# Patient Record
Sex: MALE | Race: WHITE | NOT HISPANIC OR LATINO | Employment: OTHER | URBAN - METROPOLITAN AREA
[De-identification: names, ages, dates, MRNs, and addresses within clinical notes are randomized per-mention and may not be internally consistent; named-entity substitution may affect disease eponyms.]

---

## 2024-09-09 ENCOUNTER — APPOINTMENT (OUTPATIENT)
Dept: CARDIOLOGY | Facility: HOSPITAL | Age: 69
End: 2024-09-09
Payer: MEDICARE

## 2024-09-09 ENCOUNTER — APPOINTMENT (OUTPATIENT)
Dept: RADIOLOGY | Facility: HOSPITAL | Age: 69
End: 2024-09-09
Payer: MEDICARE

## 2024-09-09 ENCOUNTER — HOSPITAL ENCOUNTER (INPATIENT)
Facility: HOSPITAL | Age: 69
LOS: 2 days | Discharge: NURSING, REHAB OR LONG-TERM CARE | End: 2024-09-11
Attending: EMERGENCY MEDICINE | Admitting: SURGERY
Payer: MEDICARE

## 2024-09-09 DIAGNOSIS — R41.0 DISORIENTATION: ICD-10-CM

## 2024-09-09 DIAGNOSIS — N17.9 ACUTE RENAL FAILURE, UNSPECIFIED ACUTE RENAL FAILURE TYPE (CMS-HCC): Primary | ICD-10-CM

## 2024-09-09 LAB
ALBUMIN SERPL BCP-MCNC: 3.9 G/DL (ref 3.4–5)
ALP SERPL-CCNC: 89 U/L (ref 33–136)
ALT SERPL W P-5'-P-CCNC: 24 U/L (ref 10–52)
ANION GAP SERPL CALC-SCNC: 22 MMOL/L (ref 10–20)
APPEARANCE UR: ABNORMAL
AST SERPL W P-5'-P-CCNC: 28 U/L (ref 9–39)
BACTERIA #/AREA URNS AUTO: ABNORMAL /HPF
BASOPHILS # BLD AUTO: 0.03 X10*3/UL (ref 0–0.1)
BASOPHILS NFR BLD AUTO: 0.2 %
BILIRUB DIRECT SERPL-MCNC: 0.1 MG/DL (ref 0–0.3)
BILIRUB SERPL-MCNC: 0.6 MG/DL (ref 0–1.2)
BILIRUB UR STRIP.AUTO-MCNC: NEGATIVE MG/DL
BUN SERPL-MCNC: 127 MG/DL (ref 6–23)
CALCIUM SERPL-MCNC: 9.5 MG/DL (ref 8.6–10.3)
CHLORIDE SERPL-SCNC: 95 MMOL/L (ref 98–107)
CO2 SERPL-SCNC: 20 MMOL/L (ref 21–32)
COLOR UR: ABNORMAL
CREAT SERPL-MCNC: 6.11 MG/DL (ref 0.5–1.3)
EGFRCR SERPLBLD CKD-EPI 2021: 9 ML/MIN/1.73M*2
EOSINOPHIL # BLD AUTO: 0.03 X10*3/UL (ref 0–0.7)
EOSINOPHIL NFR BLD AUTO: 0.2 %
ERYTHROCYTE [DISTWIDTH] IN BLOOD BY AUTOMATED COUNT: 13 % (ref 11.5–14.5)
ETHANOL SERPL-MCNC: <10 MG/DL
GLUCOSE SERPL-MCNC: 132 MG/DL (ref 74–99)
GLUCOSE UR STRIP.AUTO-MCNC: NORMAL MG/DL
HCT VFR BLD AUTO: 35.2 % (ref 41–52)
HGB BLD-MCNC: 11.6 G/DL (ref 13.5–17.5)
HYALINE CASTS #/AREA URNS AUTO: ABNORMAL /LPF
IMM GRANULOCYTES # BLD AUTO: 0.06 X10*3/UL (ref 0–0.7)
IMM GRANULOCYTES NFR BLD AUTO: 0.5 % (ref 0–0.9)
INR PPP: 1.3 (ref 0.9–1.1)
KETONES UR STRIP.AUTO-MCNC: NEGATIVE MG/DL
LACTATE SERPL-SCNC: 0.8 MMOL/L (ref 0.4–2)
LEUKOCYTE ESTERASE UR QL STRIP.AUTO: ABNORMAL
LYMPHOCYTES # BLD AUTO: 1.3 X10*3/UL (ref 1.2–4.8)
LYMPHOCYTES NFR BLD AUTO: 10.7 %
MCH RBC QN AUTO: 32.6 PG (ref 26–34)
MCHC RBC AUTO-ENTMCNC: 33 G/DL (ref 32–36)
MCV RBC AUTO: 99 FL (ref 80–100)
MONOCYTES # BLD AUTO: 0.9 X10*3/UL (ref 0.1–1)
MONOCYTES NFR BLD AUTO: 7.4 %
NEUTROPHILS # BLD AUTO: 9.78 X10*3/UL (ref 1.2–7.7)
NEUTROPHILS NFR BLD AUTO: 81 %
NITRITE UR QL STRIP.AUTO: NEGATIVE
NRBC BLD-RTO: 0 /100 WBCS (ref 0–0)
PH UR STRIP.AUTO: 5.5 [PH]
PLATELET # BLD AUTO: 360 X10*3/UL (ref 150–450)
POTASSIUM SERPL-SCNC: 4.9 MMOL/L (ref 3.5–5.3)
PROT SERPL-MCNC: 8.5 G/DL (ref 6.4–8.2)
PROT UR STRIP.AUTO-MCNC: ABNORMAL MG/DL
PROTHROMBIN TIME: 14.5 SECONDS (ref 9.8–12.8)
RBC # BLD AUTO: 3.56 X10*6/UL (ref 4.5–5.9)
RBC # UR STRIP.AUTO: ABNORMAL /UL
RBC #/AREA URNS AUTO: ABNORMAL /HPF
SODIUM SERPL-SCNC: 132 MMOL/L (ref 136–145)
SP GR UR STRIP.AUTO: 1.02
SQUAMOUS #/AREA URNS AUTO: ABNORMAL /HPF
UROBILINOGEN UR STRIP.AUTO-MCNC: NORMAL MG/DL
WBC # BLD AUTO: 12.1 X10*3/UL (ref 4.4–11.3)
WBC #/AREA URNS AUTO: >50 /HPF

## 2024-09-09 PROCEDURE — 71045 X-RAY EXAM CHEST 1 VIEW: CPT

## 2024-09-09 PROCEDURE — 71045 X-RAY EXAM CHEST 1 VIEW: CPT | Mod: FOREIGN READ | Performed by: RADIOLOGY

## 2024-09-09 PROCEDURE — 85025 COMPLETE CBC W/AUTO DIFF WBC: CPT | Performed by: EMERGENCY MEDICINE

## 2024-09-09 PROCEDURE — 87075 CULTR BACTERIA EXCEPT BLOOD: CPT | Mod: ELYLAB | Performed by: EMERGENCY MEDICINE

## 2024-09-09 PROCEDURE — 96361 HYDRATE IV INFUSION ADD-ON: CPT

## 2024-09-09 PROCEDURE — 2500000004 HC RX 250 GENERAL PHARMACY W/ HCPCS (ALT 636 FOR OP/ED): Performed by: NURSE PRACTITIONER

## 2024-09-09 PROCEDURE — 93005 ELECTROCARDIOGRAM TRACING: CPT

## 2024-09-09 PROCEDURE — 36415 COLL VENOUS BLD VENIPUNCTURE: CPT | Performed by: EMERGENCY MEDICINE

## 2024-09-09 PROCEDURE — 2020000001 HC ICU ROOM DAILY

## 2024-09-09 PROCEDURE — 76770 US EXAM ABDO BACK WALL COMP: CPT | Mod: FOREIGN READ | Performed by: RADIOLOGY

## 2024-09-09 PROCEDURE — 83605 ASSAY OF LACTIC ACID: CPT | Performed by: EMERGENCY MEDICINE

## 2024-09-09 PROCEDURE — 99291 CRITICAL CARE FIRST HOUR: CPT | Mod: 25 | Performed by: EMERGENCY MEDICINE

## 2024-09-09 PROCEDURE — 96374 THER/PROPH/DIAG INJ IV PUSH: CPT

## 2024-09-09 PROCEDURE — 82374 ASSAY BLOOD CARBON DIOXIDE: CPT | Performed by: EMERGENCY MEDICINE

## 2024-09-09 PROCEDURE — 85610 PROTHROMBIN TIME: CPT | Performed by: EMERGENCY MEDICINE

## 2024-09-09 PROCEDURE — 2500000004 HC RX 250 GENERAL PHARMACY W/ HCPCS (ALT 636 FOR OP/ED)

## 2024-09-09 PROCEDURE — 99291 CRITICAL CARE FIRST HOUR: CPT | Performed by: NURSE PRACTITIONER

## 2024-09-09 PROCEDURE — 76770 US EXAM ABDO BACK WALL COMP: CPT

## 2024-09-09 PROCEDURE — 82077 ASSAY SPEC XCP UR&BREATH IA: CPT | Performed by: EMERGENCY MEDICINE

## 2024-09-09 PROCEDURE — 82248 BILIRUBIN DIRECT: CPT | Performed by: EMERGENCY MEDICINE

## 2024-09-09 PROCEDURE — 2500000004 HC RX 250 GENERAL PHARMACY W/ HCPCS (ALT 636 FOR OP/ED): Performed by: EMERGENCY MEDICINE

## 2024-09-09 PROCEDURE — 3E033XZ INTRODUCTION OF VASOPRESSOR INTO PERIPHERAL VEIN, PERCUTANEOUS APPROACH: ICD-10-PCS | Performed by: SURGERY

## 2024-09-09 PROCEDURE — 87086 URINE CULTURE/COLONY COUNT: CPT | Mod: ELYLAB | Performed by: EMERGENCY MEDICINE

## 2024-09-09 PROCEDURE — 81001 URINALYSIS AUTO W/SCOPE: CPT | Performed by: EMERGENCY MEDICINE

## 2024-09-09 RX ORDER — SODIUM CHLORIDE, SODIUM LACTATE, POTASSIUM CHLORIDE, CALCIUM CHLORIDE 600; 310; 30; 20 MG/100ML; MG/100ML; MG/100ML; MG/100ML
75 INJECTION, SOLUTION INTRAVENOUS CONTINUOUS
Status: DISCONTINUED | OUTPATIENT
Start: 2024-09-09 | End: 2024-09-10

## 2024-09-09 RX ORDER — OLANZAPINE 10 MG/2ML
5 INJECTION, POWDER, FOR SOLUTION INTRAMUSCULAR ONCE AS NEEDED
Status: COMPLETED | OUTPATIENT
Start: 2024-09-09 | End: 2024-09-09

## 2024-09-09 RX ORDER — DEXMEDETOMIDINE HYDROCHLORIDE 4 UG/ML
INJECTION, SOLUTION INTRAVENOUS
Status: COMPLETED
Start: 2024-09-09 | End: 2024-09-09

## 2024-09-09 RX ORDER — SODIUM CHLORIDE 9 MG/ML
125 INJECTION, SOLUTION INTRAVENOUS CONTINUOUS
Status: DISCONTINUED | OUTPATIENT
Start: 2024-09-09 | End: 2024-09-09

## 2024-09-09 RX ORDER — NOREPINEPHRINE BITARTRATE/D5W 8 MG/250ML
.01-.2 PLASTIC BAG, INJECTION (ML) INTRAVENOUS CONTINUOUS
Status: DISCONTINUED | OUTPATIENT
Start: 2024-09-09 | End: 2024-09-10

## 2024-09-09 RX ORDER — CEFTRIAXONE 1 G/50ML
1 INJECTION, SOLUTION INTRAVENOUS EVERY 24 HOURS
Status: DISCONTINUED | OUTPATIENT
Start: 2024-09-09 | End: 2024-09-11 | Stop reason: HOSPADM

## 2024-09-09 RX ORDER — HEPARIN SODIUM 5000 [USP'U]/ML
5000 INJECTION, SOLUTION INTRAVENOUS; SUBCUTANEOUS EVERY 8 HOURS SCHEDULED
Status: DISCONTINUED | OUTPATIENT
Start: 2024-09-09 | End: 2024-09-11 | Stop reason: HOSPADM

## 2024-09-09 RX ORDER — DEXMEDETOMIDINE HYDROCHLORIDE 4 UG/ML
0-1.5 INJECTION, SOLUTION INTRAVENOUS CONTINUOUS
Status: DISCONTINUED | OUTPATIENT
Start: 2024-09-09 | End: 2024-09-10

## 2024-09-09 SDOH — SOCIAL STABILITY: SOCIAL INSECURITY: HAS ANYONE EVER THREATENED TO HURT YOUR FAMILY OR YOUR PETS?: NO

## 2024-09-09 SDOH — SOCIAL STABILITY: SOCIAL INSECURITY: HAVE YOU HAD ANY THOUGHTS OF HARMING ANYONE ELSE?: NO

## 2024-09-09 SDOH — SOCIAL STABILITY: SOCIAL INSECURITY: HAVE YOU HAD THOUGHTS OF HARMING ANYONE ELSE?: NO

## 2024-09-09 SDOH — SOCIAL STABILITY: SOCIAL INSECURITY: DO YOU FEEL UNSAFE GOING BACK TO THE PLACE WHERE YOU ARE LIVING?: NO

## 2024-09-09 SDOH — SOCIAL STABILITY: SOCIAL INSECURITY: DO YOU FEEL ANYONE HAS EXPLOITED OR TAKEN ADVANTAGE OF YOU FINANCIALLY OR OF YOUR PERSONAL PROPERTY?: NO

## 2024-09-09 SDOH — SOCIAL STABILITY: SOCIAL INSECURITY: ARE THERE ANY APPARENT SIGNS OF INJURIES/BEHAVIORS THAT COULD BE RELATED TO ABUSE/NEGLECT?: NO

## 2024-09-09 SDOH — SOCIAL STABILITY: SOCIAL INSECURITY: ARE YOU OR HAVE YOU BEEN THREATENED OR ABUSED PHYSICALLY, EMOTIONALLY, OR SEXUALLY BY ANYONE?: NO

## 2024-09-09 SDOH — SOCIAL STABILITY: SOCIAL INSECURITY: ABUSE: ADULT

## 2024-09-09 SDOH — SOCIAL STABILITY: SOCIAL INSECURITY: DOES ANYONE TRY TO KEEP YOU FROM HAVING/CONTACTING OTHER FRIENDS OR DOING THINGS OUTSIDE YOUR HOME?: NO

## 2024-09-09 SDOH — SOCIAL STABILITY: SOCIAL INSECURITY: WERE YOU ABLE TO COMPLETE ALL THE BEHAVIORAL HEALTH SCREENINGS?: YES

## 2024-09-09 ASSESSMENT — COGNITIVE AND FUNCTIONAL STATUS - GENERAL
PERSONAL GROOMING: A LITTLE
DRESSING REGULAR UPPER BODY CLOTHING: A LITTLE
STANDING UP FROM CHAIR USING ARMS: A LITTLE
CLIMB 3 TO 5 STEPS WITH RAILING: A LOT
DAILY ACTIVITIY SCORE: 18
HELP NEEDED FOR BATHING: A LOT
WALKING IN HOSPITAL ROOM: A LOT
TOILETING: A LITTLE
PATIENT BASELINE BEDBOUND: NO
MOBILITY SCORE: 19
DRESSING REGULAR LOWER BODY CLOTHING: A LITTLE

## 2024-09-09 ASSESSMENT — PAIN SCALES - GENERAL
PAINLEVEL_OUTOF10: 0 - NO PAIN
PAINLEVEL_OUTOF10: 0 - NO PAIN

## 2024-09-09 ASSESSMENT — ACTIVITIES OF DAILY LIVING (ADL)
PATIENT'S MEMORY ADEQUATE TO SAFELY COMPLETE DAILY ACTIVITIES?: NO
HEARING - RIGHT EAR: FUNCTIONAL
GROOMING: NEEDS ASSISTANCE
DRESSING YOURSELF: NEEDS ASSISTANCE
BATHING: INDEPENDENT
JUDGMENT_ADEQUATE_SAFELY_COMPLETE_DAILY_ACTIVITIES: NO
ADEQUATE_TO_COMPLETE_ADL: YES
WALKS IN HOME: NEEDS ASSISTANCE
LACK_OF_TRANSPORTATION: PATIENT UNABLE TO ANSWER
HEARING - LEFT EAR: FUNCTIONAL
TOILETING: NEEDS ASSISTANCE
FEEDING YOURSELF: INDEPENDENT

## 2024-09-09 ASSESSMENT — LIFESTYLE VARIABLES
PAROXYSMAL SWEATS: NO SWEAT VISIBLE
VISUAL DISTURBANCES: NOT PRESENT
BLOOD PRESSURE: 97/56
AUDIT-C TOTAL SCORE: -1
AGITATION: NORMAL ACTIVITY
HOW OFTEN DO YOU HAVE A DRINK CONTAINING ALCOHOL: NEVER
AUDIT-C TOTAL SCORE: -1
NAUSEA AND VOMITING: NO NAUSEA AND NO VOMITING
AUDITORY DISTURBANCES: NOT PRESENT
TREMOR: 5
TOTAL SCORE: 8
SKIP TO QUESTIONS 9-10: 0
HOW OFTEN DO YOU HAVE 6 OR MORE DRINKS ON ONE OCCASION: NEVER
ORIENTATION AND CLOUDING OF SENSORIUM: DISORIENTED FOR DATE BY MORE THAN 2 CALENDAR DAYS
HOW MANY STANDARD DRINKS CONTAINING ALCOHOL DO YOU HAVE ON A TYPICAL DAY: PATIENT DECLINED
HEADACHE, FULLNESS IN HEAD: NOT PRESENT
ANXIETY: NO ANXIETY, AT EASE
PULSE: 92

## 2024-09-09 ASSESSMENT — COLUMBIA-SUICIDE SEVERITY RATING SCALE - C-SSRS
1. IN THE PAST MONTH, HAVE YOU WISHED YOU WERE DEAD OR WISHED YOU COULD GO TO SLEEP AND NOT WAKE UP?: NO
6. HAVE YOU EVER DONE ANYTHING, STARTED TO DO ANYTHING, OR PREPARED TO DO ANYTHING TO END YOUR LIFE?: NO
2. HAVE YOU ACTUALLY HAD ANY THOUGHTS OF KILLING YOURSELF?: NO

## 2024-09-09 ASSESSMENT — PATIENT HEALTH QUESTIONNAIRE - PHQ9
2. FEELING DOWN, DEPRESSED OR HOPELESS: NOT AT ALL
1. LITTLE INTEREST OR PLEASURE IN DOING THINGS: NOT AT ALL
SUM OF ALL RESPONSES TO PHQ9 QUESTIONS 1 & 2: 0

## 2024-09-09 ASSESSMENT — PAIN - FUNCTIONAL ASSESSMENT
PAIN_FUNCTIONAL_ASSESSMENT: 0-10
PAIN_FUNCTIONAL_ASSESSMENT: 0-10

## 2024-09-09 NOTE — ED PROVIDER NOTES
HPI   Chief Complaint   Patient presents with    Altered Mental Status     Normally A&Ox3. Currently A&Ox2. Oriented to person and situation.    Abnormal Labs     , creat 6.43, K 5.7. Sent for concern of kidney failure.       Patient is a 69-year-old male with history of alcohol abuse, hypertension hyperlipidemia comes from the nursing home because abnormal labs as per EMS were the historian patient is always confused but as per EMS they told him that he is more confused than usual.  Patient denies any pain awake              Patient History   No past medical history on file.  No past surgical history on file.  No family history on file.  Social History     Tobacco Use    Smoking status: Not on file    Smokeless tobacco: Not on file   Substance Use Topics    Alcohol use: Not on file    Drug use: Not on file       Physical Exam   ED Triage Vitals   Temperature Heart Rate Respirations BP   09/09/24 1613 09/09/24 1613 09/09/24 1613 09/09/24 1613   36.4 °C (97.5 °F) 100 18 (!) 70/48      Pulse Ox Temp src Heart Rate Source Patient Position   09/09/24 1618 -- -- --   95 %         BP Location FiO2 (%)     -- --             Physical Exam  Vitals and nursing note reviewed.   Cardiovascular:      Rate and Rhythm: Normal rate and regular rhythm.   Pulmonary:      Effort: Pulmonary effort is normal.      Breath sounds: Normal breath sounds.   Abdominal:      Palpations: Abdomen is soft.   Skin:     General: Skin is warm.   Neurological:      General: No focal deficit present.      Mental Status: He is alert. Mental status is at baseline.           ED Course & MDM   Diagnoses as of 09/09/24 1904   Acute renal failure, unspecified acute renal failure type (CMS-HCC)   Disorientation                 No data recorded     [place] Coma Scale Score: 14 (09/09/24 1803 : Montse [place], [place])                           Medical Decision Making  EKG interpreted by me shows normal sinus rhythm rate of 91 normal QRS and normal ST  segment  My differential diagnosis  Acute electrolyte imbalance acute UTI acute dehydration acute sepsis  I reviewed the labs that were sent to the patient from the nursing home and also records from the [facility].  I ordered a CBC chemistry lactate urinalysis and EKG.  I also ordered IV fluids for the patient and the patient blood pressures were on the lower side.  Patient's elevated white blood cell count but serum lactate was normal after getting 3 L of normal saline patient's blood pressure responded as high as 97 systolic and then soon after went down to 81.  At this time decision was made to start the patient on continuous IV fluids and pressors discussed with the ICU team and at this time patient admitted to the ICU for further monitoring.  I also reached out to them as there was a sepsis screening and they recommended no antibiotics at this time and they would follow-up.  Labs Reviewed  CBC WITH AUTO DIFFERENTIAL - Abnormal     WBC                           12.1 (*)               nRBC                          0.0                    RBC                           3.56 (*)               Hemoglobin                    11.6 (*)               Hematocrit                    35.2 (*)               MCV                           99                     MCH                           32.6                   MCHC                          33.0                   RDW                           13.0                   Platelets                     360                    Neutrophils %                 81.0                   Immature Granulocytes %, Automated   0.5                    Lymphocytes %                 10.7                   Monocytes %                   7.4                    Eosinophils %                 0.2                    Basophils %                   0.2                    Neutrophils Absolute          9.78 (*)               Immature Granulocytes Absolute, Au*   0.06                   Lymphocytes Absolute           1.30                   Monocytes Absolute            0.90                   Eosinophils Absolute          0.03                   Basophils Absolute            0.03                BASIC METABOLIC PANEL - Abnormal     Glucose                       132 (*)                Sodium                        132 (*)                Potassium                     4.9                    Chloride                      95 (*)                 Bicarbonate                   20 (*)                 Anion Gap                     22 (*)                 Urea Nitrogen                 127 (*)                Creatinine                    6.11 (*)               eGFR                          9 (*)                  Calcium                       9.5                 HEPATIC FUNCTION PANEL - Abnormal     Albumin                       3.9                    Bilirubin, Total              0.6                    Bilirubin, Direct             0.1                    Alkaline Phosphatase          89                     ALT                           24                     AST                           28                     Total Protein                 8.5 (*)             PROTIME-INR - Abnormal     Protime                       14.5 (*)               INR                           1.3 (*)             LACTATE - Normal     Lactate                       0.8                      Narrative: Venipuncture immediately after or during the administration of Metamizole may lead to falsely low results. Testing should be performed immediately                prior to Metamizole dosing.  ALCOHOL - Normal     Alcohol                       <10                 BLOOD CULTURE  BLOOD CULTURE  URINALYSIS WITH REFLEX CULTURE AND MICROSCOPIC       Narrative: The following orders were created for panel order Urinalysis with Reflex Culture and Microscopic.                Procedure                               Abnormality         Status                                    ---------                               -----------         ------                                   Urinalysis with Reflex C...[[id number]]                                                               Extra Urine Gray Tube[[id number]]                                                                                     Please view results for these tests on the individual orders.  URINALYSIS WITH REFLEX CULTURE AND MICROSCOPIC  EXTRA URINE GRAY TUBE     XR chest 1 view   Final Result    No acute process.    Signed by Neftaly Lewis MD              Procedure  Critical Care    Performed by: Gwyn Macias MD  Authorized by: Gwyn Macias MD    Critical care provider statement:     Critical care time (minutes):  35    Critical care time was exclusive of:  Separately billable procedures and treating other patients    Critical care was necessary to treat or prevent imminent or life-threatening deterioration of the following conditions:  Circulatory failure    Critical care was time spent personally by me on the following activities:  Development of treatment plan with patient or surrogate, discussions with consultants, evaluation of patient's response to treatment, examination of patient, obtaining history from patient or surrogate, ordering and performing treatments and interventions, ordering and review of laboratory studies, ordering and review of radiographic studies, re-evaluation of patient's condition and review of old charts    Care discussed with: admitting provider         Gwyn Macias MD  09/09/24 8677

## 2024-09-10 LAB
ANION GAP SERPL CALC-SCNC: 13 MMOL/L (ref 10–20)
ANION GAP SERPL CALC-SCNC: 14 MMOL/L (ref 10–20)
ANION GAP SERPL CALC-SCNC: 17 MMOL/L (ref 10–20)
BUN SERPL-MCNC: 105 MG/DL (ref 6–23)
BUN SERPL-MCNC: 80 MG/DL (ref 6–23)
BUN SERPL-MCNC: 93 MG/DL (ref 6–23)
CALCIUM SERPL-MCNC: 8.4 MG/DL (ref 8.6–10.3)
CALCIUM SERPL-MCNC: 8.6 MG/DL (ref 8.6–10.3)
CALCIUM SERPL-MCNC: 8.8 MG/DL (ref 8.6–10.3)
CHLORIDE SERPL-SCNC: 108 MMOL/L (ref 98–107)
CHLORIDE SERPL-SCNC: 108 MMOL/L (ref 98–107)
CHLORIDE SERPL-SCNC: 112 MMOL/L (ref 98–107)
CO2 SERPL-SCNC: 15 MMOL/L (ref 21–32)
CO2 SERPL-SCNC: 21 MMOL/L (ref 21–32)
CO2 SERPL-SCNC: 22 MMOL/L (ref 21–32)
CREAT SERPL-MCNC: 1.64 MG/DL (ref 0.5–1.3)
CREAT SERPL-MCNC: 2.73 MG/DL (ref 0.5–1.3)
CREAT SERPL-MCNC: 3.38 MG/DL (ref 0.5–1.3)
EGFRCR SERPLBLD CKD-EPI 2021: 19 ML/MIN/1.73M*2
EGFRCR SERPLBLD CKD-EPI 2021: 24 ML/MIN/1.73M*2
EGFRCR SERPLBLD CKD-EPI 2021: 45 ML/MIN/1.73M*2
ERYTHROCYTE [DISTWIDTH] IN BLOOD BY AUTOMATED COUNT: 13.1 % (ref 11.5–14.5)
FERRITIN SERPL-MCNC: 872 NG/ML (ref 20–300)
GLUCOSE BLD MANUAL STRIP-MCNC: 107 MG/DL (ref 74–99)
GLUCOSE SERPL-MCNC: 105 MG/DL (ref 74–99)
GLUCOSE SERPL-MCNC: 112 MG/DL (ref 74–99)
GLUCOSE SERPL-MCNC: 112 MG/DL (ref 74–99)
HCT VFR BLD AUTO: 29.5 % (ref 41–52)
HGB BLD-MCNC: 9.4 G/DL (ref 13.5–17.5)
HOLD SPECIMEN: NORMAL
IRON SATN MFR SERPL: 49 % (ref 25–45)
IRON SERPL-MCNC: 84 UG/DL (ref 35–150)
MAGNESIUM SERPL-MCNC: 1.84 MG/DL (ref 1.6–2.4)
MCH RBC QN AUTO: 31.9 PG (ref 26–34)
MCHC RBC AUTO-ENTMCNC: 31.9 G/DL (ref 32–36)
MCV RBC AUTO: 100 FL (ref 80–100)
NRBC BLD-RTO: 0 /100 WBCS (ref 0–0)
PHOSPHATE SERPL-MCNC: 4.3 MG/DL (ref 2.5–4.9)
PLATELET # BLD AUTO: 244 X10*3/UL (ref 150–450)
POTASSIUM SERPL-SCNC: 4.9 MMOL/L (ref 3.5–5.3)
POTASSIUM SERPL-SCNC: 5 MMOL/L (ref 3.5–5.3)
POTASSIUM SERPL-SCNC: 5.2 MMOL/L (ref 3.5–5.3)
RBC # BLD AUTO: 2.95 X10*6/UL (ref 4.5–5.9)
SODIUM SERPL-SCNC: 135 MMOL/L (ref 136–145)
SODIUM SERPL-SCNC: 139 MMOL/L (ref 136–145)
SODIUM SERPL-SCNC: 141 MMOL/L (ref 136–145)
TIBC SERPL-MCNC: 172 UG/DL (ref 240–445)
TSH SERPL-ACNC: 0.93 MIU/L (ref 0.44–3.98)
UIBC SERPL-MCNC: 88 UG/DL (ref 110–370)
WBC # BLD AUTO: 5.4 X10*3/UL (ref 4.4–11.3)

## 2024-09-10 PROCEDURE — 82374 ASSAY BLOOD CARBON DIOXIDE: CPT | Performed by: NURSE PRACTITIONER

## 2024-09-10 PROCEDURE — 82947 ASSAY GLUCOSE BLOOD QUANT: CPT

## 2024-09-10 PROCEDURE — 85027 COMPLETE CBC AUTOMATED: CPT | Performed by: NURSE PRACTITIONER

## 2024-09-10 PROCEDURE — 83540 ASSAY OF IRON: CPT | Performed by: INTERNAL MEDICINE

## 2024-09-10 PROCEDURE — 2500000004 HC RX 250 GENERAL PHARMACY W/ HCPCS (ALT 636 FOR OP/ED): Performed by: NURSE PRACTITIONER

## 2024-09-10 PROCEDURE — 2500000004 HC RX 250 GENERAL PHARMACY W/ HCPCS (ALT 636 FOR OP/ED): Performed by: SURGERY

## 2024-09-10 PROCEDURE — 82728 ASSAY OF FERRITIN: CPT | Performed by: INTERNAL MEDICINE

## 2024-09-10 PROCEDURE — 84443 ASSAY THYROID STIM HORMONE: CPT | Performed by: INTERNAL MEDICINE

## 2024-09-10 PROCEDURE — 84100 ASSAY OF PHOSPHORUS: CPT | Performed by: NURSE PRACTITIONER

## 2024-09-10 PROCEDURE — 99291 CRITICAL CARE FIRST HOUR: CPT | Performed by: SURGERY

## 2024-09-10 PROCEDURE — 80048 BASIC METABOLIC PNL TOTAL CA: CPT

## 2024-09-10 PROCEDURE — 80048 BASIC METABOLIC PNL TOTAL CA: CPT | Performed by: NURSE PRACTITIONER

## 2024-09-10 PROCEDURE — 36415 COLL VENOUS BLD VENIPUNCTURE: CPT

## 2024-09-10 PROCEDURE — 36415 COLL VENOUS BLD VENIPUNCTURE: CPT | Performed by: NURSE PRACTITIONER

## 2024-09-10 PROCEDURE — 83735 ASSAY OF MAGNESIUM: CPT | Performed by: NURSE PRACTITIONER

## 2024-09-10 PROCEDURE — 2020000001 HC ICU ROOM DAILY

## 2024-09-10 RX ORDER — FOLIC ACID 1 MG/1
1 TABLET ORAL DAILY
Status: DISCONTINUED | OUTPATIENT
Start: 2024-09-10 | End: 2024-09-11 | Stop reason: HOSPADM

## 2024-09-10 RX ORDER — PHENOBARBITAL 32.4 MG/1
32.4 TABLET ORAL 3 TIMES DAILY
Status: DISCONTINUED | OUTPATIENT
Start: 2024-09-12 | End: 2024-09-10

## 2024-09-10 RX ORDER — PHENOBARBITAL SODIUM 65 MG/ML
32.5 INJECTION, SOLUTION INTRAMUSCULAR; INTRAVENOUS EVERY 8 HOURS
Status: DISCONTINUED | OUTPATIENT
Start: 2024-09-12 | End: 2024-09-11 | Stop reason: HOSPADM

## 2024-09-10 RX ORDER — MULTIVIT-MIN/IRON FUM/FOLIC AC 7.5 MG-4
1 TABLET ORAL DAILY
Status: DISCONTINUED | OUTPATIENT
Start: 2024-09-10 | End: 2024-09-11 | Stop reason: HOSPADM

## 2024-09-10 RX ORDER — PHENOBARBITAL 32.4 MG/1
64.8 TABLET ORAL 3 TIMES DAILY
Status: DISCONTINUED | OUTPATIENT
Start: 2024-09-10 | End: 2024-09-10

## 2024-09-10 RX ORDER — PHENOBARBITAL SODIUM 65 MG/ML
65 INJECTION, SOLUTION INTRAMUSCULAR; INTRAVENOUS EVERY 8 HOURS
Status: DISCONTINUED | OUTPATIENT
Start: 2024-09-10 | End: 2024-09-11 | Stop reason: HOSPADM

## 2024-09-10 RX ORDER — LANOLIN ALCOHOL/MO/W.PET/CERES
100 CREAM (GRAM) TOPICAL DAILY
Status: DISCONTINUED | OUTPATIENT
Start: 2024-09-11 | End: 2024-09-11 | Stop reason: HOSPADM

## 2024-09-10 RX ORDER — LANOLIN ALCOHOL/MO/W.PET/CERES
100 CREAM (GRAM) TOPICAL DAILY
Status: DISCONTINUED | OUTPATIENT
Start: 2024-09-10 | End: 2024-09-10

## 2024-09-10 RX ORDER — SODIUM CHLORIDE, SODIUM LACTATE, POTASSIUM CHLORIDE, CALCIUM CHLORIDE 600; 310; 30; 20 MG/100ML; MG/100ML; MG/100ML; MG/100ML
75 INJECTION, SOLUTION INTRAVENOUS CONTINUOUS
Status: DISCONTINUED | OUTPATIENT
Start: 2024-09-10 | End: 2024-09-11 | Stop reason: HOSPADM

## 2024-09-10 RX ORDER — MAGNESIUM SULFATE HEPTAHYDRATE 40 MG/ML
4 INJECTION, SOLUTION INTRAVENOUS ONCE
Status: COMPLETED | OUTPATIENT
Start: 2024-09-10 | End: 2024-09-10

## 2024-09-10 ASSESSMENT — LIFESTYLE VARIABLES
TOTAL SCORE: 6
HEADACHE, FULLNESS IN HEAD: NOT PRESENT
ORIENTATION AND CLOUDING OF SENSORIUM: CANNOT DO SERIAL ADDITIONS OR IS UNCERTAIN ABOUT DATE
VISUAL DISTURBANCES: NOT PRESENT
TOTAL SCORE: 5
HEADACHE, FULLNESS IN HEAD: NOT PRESENT
TREMOR: NO TREMOR
VISUAL DISTURBANCES: NOT PRESENT
ORIENTATION AND CLOUDING OF SENSORIUM: DISORIENTED FOR DATE BY MORE THAN 2 CALENDAR DAYS
ANXIETY: MILDLY ANXIOUS
PAROXYSMAL SWEATS: NO SWEAT VISIBLE
AUDITORY DISTURBANCES: NOT PRESENT
TOTAL SCORE: 6
NAUSEA AND VOMITING: NO NAUSEA AND NO VOMITING
ANXIETY: MILDLY ANXIOUS
ORIENTATION AND CLOUDING OF SENSORIUM: DISORIENTED FOR DATE BY MORE THAN 2 CALENDAR DAYS
TREMOR: NO TREMOR
ANXIETY: MILDLY ANXIOUS
TOTAL SCORE: 5
TREMOR: 3
AUDITORY DISTURBANCES: NOT PRESENT
TREMOR: NOT VISIBLE, BUT CAN BE FELT FINGERTIP TO FINGERTIP
PAROXYSMAL SWEATS: NO SWEAT VISIBLE
VISUAL DISTURBANCES: NOT PRESENT
ORIENTATION AND CLOUDING OF SENSORIUM: DISORIENTED FOR DATE BY MORE THAN 2 CALENDAR DAYS
ANXIETY: MILDLY ANXIOUS
AGITATION: NORMAL ACTIVITY
AUDITORY DISTURBANCES: NOT PRESENT
PAROXYSMAL SWEATS: NO SWEAT VISIBLE
TREMOR: NOT VISIBLE, BUT CAN BE FELT FINGERTIP TO FINGERTIP
NAUSEA AND VOMITING: NO NAUSEA AND NO VOMITING
AGITATION: NORMAL ACTIVITY
PAROXYSMAL SWEATS: NO SWEAT VISIBLE
HEADACHE, FULLNESS IN HEAD: NOT PRESENT
VISUAL DISTURBANCES: NOT PRESENT
HEADACHE, FULLNESS IN HEAD: NOT PRESENT
AGITATION: 2
AUDITORY DISTURBANCES: NOT PRESENT
TOTAL SCORE: 4
PAROXYSMAL SWEATS: NO SWEAT VISIBLE
ORIENTATION AND CLOUDING OF SENSORIUM: DISORIENTED FOR PLACE OR PERSON
NAUSEA AND VOMITING: NO NAUSEA AND NO VOMITING
VISUAL DISTURBANCES: NOT PRESENT
AUDITORY DISTURBANCES: NOT PRESENT
AGITATION: NORMAL ACTIVITY
ANXIETY: MILDLY ANXIOUS
NAUSEA AND VOMITING: NO NAUSEA AND NO VOMITING
NAUSEA AND VOMITING: NO NAUSEA AND NO VOMITING
HEADACHE, FULLNESS IN HEAD: NOT PRESENT
AGITATION: NORMAL ACTIVITY

## 2024-09-10 ASSESSMENT — COGNITIVE AND FUNCTIONAL STATUS - GENERAL
DRESSING REGULAR UPPER BODY CLOTHING: A LITTLE
EATING MEALS: A LITTLE
HELP NEEDED FOR BATHING: A LOT
MOBILITY SCORE: 17
DAILY ACTIVITIY SCORE: 17
TOILETING: A LITTLE
PERSONAL GROOMING: A LITTLE
MOVING TO AND FROM BED TO CHAIR: A LITTLE
DRESSING REGULAR LOWER BODY CLOTHING: A LITTLE
STANDING UP FROM CHAIR USING ARMS: A LITTLE
WALKING IN HOSPITAL ROOM: A LOT
CLIMB 3 TO 5 STEPS WITH RAILING: TOTAL

## 2024-09-10 ASSESSMENT — PAIN - FUNCTIONAL ASSESSMENT
PAIN_FUNCTIONAL_ASSESSMENT: 0-10
PAIN_FUNCTIONAL_ASSESSMENT: 0-10
PAIN_FUNCTIONAL_ASSESSMENT: CPOT (CRITICAL CARE PAIN OBSERVATION TOOL)
PAIN_FUNCTIONAL_ASSESSMENT: 0-10
PAIN_FUNCTIONAL_ASSESSMENT: CPOT (CRITICAL CARE PAIN OBSERVATION TOOL)
PAIN_FUNCTIONAL_ASSESSMENT: 0-10

## 2024-09-10 ASSESSMENT — PAIN SCALES - GENERAL
PAINLEVEL_OUTOF10: 0 - NO PAIN

## 2024-09-10 NOTE — PROGRESS NOTES
[facility] Critical Care Medicine       Date:  9/10/2024  Patient:  Kyle Argueta  YOB: 1955  MRN:  [id number]   Admit Date:  9/9/2024  ========================================================================================================    Chief Complaint   Patient presents with    Altered Mental Status     Normally A&Ox3. Currently A&Ox2. Oriented to person and situation.    Abnormal Labs     , creat 6.43, K 5.7. Sent for concern of kidney failure.         History of Present Illness:  Kyle Argueta is a 69 y.o. year old male patient with Past Medical History of avascular necrosis of bilateral hips and is wheelchair bound, EtOH abuse and hypertension presents to [facility] emergency department chief complaint of altered mental status.  According to ER documentation patient is normally alert and oriented x 3.  Currently on admission, patient is awake and alert to self only.  Overall he is a poor historian but denies any pain, dysuria, hematuria or suprapubic discomfort.  Patient denies having any fevers or chills.  Patient resides in a nursing facility due to his ambulatory dysfunction.  He was sent in for evaluation for his change in mental status.     On arrival to the emergency department afebrile 36.4,  sinus tach, RR 18, BP 70/48, 95% on 2 L nasal cannula.  Twelve-lead EKG shows normal sinus rhythm with ventricular 91.  No acute ischemic changes.  Normal QTc.  Sodium 132 and potassium 4.9, chloride 95, bicarb 20, anion gap 22, , creatinine 6.11 with a GFR 9.  LFTs within normal limits.  Lactate 0.8.  INR 1.3.  WBC 12.4, neutrophils 9.78, hemoglobin 9.6, hematocrit 35.2, platelet count 360.  Urinalysis shows moderate leukocyte esterase with 2+ bacteria.  No nitrates.  Single view of the chest no acute cardiopulmonary process.  Due to patient's hypotension he was given 2 L of crystalloids with improvement in his blood pressure but did require low-dose norepinephrine and  "will be admitted to the ICU for further care.     Interval ICU Events:  9/9: Admitted to ICU for acute renal failure secondary to dehydration/UTI on norepinephrine.    9/10: significantly improved Creatinine overnight 6->3->2, good  ml, still confused    Medical History:  History reviewed. No pertinent past medical history.  History reviewed. No pertinent surgical history.  No medications prior to admission.     Patient has no known allergies.  Social History     Tobacco Use    Smoking status: Unknown     Passive exposure: Past     No family history on file.    Review of Systems:  14 point review of systems was completed and negative except for those specially mention in my HPI    Physical Exam:    Heart Rate:  []   Temp:  [36.4 °C (97.5 °F)-37 °C (98.6 °F)]   Resp:  [13-34]   BP: ()/(40-75)   Height:  [167.6 cm (5' 6\")]   Weight:  [74.8 kg (165 lb)-78.1 kg (172 lb 2.9 oz)]   SpO2:  [90 %-100 %]     Physical Exam  GeN; Confused and agitated  Heart: RRR  Lungs; CTAB  Abd; Soft, ND, NTTP  Objective:    I have reviewed all medications, laboratory results, and imaging pertinent for today's encounter    Component      Latest Ref Rng 9/9/2024 9/10/2024   WBC      4.4 - 11.3 x10*3/uL 12.1 (H)  5.4    nRBC      0.0 - 0.0 /100 WBCs 0.0  0.0    RBC      4.50 - 5.90 x10*6/uL 3.56 (L)  2.95 (L)    HEMOGLOBIN      13.5 - 17.5 g/dL 11.6 (L)  9.4 (L)    HEMATOCRIT      41.0 - 52.0 % 35.2 (L)  29.5 (L)    MCV      80 - 100 fL 99  100    MCH      26.0 - 34.0 pg 32.6  31.9    MCHC      32.0 - 36.0 g/dL 33.0  31.9 (L)    RED CELL DISTRIBUTION WIDTH      11.5 - 14.5 % 13.0  13.1    Platelets      150 - 450 x10*3/uL 360  244    Neutrophils %      40.0 - 80.0 % 81.0     Immature Granulocytes %, Automated      0.0 - 0.9 % 0.5     Lymphocytes %      13.0 - 44.0 % 10.7     Monocytes %      2.0 - 10.0 % 7.4     Eosinophils %      0.0 - 6.0 % 0.2     Basophils %      0.0 - 2.0 % 0.2     Neutrophils Absolute      1.20 - 7.70 " x10*3/uL 9.78 (H)     Immature Granulocytes Absolute, Automated      0.00 - 0.70 x10*3/uL 0.06     Lymphocytes Absolute      1.20 - 4.80 x10*3/uL 1.30     Monocytes Absolute      0.10 - 1.00 x10*3/uL 0.90     Eosinophils Absolute      0.00 - 0.70 x10*3/uL 0.03     Basophils Absolute      0.00 - 0.10 x10*3/uL 0.03     Color, Urine      Light-Yellow, Yellow, Dark-Yellow  Light-Yellow     Appearance, Urine      Clear  Turbid ! (N)     Specific Gravity, Urine      1.005 - 1.035  1.017     pH, Urine      5.0, 5.5, 6.0, 6.5, 7.0, 7.5, 8.0  5.5     Protein, Urine      NEGATIVE, 10 (TRACE), 20 (TRACE) mg/dL 30 (1+) !     Glucose, Urine      Normal mg/dL Normal     Blood, Urine      NEGATIVE  0.2 (2+) !     Ketones, Urine      NEGATIVE mg/dL NEGATIVE     Bilirubin, Urine      NEGATIVE  NEGATIVE     Urobilinogen, Urine      Normal mg/dL Normal     Nitrite, Urine      NEGATIVE  NEGATIVE     Leukocyte Esterase, Urine      NEGATIVE  500 Jose Enrique/µL !     GLUCOSE      74 - 99 mg/dL 132 (H)  105 (H)    GLUCOSE        112 (H)    SODIUM      136 - 145 mmol/L 132 (L)  139    SODIUM        135 (L)    POTASSIUM      3.5 - 5.3 mmol/L 4.9  5.0    POTASSIUM        5.2    CHLORIDE      98 - 107 mmol/L 95 (L)  108 (H)    CHLORIDE        108 (H)    Bicarbonate      21 - 32 mmol/L 20 (L)  22    Bicarbonate        15 (L)    Anion Gap      10 - 20 mmol/L 22 (H)  14    Anion Gap        17    Blood Urea Nitrogen      6 - 23 mg/dL 127 (HH)  93 (HH)    Blood Urea Nitrogen        105 (HH)    Creatinine      0.50 - 1.30 mg/dL 6.11 (H)  2.73 (H)    Creatinine        3.38 (H)    EGFR      >60 mL/min/1.73m*2 9 (L)  24 (L)    EGFR        19 (L)    Calcium      8.6 - 10.3 mg/dL 9.5  8.6    Calcium        8.4 (L)    Albumin      3.4 - 5.0 g/dL 3.9     Bilirubin Total      0.0 - 1.2 mg/dL 0.6     Bilirubin, Direct      0.0 - 0.3 mg/dL 0.1     Alkaline Phosphatase      33 - 136 U/L 89     ALT      10 - 52 U/L 24     AST      9 - 39 U/L 28     Total Protein       6.4 - 8.2 g/dL 8.5 (H)     WBC, Urine      1-5, NONE /HPF >50 !     RBC, Urine      NONE, 1-2, 3-5 /HPF 3-5     Squamous Epithelial Cells, Urine      Reference range not established. /HPF 1-9 (SPARSE)     Bacteria, Urine      NONE SEEN /HPF 2+ !     Hyaline Casts, Urine      NONE /LPF 1+ !     Protime      9.8 - 12.8 seconds 14.5 (H)     INR      0.9 - 1.1  1.3 (H)     Lactate      0.4 - 2.0 mmol/L 0.8     Alcohol      <=10 mg/dL <10     MAGNESIUM      1.60 - 2.40 mg/dL  1.84    PHOSPHORUS      2.5 - 4.9 mg/dL  4.3       Legend:  (H) High  (L) Low  ! (N) Normal  ! Abnormal  (HH) High Panic    Assessment/Plan:    I am currently managing this critically ill patient for the following problems:    Neuro/Psych/Pain Ctrl/Sedation:  Metabolic Encephalopathy  Hx ETOH abuse, possibly in EtOH withdrawal  -Will place on phenobarb taper  -Needing precedex for sedation     Respiratory/ENT:  -On 2 Liters Nasal Cannula   -Wean O2 as tolerated      Cardiovascular:  Hypotension - resolved  -Off levo  -Will give IVF bolus as needed for hypotension, likely due to hypovolemia/septic shock    GI:  -No acute issues  -Diet as tolerated, but too confused right now to get diet     Renal/Volume Status (Intra & Extravascular):  Acute Renal Failure - likely 2/2 sepsis/hypovolemia - 6->3->2  -Baseline creatinine normal  -IVF maintenance/Bun & Cr monitor  -Avoid nephrotoxic agents  -PVR q4 hours to rule out urinary retention   -Hold lisinopril/hydrochlorothiazide  -Check renal ultrasound  -Consult nephrology  -Will use IVF Bolus for hypotension    Endocrine  No issues to date     Infectious Disease:  UTI  -Follow urine/blood cultures  -1 g Ceftriaxone daily for 7 days, follow up culture results     Heme/Onc:  DVT prophylaxis  Heparin subcutaneous      OBGYN/MSK:  AVN bilateral hips   PT/OT when appropriate   Wheelchair-bound at baseline     Ethics/Code Status:  Full Code      :  DVT Prophylaxis: subcutaneous heparin   GI  Prophylaxis: NA  Bowel Regimen: NA   Diet: Regular Diet if tolerating  [facility]: NA  [place]: TAIWO  [facility]: NA   Restraints: Yes  Dispo: ICU    Critical Care Time:  46 minutes    Carlos Gomez MD  09/10/24  1:03 PM

## 2024-09-10 NOTE — CONSULTS
[facility] Nephrology  Consult Note           Reason for Consult: Acute kidney injury  Requesting Physician:  Dr. Carlos Gomez MD      Chief Complaint: Altered mental status  History Obtained From:  patient, electronic medical record    History of Present Ilness:    69 y.o. year old male who presented to the emergency department for further evaluation and management of change in mental status at the emergency department the patient was awake and alert to self only did not provide any history he was found by the nursing home staff where he resides that he has difficulty in ambulation and his mental sharpness and capacity they sent to the emergency department for evaluation where he was found to have a blood pressure of 70/48 with metabolic acidosis bicarb of 20 and a gap of 22 with BUN and creatinine 127 and 6.1 no known chronic kidney disease hemoglobin is 9.6 and adequate platelet at 360 patient did receive fluids and admitted to critical care bed this clinical presentation did take place in a patient with avascular necrosis of bilateral hips EtOH abuse and essential hypertension  Past Medical History:    History reviewed. No pertinent past medical history.     Past Surgical History:    History reviewed. No pertinent surgical history.     Home Medications:    No current facility-administered medications on file prior to encounter.     No current outpatient medications on file prior to encounter.       Allergies:  Patient has no known allergies.    Social History:    Social History     Socioeconomic History    Marital status: Single     Spouse name: Not on file    Number of children: Not on file    Years of education: Not on file    Highest education level: Not on file   Occupational History    Not on file   Tobacco Use    Smoking status: Unknown     Passive exposure: Past    Smokeless tobacco: Not on file   Vaping Use    Vaping status: Not on file   Substance and Sexual Activity    Alcohol use: Not on file     "Drug use: Not on file    Sexual activity: Not on file   Other Topics Concern    Not on file   Social History Narrative    Not on file     Social Determinants of Health     Financial Resource Strain: Patient Unable To Answer (9/9/2024)    Overall Financial Resource Strain (CARDIA)     Difficulty of Paying Living Expenses: Patient unable to answer   Food Insecurity: No Food Insecurity (8/9/2024)    Received from [facility]    Hunger Vital Sign     Worried About Running Out of Food in the Last Year: Never true     Ran Out of Food in the Last Year: Never true   Transportation Needs: Patient Unable To Answer (9/9/2024)    PRAPARE - Transportation     Lack of Transportation (Medical): Patient unable to answer     Lack of Transportation (Non-Medical): Patient unable to answer   Physical Activity: Not on file   Stress: Not on file   Social Connections: Not on file   Intimate Partner Violence: Not on file   Housing Stability: Patient Unable To Answer (9/9/2024)    Housing Stability Vital Sign     Unable to Pay for Housing in the Last Year: Patient unable to answer     Number of Times Moved in the Last Year: 1     Homeless in the Last Year: Patient unable to answer       Family History:   No family history on file.    Review of Systems:   Patient is alert follow commands sluggishly does not provide any system review      Physical exam:   Constitutional:  VITALS:  /53   Pulse 96   Temp 37 °C (98.6 °F)   Resp 26   Ht 1.676 m (5' 6\")   Wt 78 kg (171 lb 15.3 oz)   SpO2 99%   BMI 27.75 kg/m²      Wt Readings from Last 3 Encounters:   09/10/24 78 kg (171 lb 15.3 oz)      Gen: alert, awake, nad  Head: atraumatic, normocephalic  Skin: no rash, turgor wnl  Heent:  eomi, mmm  Neck: no bruits or jvd noted, thyroid normal  Lungs:  clear to auscultation  Heart:  regular rate and rhythm, no murmurs  Abdomen:  +bs, soft, nt, nd, no hepatosplenomegaly suprapubic dullness  Extremities: no edema  Psychiatric: mood and affect " appropriate; judgement and insight intact  Musculoskeletal:  Rom, muscular strength intact; digits, nails normal    Data/  CBC:   Lab Results   Component Value Date    WBC 5.4 09/10/2024    RBC 2.95 (L) 09/10/2024    HGB 9.4 (L) 09/10/2024    HCT 29.5 (L) 09/10/2024     09/10/2024    MCH 31.9 09/10/2024    MCHC 31.9 (L) 09/10/2024    RDW 13.1 09/10/2024     09/10/2024     BMP:    Lab Results   Component Value Date     09/10/2024    K 5.0 09/10/2024     (H) 09/10/2024    CO2 22 09/10/2024    BUN 93 (HH) 09/10/2024    CREATININE 2.73 (H) 09/10/2024    CALCIUM 8.6 09/10/2024    GLUCOSE 105 (H) 09/10/2024     US renal complete  Narrative: STUDY:  Renal Ultrasound; 9/9/2024 10:22 PM  INDICATION:    Acute renal failure.    COMPARISON:    None available.  ACCESSION NUMBER(S):  [id number]  ORDERING CLINICIAN:  JO GROVES  TECHNIQUE:  Ultrasound of the Kidneys.    FINDINGS:  RIGHT KIDNEY:  The right kidney measures 12.2 cm in length.  Renal cortical  echotexture is increased.  There is no hydronephrosis.  There are no  stones.  There are no cysts.  LEFT KIDNEY:  The left kidney measures 11.8 cm in length.  Renal cortical  echotexture is increased.  There is no hydronephrosis.  There are no  stones.  There are no cysts.     BLADDER:   The urinary bladder is partially distended.  Prostate volume is 10 ml.  Impression: Increased echogenicity of the renal cortices consistent with intrinsic  renal disease.  No hydronephrosis bilaterally.   Signed by Mike Hernandez MD  XR chest 1 view  Narrative: STUDY:  Chest Radiograph;  9/9/2024 5:35PM  INDICATION:  Patient reports weakness.  COMPARISON:  None available.  ACCESSION NUMBER(S):  [id number]  ORDERING CLINICIAN:  MIKA CALDERON  TECHNIQUE:  Frontal chest was obtained at 17:34 hours.  FINDINGS:  CARDIOMEDIASTINAL SILHOUETTE:  Cardiomediastinal silhouette is normal in size and configuration.     LUNGS:  Lungs are clear.     ABDOMEN:  No remarkable  upper abdominal findings.     BONES:  No acute osseous changes.  Impression: No acute process.  Signed by Neftaly Lewis MD  ECG 12 lead  Normal sinus rhythm  Left axis deviation  Abnormal ECG  No previous ECGs available    LFT:   Lab Results   Component Value Date    AST 28 09/09/2024    ALT 24 09/09/2024    ALKPHOS 89 09/09/2024    BILITOT 0.6 09/09/2024      Urinalysis:   Lab Results   Component Value Date    TEAGAN 5.5 09/09/2024    PROTUR 30 (1+) (A) 09/09/2024    GLUCOSEU Normal 09/09/2024    BLOODU 0.2 (2+) (A) 09/09/2024    KETONESU NEGATIVE 09/09/2024    BILIRUBINU NEGATIVE 09/09/2024    NITRITEU NEGATIVE 09/09/2024    LEUKOCYTESU 500 Jose Enrique/µL (A) 09/09/2024      Imaging: US renal complete  Narrative: STUDY:  Renal Ultrasound; 9/9/2024 10:22 PM  INDICATION:    Acute renal failure.    COMPARISON:    None available.  ACCESSION NUMBER(S):  [id number]  ORDERING CLINICIAN:  JO GROVES  TECHNIQUE:  Ultrasound of the Kidneys.    FINDINGS:  RIGHT KIDNEY:  The right kidney measures 12.2 cm in length.  Renal cortical  echotexture is increased.  There is no hydronephrosis.  There are no  stones.  There are no cysts.  LEFT KIDNEY:  The left kidney measures 11.8 cm in length.  Renal cortical  echotexture is increased.  There is no hydronephrosis.  There are no  stones.  There are no cysts.     BLADDER:   The urinary bladder is partially distended.  Prostate volume is 10 ml.  Impression: Increased echogenicity of the renal cortices consistent with intrinsic  renal disease.  No hydronephrosis bilaterally.   Signed by Mike Hernandez MD  XR chest 1 view  Narrative: STUDY:  Chest Radiograph;  9/9/2024 5:35PM  INDICATION:  Patient reports weakness.  COMPARISON:  None available.  ACCESSION NUMBER(S):  [id number]  ORDERING CLINICIAN:  MIKA CALDERON  TECHNIQUE:  Frontal chest was obtained at 17:34 hours.  FINDINGS:  CARDIOMEDIASTINAL SILHOUETTE:  Cardiomediastinal silhouette is normal in size and configuration.      LUNGS:  Lungs are clear.     ABDOMEN:  No remarkable upper abdominal findings.     BONES:  No acute osseous changes.  Impression: No acute process.  Signed by Neftaly Lewis MD  ECG 12 lead  Normal sinus rhythm  Left axis deviation  Abnormal ECG  No previous ECGs available           Assessment/    69 y.o. year old male who presented to the emergency department for further evaluation and management of change in mental status at the emergency department the patient was awake and alert to self only did not provide any history he was found by the nursing home staff where he resides that he has difficulty in ambulation and his mental sharpness and capacity they sent to the emergency department for evaluation where he was found to have a blood pressure of 70/48 with metabolic acidosis bicarb of 20 and a gap of 22 with BUN and creatinine 127 and 6.1 no known chronic kidney disease hemoglobin is 9.6 and adequate platelet at 360 patient did receive fluids and admitted to critical care bed this clinical presentation did take place in a patient with avascular necrosis of bilateral hips EtOH abuse and essential hypertension    Acute kidney injury with associated metabolic acidosis secondary to prerenal azotemia and or obstructive uropathy no NSAID use no ACE or ARB no recent contrast exposure    Plan/  Continue IV fluid  [facility] catheter  Daily clinical and laboratory assessment      Thank you for the consultation.  Please do not hesitate to call with questions.    Deandre Estevez MD

## 2024-09-10 NOTE — NURSING NOTE
Pt bladder scanner shows   45ml at 2130   301ml at 0130  378ml 0230  0500    Patient voided 950ml via external catheter through out my shift.

## 2024-09-10 NOTE — PROGRESS NOTES
09/10/24 1612   Discharge Planning   Home or Post Acute Services Post acute facilities (Rehab/SNF/etc)   Type of Post Acute Facility Services Skilled nursing;Rehab   Expected Discharge Disposition SNF   Does the patient need discharge transport arranged? Yes   RoundTrip coordination needed? Yes   Has discharge transport been arranged? No     Per Interdisciplinary rounding report with team, pt was sent from [facility] due to increased confusion, pt admitted with DX acute renal failure- new onset and UTI. Pt sleeping when tried to meet with him for assessment. [facility] states pt is from SNF unit,  and willing to accept patient back, but If not returning by 9/11 at 11:59pm he will need a new auth for SNF return. CT team will notify attending of this information, and continue to monitor case progression for DC planning.

## 2024-09-10 NOTE — H&P
[facility] Critical Care Medicine       Date:  9/9/2024  Patient:  Kyle Argueta  YOB: 1955  MRN:  [id number]   Admit Date:  9/9/2024  ========================================================================================================    Chief Complaint   Patient presents with    Altered Mental Status     Normally A&Ox3. Currently A&Ox2. Oriented to person and situation.    Abnormal Labs     , creat 6.43, K 5.7. Sent for concern of kidney failure.         History of Present Illness:  Kyle Argueta is a 69 y.o. year old male patient with Past Medical History of avascular necrosis of bilateral hips and is wheelchair bound, EtOH abuse and hypertension presents to [facility] emergency department chief complaint of altered mental status.  According to ER documentation patient is normally alert and oriented x 3.  Currently on admission, patient is awake and alert to self only.  Overall he is a poor historian but denies any pain, dysuria, hematuria or suprapubic discomfort.  Patient denies having any fevers or chills.  Patient resides in a nursing facility due to his ambulatory dysfunction.  He was sent in for evaluation for his change in mental status.    On arrival to the emergency department afebrile 36.4,  sinus tach, RR 18, BP 70/48, 95% on 2 L nasal cannula.  Twelve-lead EKG shows normal sinus rhythm with ventricular 91.  No acute ischemic changes.  Normal QTc.  Sodium 132 and potassium 4.9, chloride 95, bicarb 20, anion gap 22, , creatinine 6.11 with a GFR 9.  LFTs within normal limits.  Lactate 0.8.  INR 1.3.  WBC 12.4, neutrophils 9.78, hemoglobin 9.6, hematocrit 35.2, platelet count 360.  Urinalysis shows moderate leukocyte esterase with 2+ bacteria.  No nitrates.  Single view of the chest no acute cardiopulmonary process.  Due to patient's hypotension he was given 2 L of crystalloids with improvement in his blood pressure but did require low-dose norepinephrine and will  "be admitted to the ICU for further care.    Interval ICU Events:  Admitted to ICU for acute renal failure secondary to dehydration/UTI on norepinephrine.    Medical History:  As noted above    No past surgical history on file.  (Not in a hospital admission)    Patient has no known allergies.     No family history on file.    Review of Systems:  14 point review of systems was completed and negative except for those specially mention in my HPI    Physical Exam:    Heart Rate:  []   Temperature:  [36.4 °C (97.5 °F)-36.7 °C (98.1 °F)]   Respirations:  [18-20]   BP: ()/(48-59)   Height:  [167.6 cm (5' 6\")]   Weight:  [74.8 kg (165 lb)]   Pulse Ox:  [94 %-100 %]     Physical Exam  Constitutional:       General: He is not in acute distress.     Appearance: He is obese. He is ill-appearing.   HENT:      Head: Normocephalic and atraumatic.      Nose: Nose normal.      Mouth/Throat:      Mouth: Mucous membranes are dry.      Pharynx: Oropharynx is clear.   Eyes:      Extraocular Movements: Extraocular movements intact.      Pupils: Pupils are equal, round, and reactive to light.   Cardiovascular:      Rate and Rhythm: Normal rate and regular rhythm.      Pulses: Normal pulses.      Heart sounds: Normal heart sounds.   Pulmonary:      Effort: Pulmonary effort is normal.      Breath sounds: Normal breath sounds.   Abdominal:      General: Bowel sounds are normal.      Palpations: Abdomen is soft.   Musculoskeletal:         General: Normal range of motion.   Skin:     General: Skin is warm.      Capillary Refill: Capillary refill takes less than 2 seconds.   Neurological:      General: No focal deficit present.      Mental Status: He is alert. He is disoriented.   Psychiatric:         Mood and Affect: Mood normal.         Behavior: Behavior normal.     Objective:    Results for orders placed or performed during the hospital encounter of 09/09/24 (from the past 24 hour(s))   CBC and Auto Differential   Result Value " Ref Range    WBC 12.1 (H) 4.4 - 11.3 x10*3/uL    nRBC 0.0 0.0 - 0.0 /100 WBCs    RBC 3.56 (L) 4.50 - 5.90 x10*6/uL    Hemoglobin 11.6 (L) 13.5 - 17.5 g/dL    Hematocrit 35.2 (L) 41.0 - 52.0 %    MCV 99 80 - 100 fL    MCH 32.6 26.0 - 34.0 pg    MCHC 33.0 32.0 - 36.0 g/dL    RDW 13.0 11.5 - 14.5 %    Platelets 360 150 - 450 x10*3/uL    Neutrophils % 81.0 40.0 - 80.0 %    Immature Granulocytes %, Automated 0.5 0.0 - 0.9 %    Lymphocytes % 10.7 13.0 - 44.0 %    Monocytes % 7.4 2.0 - 10.0 %    Eosinophils % 0.2 0.0 - 6.0 %    Basophils % 0.2 0.0 - 2.0 %    Neutrophils Absolute 9.78 (H) 1.20 - 7.70 x10*3/uL    Immature Granulocytes Absolute, Automated 0.06 0.00 - 0.70 x10*3/uL    Lymphocytes Absolute 1.30 1.20 - 4.80 x10*3/uL    Monocytes Absolute 0.90 0.10 - 1.00 x10*3/uL    Eosinophils Absolute 0.03 0.00 - 0.70 x10*3/uL    Basophils Absolute 0.03 0.00 - 0.10 x10*3/uL   Basic metabolic panel   Result Value Ref Range    Glucose 132 (H) 74 - 99 mg/dL    Sodium 132 (L) 136 - 145 mmol/L    Potassium 4.9 3.5 - 5.3 mmol/L    Chloride 95 (L) 98 - 107 mmol/L    Bicarbonate 20 (L) 21 - 32 mmol/L    Anion Gap 22 (H) 10 - 20 mmol/L    Urea Nitrogen 127 (HH) 6 - 23 mg/dL    Creatinine 6.11 (H) 0.50 - 1.30 mg/dL    eGFR 9 (L) >60 mL/min/1.73m*2    Calcium 9.5 8.6 - 10.3 mg/dL   Hepatic function panel   Result Value Ref Range    Albumin 3.9 3.4 - 5.0 g/dL    Bilirubin, Total 0.6 0.0 - 1.2 mg/dL    Bilirubin, Direct 0.1 0.0 - 0.3 mg/dL    Alkaline Phosphatase 89 33 - 136 U/L    ALT 24 10 - 52 U/L    AST 28 9 - 39 U/L    Total Protein 8.5 (H) 6.4 - 8.2 g/dL   Lactate   Result Value Ref Range    Lactate 0.8 0.4 - 2.0 mmol/L   Protime-INR   Result Value Ref Range    Protime 14.5 (H) 9.8 - 12.8 seconds    INR 1.3 (H) 0.9 - 1.1   Ethanol   Result Value Ref Range    Alcohol <10 <=10 mg/dL   ECG 12 lead   Result Value Ref Range    Ventricular Rate 91 BPM    Atrial Rate 91 BPM    ME Interval 140 ms    QRS Duration 90 ms    QT Interval 348 ms     QTC Calculation(Bazett) 428 ms    P Axis 63 degrees    R Axis -40 degrees    T Axis 37 degrees    QRS Count 15 beats    Q Onset 214 ms    P Onset 144 ms    P Offset 190 ms    T Offset 388 ms    QTC Fredericia 400 ms   Urinalysis with Reflex Culture and Microscopic   Result Value Ref Range    Color, Urine Light-Yellow Light-Yellow, Yellow, Dark-Yellow    Appearance, Urine Turbid (N) Clear    Specific Gravity, Urine 1.017 1.005 - 1.035    pH, Urine 5.5 5.0, 5.5, 6.0, 6.5, 7.0, 7.5, 8.0    Protein, Urine 30 (1+) (A) NEGATIVE, 10 (TRACE), 20 (TRACE) mg/dL    Glucose, Urine Normal Normal mg/dL    Blood, Urine 0.2 (2+) (A) NEGATIVE    Ketones, Urine NEGATIVE NEGATIVE mg/dL    Bilirubin, Urine NEGATIVE NEGATIVE    Urobilinogen, Urine Normal Normal mg/dL    Nitrite, Urine NEGATIVE NEGATIVE    Leukocyte Esterase, Urine 500 Jose Enrique/µL (A) NEGATIVE   Microscopic Only, Urine   Result Value Ref Range    WBC, Urine >50 (A) 1-5, NONE /HPF    RBC, Urine 3-5 NONE, 1-2, 3-5 /HPF    Squamous Epithelial Cells, Urine 1-9 (SPARSE) Reference range not established. /HPF    Bacteria, Urine 2+ (A) NONE SEEN /HPF    Hyaline Casts, Urine 1+ (A) NONE /LPF      XR chest 1 view    Result Date: 9/9/2024  STUDY: Chest Radiograph;  9/9/2024 5:35PM INDICATION: Patient reports weakness. COMPARISON: None available. ACCESSION NUMBER(S): [id number] ORDERING CLINICIAN: MIKA CALDERON TECHNIQUE:  Frontal chest was obtained at 17:34 hours. FINDINGS: CARDIOMEDIASTINAL SILHOUETTE: Cardiomediastinal silhouette is normal in size and configuration.  LUNGS: Lungs are clear.  ABDOMEN: No remarkable upper abdominal findings.  BONES: No acute osseous changes.    No acute process. Signed by Neftaly Lewis MD    ECG 12 lead    Result Date: 9/9/2024  Normal sinus rhythm Left axis deviation Abnormal ECG No previous ECGs available    Assessment/Plan:    I am currently managing this critically ill patient for the following problems:    Neuro/Psych/Pain  Ctrl/Sedation:  Metabolic Encephalopathy  Hx ETOH abuse   -Secondary to UTI     Respiratory/ENT:  -On 2 Liters Nasal Cannula   -Wean O2 as tolerated     Cardiovascular:  Hypotension without shock  -Norepinephrine- titrate for Maps >60  -2 Liters of Crystalloids in ED  -Maintenance Fluids overnight     GI:  -No acute issues  -Diet as tolerated    Renal/Volume Status (Intra & Extravascular):  Acute Renal Failure  -Baseline creatinine normal  -Creatinine admission 6.11 GFR 9  -Pre-renal secondary to hypotension and dehydration  -IVF maintenance/Bun & Cr monitor  -Avoid nephrotoxic agents  -PVR q4 hours to rule out urinary retention   -Hold lisinopril/hydrochlorothiazide  -Check renal ultrasound  -Consult nephrology    Endocrine  No issues to date    Infectious Disease:  UTI  -Follow urine/blood cultures  -1 g Ceftriaxone daily     Heme/Onc:  DVT prophylaxis  Heparin subcutaneous     OBGYN/MSK:  AVN bilateral hips   PT/OT when appropriate   Wheelchair-bound at baseline    Ethics/Code Status:  Full Code     :  DVT Prophylaxis: subcutaneous heparin   GI Prophylaxis: NA  Bowel Regimen: NA   Diet: Regular Diet   CVC: NA  [place]: NA  [facility]: NA   Restraints: NA  Dispo: ICU    35 minutes spent in preparing to see patient (I.e. review of medical records), evaluation of diagnostics (I.e. labs, imaging, etc.), documentation, discussing plan of care with patient/ family/ caregiver, and/ or coordination of care with multidisciplinary team. Time does not include completion of procedure time.     Isha Bazan, CNP-APRN  Pulmonary and Critical Care Medicine   [phone]

## 2024-09-11 VITALS
TEMPERATURE: 98.1 F | BODY MASS INDEX: 27.67 KG/M2 | OXYGEN SATURATION: 97 % | HEART RATE: 102 BPM | WEIGHT: 172.18 LBS | DIASTOLIC BLOOD PRESSURE: 55 MMHG | SYSTOLIC BLOOD PRESSURE: 113 MMHG | RESPIRATION RATE: 22 BRPM | HEIGHT: 66 IN

## 2024-09-11 LAB
ALBUMIN SERPL BCP-MCNC: 3 G/DL (ref 3.4–5)
ALP SERPL-CCNC: 70 U/L (ref 33–136)
ALT SERPL W P-5'-P-CCNC: 18 U/L (ref 10–52)
ANION GAP SERPL CALC-SCNC: 16 MMOL/L (ref 10–20)
AST SERPL W P-5'-P-CCNC: 26 U/L (ref 9–39)
BILIRUB SERPL-MCNC: 0.5 MG/DL (ref 0–1.2)
BUN SERPL-MCNC: 47 MG/DL (ref 6–23)
CALCIUM SERPL-MCNC: 8.6 MG/DL (ref 8.6–10.3)
CHLORIDE SERPL-SCNC: 107 MMOL/L (ref 98–107)
CO2 SERPL-SCNC: 20 MMOL/L (ref 21–32)
CREAT SERPL-MCNC: 0.93 MG/DL (ref 0.5–1.3)
EGFRCR SERPLBLD CKD-EPI 2021: 89 ML/MIN/1.73M*2
ERYTHROCYTE [DISTWIDTH] IN BLOOD BY AUTOMATED COUNT: 13.1 % (ref 11.5–14.5)
GLUCOSE SERPL-MCNC: 92 MG/DL (ref 74–99)
HCT VFR BLD AUTO: 26.8 % (ref 41–52)
HGB BLD-MCNC: 8.8 G/DL (ref 13.5–17.5)
HGB RETIC QN: 34 PG (ref 28–38)
IMMATURE RETIC FRACTION: 11.3 %
MAGNESIUM SERPL-MCNC: 2.01 MG/DL (ref 1.6–2.4)
MCH RBC QN AUTO: 32.4 PG (ref 26–34)
MCHC RBC AUTO-ENTMCNC: 32.8 G/DL (ref 32–36)
MCV RBC AUTO: 99 FL (ref 80–100)
NRBC BLD-RTO: 0 /100 WBCS (ref 0–0)
PLATELET # BLD AUTO: 214 X10*3/UL (ref 150–450)
POTASSIUM SERPL-SCNC: 4.5 MMOL/L (ref 3.5–5.3)
PROT SERPL-MCNC: 6.4 G/DL (ref 6.4–8.2)
RBC # BLD AUTO: 2.72 X10*6/UL (ref 4.5–5.9)
RETICS #: 0.03 X10*6/UL (ref 0.02–0.11)
RETICS/RBC NFR AUTO: 1.2 % (ref 0.5–2)
SODIUM SERPL-SCNC: 138 MMOL/L (ref 136–145)
WBC # BLD AUTO: 6.1 X10*3/UL (ref 4.4–11.3)

## 2024-09-11 PROCEDURE — 85045 AUTOMATED RETICULOCYTE COUNT: CPT | Performed by: INTERNAL MEDICINE

## 2024-09-11 PROCEDURE — 36415 COLL VENOUS BLD VENIPUNCTURE: CPT | Performed by: INTERNAL MEDICINE

## 2024-09-11 PROCEDURE — 2500000004 HC RX 250 GENERAL PHARMACY W/ HCPCS (ALT 636 FOR OP/ED): Performed by: SURGERY

## 2024-09-11 PROCEDURE — 80053 COMPREHEN METABOLIC PANEL: CPT | Performed by: NURSE PRACTITIONER

## 2024-09-11 PROCEDURE — 85027 COMPLETE CBC AUTOMATED: CPT | Performed by: INTERNAL MEDICINE

## 2024-09-11 PROCEDURE — 2500000001 HC RX 250 WO HCPCS SELF ADMINISTERED DRUGS (ALT 637 FOR MEDICARE OP): Performed by: SURGERY

## 2024-09-11 PROCEDURE — 83735 ASSAY OF MAGNESIUM: CPT | Performed by: NURSE PRACTITIONER

## 2024-09-11 PROCEDURE — 99238 HOSP IP/OBS DSCHRG MGMT 30/<: CPT | Performed by: SURGERY

## 2024-09-11 RX ORDER — AMOXICILLIN AND CLAVULANATE POTASSIUM 875; 125 MG/1; MG/1
1 TABLET, FILM COATED ORAL 2 TIMES DAILY
Qty: 10 TABLET | Refills: 0 | Status: SHIPPED | OUTPATIENT
Start: 2024-09-11 | End: 2024-09-16

## 2024-09-11 ASSESSMENT — LIFESTYLE VARIABLES
ANXIETY: MILDLY ANXIOUS
AUDITORY DISTURBANCES: NOT PRESENT
ANXIETY: MILDLY ANXIOUS
AGITATION: NORMAL ACTIVITY
AGITATION: NORMAL ACTIVITY
VISUAL DISTURBANCES: NOT PRESENT
TOTAL SCORE: 5
PAROXYSMAL SWEATS: NO SWEAT VISIBLE
PAROXYSMAL SWEATS: NO SWEAT VISIBLE
ORIENTATION AND CLOUDING OF SENSORIUM: DISORIENTED FOR DATE BY NO MORE THAN 2 CALENDAR DAYS
TREMOR: NOT VISIBLE, BUT CAN BE FELT FINGERTIP TO FINGERTIP
TREMOR: NO TREMOR
NAUSEA AND VOMITING: NO NAUSEA AND NO VOMITING
ANXIETY: MILDLY ANXIOUS
VISUAL DISTURBANCES: NOT PRESENT
HEADACHE, FULLNESS IN HEAD: NOT PRESENT
AUDITORY DISTURBANCES: NOT PRESENT
VISUAL DISTURBANCES: NOT PRESENT
TREMOR: NO TREMOR
TOTAL SCORE: 5
NAUSEA AND VOMITING: NO NAUSEA AND NO VOMITING
PAROXYSMAL SWEATS: NO SWEAT VISIBLE
ORIENTATION AND CLOUDING OF SENSORIUM: DISORIENTED FOR DATE BY MORE THAN 2 CALENDAR DAYS
ORIENTATION AND CLOUDING OF SENSORIUM: DISORIENTED FOR DATE BY MORE THAN 2 CALENDAR DAYS
HEADACHE, FULLNESS IN HEAD: NOT PRESENT
TOTAL SCORE: 4
NAUSEA AND VOMITING: NO NAUSEA AND NO VOMITING
AGITATION: NORMAL ACTIVITY
NAUSEA AND VOMITING: NO NAUSEA AND NO VOMITING
AUDITORY DISTURBANCES: NOT PRESENT
AUDITORY DISTURBANCES: NOT PRESENT
AGITATION: NORMAL ACTIVITY
VISUAL DISTURBANCES: NOT PRESENT
HEADACHE, FULLNESS IN HEAD: NOT PRESENT
HEADACHE, FULLNESS IN HEAD: NOT PRESENT
TREMOR: NOT VISIBLE, BUT CAN BE FELT FINGERTIP TO FINGERTIP
TOTAL SCORE: 4
PAROXYSMAL SWEATS: NO SWEAT VISIBLE
ORIENTATION AND CLOUDING OF SENSORIUM: DISORIENTED FOR DATE BY MORE THAN 2 CALENDAR DAYS
ANXIETY: 2

## 2024-09-11 ASSESSMENT — COGNITIVE AND FUNCTIONAL STATUS - GENERAL
STANDING UP FROM CHAIR USING ARMS: A LITTLE
WALKING IN HOSPITAL ROOM: A LOT
MOBILITY SCORE: 17
PERSONAL GROOMING: A LITTLE
MOVING TO AND FROM BED TO CHAIR: A LITTLE
CLIMB 3 TO 5 STEPS WITH RAILING: TOTAL
TOILETING: A LITTLE
DAILY ACTIVITIY SCORE: 17
DRESSING REGULAR UPPER BODY CLOTHING: A LITTLE
HELP NEEDED FOR BATHING: A LOT
EATING MEALS: A LITTLE
DRESSING REGULAR LOWER BODY CLOTHING: A LITTLE

## 2024-09-11 ASSESSMENT — PAIN - FUNCTIONAL ASSESSMENT
PAIN_FUNCTIONAL_ASSESSMENT: 0-10

## 2024-09-11 ASSESSMENT — PAIN SCALES - GENERAL
PAINLEVEL_OUTOF10: 0 - NO PAIN

## 2024-09-11 NOTE — CARE PLAN
The patient's goals for the shift include      The clinical goals for the shift include Patient will remain free of injury throughout shift    Patient meeting goals plans to dc back to [facility]

## 2024-09-11 NOTE — PROGRESS NOTES
09/11/24 0950   Discharge Planning   Home or Post Acute Services Post acute facilities (Rehab/SNF/etc)   Type of Post Acute Facility Services Rehab;Skilled nursing   Expected Discharge Disposition SNF  ([facility])   Does the patient need discharge transport arranged? Yes   RoundTrip coordination needed? Yes   Has discharge transport been arranged? Yes   What day is the transport expected? 09/11/24   What time is the transport expected? 1230     Per Interdisciplinary rounding report with team, pt who was sent from [facility] due to increased confusion, pt admitted with DX acute renal failure- new onset and UTI, is now off Precedex x 24 hours, has new [facility] placed, and will DC with [facility] and PO antibiotics, ready to return to SNF today. Met with pt, who is now awake and more alert and oriented, pt confirms was at [facility] prior to admission and is agreeable to DC return to [facility] today who confirms can accept pt today, no need for new auth if returns today. DSC notified to set up transport at 12:30 PM to return to [facility]. Pts RN given report number [phone]. Called and left message notifying pt emergency contact of pt return to SNF today.    Update: pts [facility] discontinued prior to DC, GF was updated for SNF, and to straight cath in 8 hours if pt unable to void. AVS also sent to SNF.

## 2024-09-11 NOTE — NURSING NOTE
Called report to [phone] to nurse. They are unaware of which room he is going into. Will return with bere.

## 2024-09-11 NOTE — CARE PLAN
The patient's goals for the shift include      The clinical goals for the shift include patient will remain free of injury throughout shift      Pt is progressing

## 2024-09-11 NOTE — PROGRESS NOTES
Renal Progress Note    Assessment/     69 y.o. year old male who presented to the emergency department for further evaluation and management of change in mental status at the emergency department the patient was awake and alert to self only did not provide any history he was found by the nursing home staff where he resides that he has difficulty in ambulation and his mental sharpness and capacity they sent to the emergency department for evaluation where he was found to have a blood pressure of 70/48 with metabolic acidosis bicarb of 20 and a gap of 22 with BUN and creatinine 127 and 6.1 no known chronic kidney disease hemoglobin is 9.6 and adequate platelet at 360 patient did receive fluids and admitted to critical care bed this clinical presentation did take place in a patient with avascular necrosis of bilateral hips EtOH abuse and essential hypertension     Acute kidney injury with associated metabolic acidosis secondary to prerenal azotemia and or obstructive uropathy no NSAID use no ACE or ARB no recent contrast exposure     Plan/  Based on clinical and laboratory assessment patient can be discharged from the kidney standpoint discussed with Dr. Gomez we will sign off thank you so much for referral please do not hesitate to call as needed     Admit Date: 9/9/2024    Interval History: Patient seen and examined uneventful night no uremic related or fluid volume overload related symptoms      Medications:   Scheduled Meds:cefTRIAXone, 1 g, intravenous, q24h  folic acid, 1 mg, oral, Daily  heparin (porcine), 5,000 Units, subcutaneous, q8h ISAIAH  multivitamin with minerals, 1 tablet, oral, Daily  PHENobarbital, 65 mg, intravenous, q8h   Followed by  [START ON 9/12/2024] PHENobarbital, 32.5 mg, intravenous, q8h  thiamine, 100 mg, oral, Daily      Continuous Infusions:lactated Ringer's, 75 mL/hr, Last Rate: Stopped (09/11/24 1200)        CBC:   Lab Results   Component Value Date    HGB 8.8 (L) 09/11/2024    HGB 9.4  "(L) 09/10/2024    WBC 6.1 09/11/2024    WBC 5.4 09/10/2024     09/11/2024     09/10/2024      Anemia:    Lab Results   Component Value Date    FERRITIN 872 (H) 09/10/2024    IRON 84 09/10/2024    TIBC 172 (L) 09/10/2024      BMP:    Lab Results   Component Value Date     09/11/2024     09/10/2024    K 4.5 09/11/2024    K 4.9 09/10/2024     09/11/2024     (H) 09/10/2024    CO2 20 (L) 09/11/2024    CO2 21 09/10/2024    BUN 47 (H) 09/11/2024    BUN 80 (H) 09/10/2024    CREATININE 0.93 09/11/2024    CREATININE 1.64 (H) 09/10/2024      Bone disease:   Lab Results   Component Value Date    PHOS 4.3 09/10/2024      Urinalysis:    Lab Results   Component Value Date    TEAGAN 5.5 09/09/2024    PROTUR 30 (1+) (A) 09/09/2024    GLUCOSEU Normal 09/09/2024    BLOODU 0.2 (2+) (A) 09/09/2024    KETONESU NEGATIVE 09/09/2024    BILIRUBINU NEGATIVE 09/09/2024    NITRITEU NEGATIVE 09/09/2024    LEUKOCYTESU 500 Jose Enrique/µL (A) 09/09/2024        Objective:   Vitals: /55 (BP Location: Right arm, Patient Position: Lying)   Pulse 102   Temp 36.7 °C (98.1 °F) (Temporal)   Resp 22   Ht 1.676 m (5' 6\")   Wt 78.1 kg (172 lb 2.9 oz)   SpO2 97%   BMI 27.79 kg/m²    Wt Readings from Last 3 Encounters:   09/11/24 78.1 kg (172 lb 2.9 oz)      24HR INTAKE/OUTPUT:    Intake/Output Summary (Last 24 hours) at 9/11/2024 1236  Last data filed at 9/11/2024 1223  Gross per 24 hour   Intake 4834.18 ml   Output 3310 ml   Net 1524.18 ml     Admission weight:  Weight: 74.8 kg (165 lb)      Constitutional:  Alert, awake, no apparent distress   Skin:normal, no rash  HEENT:sclera anicteric.  Head atraumatic normocephalic  Neck:supple with no thyromegally  Cardiovascular:  S1, S2 without m/r/g   Respiratory:  CTA B without w/r/r   Abdomen: +bs, soft, nt  Ext: no LE edema  Musculoskeletal:Intact  Neuro:Alert and oriented with no deficit      Electronically signed by Deandre Estevez MD on 9/11/2024 at 12:36 PM            "

## 2024-09-11 NOTE — DISCHARGE SUMMARY
Discharge Diagnosis  Acute renal failure, unspecified acute renal failure type (CMS-HCC)    Issues Requiring Follow-Up  None    Test Results Pending At Discharge  Pending Labs       Order Current Status    Extra Urine Gray Tube Collected (09/09/24 1911)    Urinalysis with Reflex Culture and Microscopic In process    Blood Culture Preliminary result    Blood Culture Preliminary result    Urine Culture Preliminary result            Hospital Course   Patient presented to their medical hospital on September 9 with acute encephalopathy, acute renal failure, and urosepsis.  He was admitted to the ICU for close monitoring of his urine output and encephalopathy.  He slowly improved with hydration, IV antibiotics.  There is also concern for alcohol withdrawal started on a phenobarb taper.  He significant improved with recovery of his renal function on September 11.  He was discharged back to his facility with completion course of his antibiotics and no need for any further follow-up.    Pertinent Physical Exam At Time of Discharge  Physical Exam  GeN: NAD, Aax3  Heart: RRR  Lungs: CTAB  Abd; Soft, ND, NTTP  Home Medications     Medication List      START taking these medications     amoxicillin-pot clavulanate 875-125 mg tablet; Commonly known as:   Augmentin; Take 1 tablet by mouth 2 times a day for 5 days.       Outpatient Follow-Up  No future appointments.    Carlos Gomez MD

## 2024-09-12 LAB — BACTERIA UR CULT: ABNORMAL

## 2024-09-14 LAB
BACTERIA BLD CULT: NORMAL
BACTERIA BLD CULT: NORMAL

## 2024-09-15 LAB
ATRIAL RATE: 91 BPM
P AXIS: 63 DEGREES
P OFFSET: 190 MS
P ONSET: 144 MS
PR INTERVAL: 140 MS
Q ONSET: 214 MS
QRS COUNT: 15 BEATS
QRS DURATION: 90 MS
QT INTERVAL: 348 MS
QTC CALCULATION(BAZETT): 428 MS
QTC FREDERICIA: 400 MS
R AXIS: -40 DEGREES
T AXIS: 37 DEGREES
T OFFSET: 388 MS
VENTRICULAR RATE: 91 BPM

## 2024-09-18 NOTE — DOCUMENTATION CLARIFICATION NOTE
"    PATIENT:               JUAN FINK  ACCT #:                  [id number]  MRN:                       [id number]  :                       1955  ADMIT DATE:       2024 4:05 PM  DISCH DATE:        2024 12:56 PM  RESPONDING PROVIDER #:        [id number]          PROVIDER RESPONSE TEXT:    Sepsis with cardiac organ dysfunction of septic shock    CDI QUERY TEXT:    Clarification    Instruction:    Based on your assessment of the patient and the clinical information, please provide the requested documentation by clicking on the appropriate radio button and enter any additional information if prompted.    Question: Is there a diagnosis indicative of a patient meeting SIRS criteria and with organ dysfunction in the setting of UTI infection    When answering this query, please exercise your independent professional judgment. The fact that a question is being asked, does not imply that any particular answer is desired or expected.    The patient's clinical indicators include:  Clinical Information: Pt presents w/ ams and found to have uti, swati and metabolic encephalopathy.    Clinical Indicators:    Per the H/P dated 24 - \" Due to patient's hypotension he was given 2 L of crystalloids with improvement in his blood pressure but did require low-dose norepinephrine and will be admitted to the ICU for further care.\"    Per the cc dpn dated 9/10/24 - \"Cardiovascular: Off levo -Will give IVF bolus as needed for hypotension, likely due to hypovolemia/septic shock. Acute Renal Failure - likely 2/2 sepsis/hypovolemia\"    Per the d/c summary on 24 - \"Patient presented to their medical hospital on  with acute encephalopathy, acute renal failure, and urosepsis.\"    Treatment: icu monitoring, levo gtt, trend labs    Risk Factors: UTI  Options provided:  -- Sepsis with cardiac organ dysfunction of septic shock  -- Other - I will add my own diagnosis  -- Refer to Clinical Documentation Reviewer    Query " created by: Brannon Quick on 9/18/2024 12:33 PM      Electronically signed by:  RAJAN FITZPATRICK MD 9/18/2024 1:37 PM

## 2024-10-09 ENCOUNTER — PRE-ADMISSION TESTING (OUTPATIENT)
Dept: PREADMISSION TESTING | Facility: HOSPITAL | Age: 69
End: 2024-10-09
Payer: MEDICARE

## 2024-10-09 ENCOUNTER — LAB (OUTPATIENT)
Dept: LAB | Facility: LAB | Age: 69
End: 2024-10-09
Payer: MEDICARE

## 2024-10-09 VITALS
DIASTOLIC BLOOD PRESSURE: 62 MMHG | HEIGHT: 66 IN | WEIGHT: 170.64 LBS | TEMPERATURE: 98.4 F | RESPIRATION RATE: 15 BRPM | HEART RATE: 104 BPM | SYSTOLIC BLOOD PRESSURE: 131 MMHG | BODY MASS INDEX: 27.42 KG/M2 | OXYGEN SATURATION: 95 %

## 2024-10-09 DIAGNOSIS — M16.12 OSTEOARTHRITIS OF LEFT HIP, UNSPECIFIED OSTEOARTHRITIS TYPE: ICD-10-CM

## 2024-10-09 DIAGNOSIS — Z01.818 PREOP EXAMINATION: Primary | ICD-10-CM

## 2024-10-09 DIAGNOSIS — Z01.818 PRE-OP TESTING: ICD-10-CM

## 2024-10-09 DIAGNOSIS — Z01.818 PREOP EXAMINATION: ICD-10-CM

## 2024-10-09 LAB
ABO GROUP (TYPE) IN BLOOD: NORMAL
ANION GAP SERPL CALC-SCNC: 14 MMOL/L (ref 10–20)
ANTIBODY SCREEN: NORMAL
BUN SERPL-MCNC: 19 MG/DL (ref 6–23)
CALCIUM SERPL-MCNC: 9.3 MG/DL (ref 8.6–10.3)
CHLORIDE SERPL-SCNC: 102 MMOL/L (ref 98–107)
CO2 SERPL-SCNC: 27 MMOL/L (ref 21–32)
CREAT SERPL-MCNC: 0.73 MG/DL (ref 0.5–1.3)
EGFRCR SERPLBLD CKD-EPI 2021: >90 ML/MIN/1.73M*2
ERYTHROCYTE [DISTWIDTH] IN BLOOD BY AUTOMATED COUNT: 14.5 % (ref 11.5–14.5)
GLUCOSE SERPL-MCNC: 90 MG/DL (ref 74–99)
HCT VFR BLD AUTO: 30.9 % (ref 41–52)
HGB BLD-MCNC: 9.7 G/DL (ref 13.5–17.5)
MCH RBC QN AUTO: 32.2 PG (ref 26–34)
MCHC RBC AUTO-ENTMCNC: 31.4 G/DL (ref 32–36)
MCV RBC AUTO: 103 FL (ref 80–100)
NRBC BLD-RTO: 0 /100 WBCS (ref 0–0)
PLATELET # BLD AUTO: 263 X10*3/UL (ref 150–450)
POTASSIUM SERPL-SCNC: 4.5 MMOL/L (ref 3.5–5.3)
RBC # BLD AUTO: 3.01 X10*6/UL (ref 4.5–5.9)
RH FACTOR (ANTIGEN D): NORMAL
SODIUM SERPL-SCNC: 138 MMOL/L (ref 136–145)
WBC # BLD AUTO: 6.6 X10*3/UL (ref 4.4–11.3)

## 2024-10-09 PROCEDURE — 86850 RBC ANTIBODY SCREEN: CPT

## 2024-10-09 PROCEDURE — 86900 BLOOD TYPING SEROLOGIC ABO: CPT

## 2024-10-09 PROCEDURE — 99202 OFFICE O/P NEW SF 15 MIN: CPT | Performed by: NURSE PRACTITIONER

## 2024-10-09 PROCEDURE — 36415 COLL VENOUS BLD VENIPUNCTURE: CPT

## 2024-10-09 PROCEDURE — 80048 BASIC METABOLIC PNL TOTAL CA: CPT

## 2024-10-09 PROCEDURE — 85027 COMPLETE CBC AUTOMATED: CPT

## 2024-10-09 PROCEDURE — 87081 CULTURE SCREEN ONLY: CPT | Mod: STJLAB

## 2024-10-09 PROCEDURE — 86901 BLOOD TYPING SEROLOGIC RH(D): CPT

## 2024-10-09 RX ORDER — CHLORHEXIDINE GLUCONATE ORAL RINSE 1.2 MG/ML
SOLUTION DENTAL
Qty: 473 ML | Refills: 0 | Status: SHIPPED | OUTPATIENT
Start: 2024-10-09

## 2024-10-09 RX ORDER — LISINOPRIL 20 MG/1
20 TABLET ORAL DAILY
COMMUNITY

## 2024-10-09 RX ORDER — CHLORHEXIDINE GLUCONATE ORAL RINSE 1.2 MG/ML
SOLUTION DENTAL
Qty: 473 ML | Refills: 0 | Status: SHIPPED | OUTPATIENT
Start: 2024-10-09 | End: 2024-10-09 | Stop reason: ENTERED-IN-ERROR

## 2024-10-09 ASSESSMENT — DUKE ACTIVITY SCORE INDEX (DASI)
DASI METS SCORE: 3.3
TOTAL_SCORE: 4.5
CAN YOU DO YARD WORK LIKE RAKING LEAVES, WEEDING OR PUSHING A MOWER: NO
CAN YOU HAVE SEXUAL RELATIONS: NO
CAN YOU PARTICIPATE IN STRENOUS SPORTS LIKE SWIMMING, SINGLES TENNIS, FOOTBALL, BASKETBALL, OR SKIING: NO
CAN YOU TAKE CARE OF YOURSELF (EAT, DRESS, BATHE, OR USE TOILET): YES
CAN YOU WALK A BLOCK OR TWO ON LEVEL GROUND: NO
CAN YOU PARTICIPATE IN MODERATE RECREATIONAL ACTIVITIES LIKE GOLF, BOWLING, DANCING, DOUBLES TENNIS OR THROWING A BASEBALL OR FOOTBALL: NO
CAN YOU RUN A SHORT DISTANCE: NO
CAN YOU DO LIGHT WORK AROUND THE HOUSE LIKE DUSTING OR WASHING DISHES: NO
CAN YOU CLIMB A FLIGHT OF STAIRS OR WALK UP A HILL: NO
CAN YOU WALK INDOORS, SUCH AS AROUND YOUR HOUSE: YES
CAN YOU DO HEAVY WORK AROUND THE HOUSE LIKE SCRUBBING FLOORS OR LIFTING AND MOVING HEAVY FURNITURE: NO
CAN YOU DO MODERATE WORK AROUND THE HOUSE LIKE VACUUMING, SWEEPING FLOORS OR CARRYING GROCERIES: NO

## 2024-10-09 ASSESSMENT — LIFESTYLE VARIABLES: SMOKING_STATUS: NONSMOKER

## 2024-10-09 ASSESSMENT — ACTIVITIES OF DAILY LIVING (ADL): ADL_SCORE: 1

## 2024-10-09 NOTE — CPM/PAT H&P
"CPM/PAT Evaluation       Name: Kyle Argueta (Kyle Argueta)  /Age: 1955/69 y.o.     In-Person       Chief Complaint: Preop appointment scheduled left total hip replacement    HPI    JIMMY is a 68 yo male who has longstanding bilateral hip pains with left being worse than right.  Imaging has shown avascular necrosis of both hips.  Treatment options discussed with orthopedic surgeon and it has been decided to pursue a left total hip replacement, with right total hip replacement soon to follow. Presents to [facility] today for perioperative risk stratification and optimization. He endorses having left hip \"excruciating\" pains with limited ROM, using wheelchair mostly being unable to bear much weight on this hip-he is considered nonfunctioning.  Recent bilateral steroid injections both hips were provided in the last month, with no relief. Skin is intact to surgical limb per patient.     Of note, he has been recently hospitalized from  through 2024 from nursing home with acute encephalopathy, acute renal failure and urosepsis stabilized and discharged with oral antibiotics.  Follow-up appointment with primary care was completed and documented.     Past Medical History:   Diagnosis Date    Alcohol abuse     Anemia     Arthritis     Avascular necrosis of bones of both hips (Multi)     Elevated liver enzymes     Hyperlipidemia     Hypertension      PCP: Dr. Morales    Past Surgical History:   Procedure Laterality Date    OTHER SURGICAL HISTORY      ORIF left ankle fracture; repair of tibio-fibular syndesmotic       Patient  reports that he is not currently sexually active.    Family History   Problem Relation Name Age of Onset    Cancer Mother         No Known Allergies    Prior to Admission medications    Medication Sig Start Date End Date Taking? Authorizing Provider   chlorhexidine (Peridex) 0.12 % solution Swish and spit 15 ml for 2 doses, 15mL the night before surgery and 15 ml morning of surgery - " swish for 30 seconds -DO NOT SWALLOW, SPIT OUT 10/9/24   Trenterivictor manuel Salgado APRN-CNP   lisinopril 20 mg tablet Take 1 tablet (20 mg) by mouth once daily.    Historical Provider, MD MARIYA ROSE   Constitutional: Negative for fever, chills, or sweats   ENMT: Negative for nasal discharge, congestion, ear pain, mouth pain, throat pain   Respiratory: Negative for cough, wheezing, shortness of breath   Cardiac: Negative for chest pain, dyspnea on exertion, palpitations   Gastrointestinal: Negative for nausea, vomiting, diarrhea, constipation, abdominal pain  Genitourinary: Negative for dysuria, flank pain, frequency, hematuria     Musculoskeletal: Positive for decreased ROM, pain, swelling, weakness in bilateral hips, left being worse than right-using wheelchair    Neurological: Negative for dizziness, confusion, headache  Psychiatric: Negative for mood changes   Skin: Negative for itching, rash, ulcer    Hematologic/Lymph: Negative for bruising, easy bleeding  Allergic/Immunologic: Negative itching, sneezing, swelling      Physical Exam  Constitutional:       Appearance: Normal appearance.   HENT:      Head: Normocephalic.      Mouth/Throat:      Mouth: Mucous membranes are moist.   Eyes:      Extraocular Movements: Extraocular movements intact.   Cardiovascular:      Rate and Rhythm: Normal rate and regular rhythm.   Pulmonary:      Effort: Pulmonary effort is normal.      Breath sounds: Normal breath sounds.   Abdominal:      General: Abdomen is flat.      Palpations: Abdomen is soft.   Musculoskeletal:      Cervical back: Normal range of motion.      Right hip: Tenderness present. Decreased range of motion. Decreased strength.      Left hip: Tenderness present. Decreased range of motion. Decreased strength.   Skin:     General: Skin is warm and dry.      Coloration: Skin is ashen.   Neurological:      General: No focal deficit present.      Mental Status: He is alert.   Psychiatric:         Mood and Affect: Mood  normal.          PAT AIRWAY:   Airway:     Neck ROM::  Full  normal        Visit Vitals  /62   Pulse 104   Temp 36.9 °C (98.4 °F) (Temporal)   Resp 15       DASI Risk Score      Flowsheet Row Pre-Admission Testing from 10/9/2024 in [facility]   Can you take care of yourself (eat, dress, bathe, or use toilet)?  2.75 filed at 10/09/2024 1113   Can you walk indoors, such as around your house? 1.75 filed at 10/09/2024 1113   Can you walk a block or two on level ground?  0 filed at 10/09/2024 1113   Can you climb a flight of stairs or walk up a hill? 0 filed at 10/09/2024 1113   Can you run a short distance? 0 filed at 10/09/2024 1113   Can you do light work around the house like dusting or washing dishes? 0 filed at 10/09/2024 1113   Can you do moderate work around the house like vacuuming, sweeping floors or carrying groceries? 0 filed at 10/09/2024 1113   Can you do heavy work around the house like scrubbing floors or lifting and moving heavy furniture?  0 filed at 10/09/2024 1113   Can you do yard work like raking leaves, weeding or pushing a mower? 0 filed at 10/09/2024 1113   Can you have sexual relations? 0 filed at 10/09/2024 1113   Can you participate in moderate recreational activities like golf, bowling, dancing, doubles tennis or throwing a baseball or football? 0 filed at 10/09/2024 1113   Can you participate in strenous sports like swimming, singles tennis, football, basketball, or skiing? 0 filed at 10/09/2024 1113   DASI SCORE 4.5 filed at 10/09/2024 1113   METS Score (Will be calculated only when all the questions are answered) 3.3 filed at 10/09/2024 1113          Caprini DVT Assessment      Flowsheet Row Pre-Admission Testing from 10/9/2024 in [facility] ED to Hosp-Admission (Discharged) from 9/9/2024 in [facility] Surgical Intensive Care with Carlos Gomez MD and Gwyn Macias MD   DVT Score 9 filed at 10/09/2024 1105 4 filed at 09/09/2024 1936    Medical Factors Sepsis <1 month filed at 10/09/2024 1105 --   Surgical Factors Elective major lower extremity arthroplasty filed at 10/09/2024 1105 --   BMI 30 or less filed at 10/09/2024 1105 30 or less filed at 09/09/2024 1936   RETIRED: Current Status -- Medical patient at bedrest filed at 09/09/2024 1936   RETIRED: Age -- 60-75 years filed at 09/09/2024 1936          Modified Frailty Index      Flowsheet Row Pre-Admission Testing from 10/9/2024 in [facility]   Non-independent functional status (problems with dressing, bathing, personal grooming, or cooking) 0 filed at 10/09/2024 1113   History of diabetes mellitus  0 filed at 10/09/2024 1113   History of COPD 0 filed at 10/09/2024 1113   History of CHF No filed at 10/09/2024 1113   History of MI 0 filed at 10/09/2024 1113   History of Percutaneous Coronary Intervention, Cardiac Surgery, or Angina No filed at 10/09/2024 1113   Hypertension requiring the use of medication  0.0909 filed at 10/09/2024 1113   Peripheral vascular disease 0 filed at 10/09/2024 1113   Impaired sensorium (cognitive impairement or loss, clouding, or delirium) 0 filed at 10/09/2024 1113   TIA or CVA withouy residual deficit 0 filed at 10/09/2024 1113   Cerebrovascular accident with deficit 0 filed at 10/09/2024 1113   Modified Frailty Index Calculator .0909 filed at 10/09/2024 1113          CHADS2 Stroke Risk  Current as of 43 minutes ago        N/A 3 to 100%: High Risk   2 to < 3%: Medium Risk   0 to < 2%: Low Risk     Last Change: N/A          This score determines the patient's risk of having a stroke if the patient has atrial fibrillation.        This score is not applicable to this patient. Components are not calculated.          Revised Cardiac Risk Index      Flowsheet Row Pre-Admission Testing from 10/9/2024 in [facility]   High-Risk Surgery (Intraperitoneal, Intrathoracic,Suprainguinal vascular) 0 filed at 10/09/2024 1113   History of ischemic  heart disease (History of MI, History of positive exercuse test, Current chest paint considered due to myocardial ischemia, Use of nitrate therapy, ECG with pathological Q Waves) 0 filed at 10/09/2024 1113   History of congestive heart failure (pulmonary edemia, bilateral rales or S3 gallop, Paroxysmal nocturnal dyspnea, CXR showing pulmonary vascular redistribution) 0 filed at 10/09/2024 1113   History of cerebrovascular disease (Prior TIA or stroke) 0 filed at 10/09/2024 1113   Pre-operative insulin treatment 0 filed at 10/09/2024 1113   Pre-operative creatinine>2 mg/dl 0 filed at 10/09/2024 1113   Revised Cardiac Risk Calculator 0 filed at 10/09/2024 1113          Apfel Simplified Score      [place]sheet Row [place] [facility]   Smoking status 1 filed at 10/09/2024 1113   History of motion sickness or PONV  0 filed at 10/09/2024 1113   Use of postoperative opioids 1 filed at 10/09/2024 1113   Gender - Female 0=No filed at 10/09/2024 1113   Apfel Simplified Score Calculator 2 filed at 10/09/2024 1113          Risk Analysis Index Results This Encounter         10/9/2024  1114             Do you live in a place other than your own home?: 0    When did you begin living in the place you are currently residing?: Greater than one year ago    Any kidney failure, kidney not working well, or seeing a kidney doctor (nephrologist)? If yes, was this for kidney stones or another problem?: 0 No    Any history of chronic (long-term) congestive heart failure (CHF)?: 0 No    Any shortness of breath when resting?: 0 No    In the past five years, have you been diagnosed with or treated for cancer?: No    During the last 3 months has it become difficult for you to remember things or organize your thoughts?: 0 No    Have you lost weight of 10 pounds or more in the past 3 months without trying?: 0 No    Do you have any loss of appetitie?: 0 No    Getting Around (Mobility): 1 Needs help from  cane, walker, or scooter    Eatin Can plan and prepare own meals    Toiletin Can use toilet without any help    Personal Hygiene (Bathing, Hand Washing, Changing Clothes): 0 Can shower or bathe without any help    PAREKH Cancer History: Patient does not indicate history of cancer    Total Risk Analysis Index Score Without Cancer: 24    Total Risk Analysis Index Score: 24          Stop Bang Score      Flowsheet Row Pre-Admission Testing from 10/9/2024 in [facility]   Do you snore loudly? 0 filed at 10/09/2024 1111   Do you often feel tired or fatigued after your sleep? 0 filed at 10/09/2024 1111   Has anyone ever observed you stop breathing in your sleep? 0 filed at 10/09/2024 1111   Do you have or are you being treated for high blood pressure? 1 filed at 10/09/2024 1111   Recent BMI (Calculated) 27.6 filed at 10/09/2024 1111   Is BMI greater than 35 kg/m2? 0=No filed at 10/09/2024 1111   Age older than 50 years old? 1=Yes filed at 10/09/2024 1111   Is your neck circumference greater than 17 inches (Male) or 16 inches (Female)? 0 filed at 10/09/2024 1111   Gender - Male 1=Yes filed at 10/09/2024 1111   STOP-BANG Total Score 3 filed at 10/09/2024 1111            Assessment and Plan:     Pre-Op  69-year-old male scheduled for left total hip replacement-posterior approach on 10/22/2024 Dr. Garcia. Blood work and MRSA ordered- oral chlorahexidine prescribed.  Previous EKG on file from 2024 showing normal sinus rhythm with left axis deviation.  He has been instructed to notify surgeon if any new skin changes occur to surgical limb prior to surgery.  Otherwise no further orders indicated.     Currently at [facility]     Cardiac  Hypertension, compliant with taking lisinopril  Hyperlipidemia  DASI Score: 4.5 4 function related to bilateral avascular necrosis of both hips, using wheelchair  MET Score: 3.3  RCRI 0, 3.9% risk for postoperative MACE    Pulmonary  -Former  smoker  -Preoperative deep breathing educational handout provided to patient.  STOP BANG: 3   points which is a intermediate risk for moderate to severe RICKI    Neuro  -Encephalopathy, recent episode that led to hospitalization last month related to urosepsis  - increased risk for post operative delirium secondary to age >/= 65, decreased functinoal status, ETOH abuse, and type and duration of surgery.  Preoperative brain exercise educational handout provided to patient.  -The patient is at an increased risk for perioperative stroke secondary to increased age, HTN, HLD, and general anesthesia.    Endocrine  -Prediabetes, current treatment-monitored by PCP  Hemoglobin A1C   Date Value Ref Range Status   09/19/2024 6.7 (H) 4.3 - 5.6 % Final     Comment:     American Diabetes Association guidelines indicate that patients with HgbA1c in the range 5.7-6.4% are at increased risk for development of diabetes, and intervention by lifestyle modification may be beneficial. HgbA1c greater or equal to 6.5% is considered diagnostic of diabetes.      GI  Elevated liver enzymes  ETOH abuse, sober for 3 months  Apfel: 2 points 21% risk for post operative N/V    /Renal  Elevated PSA, following urology  Counseled on avoiding NSAIDs, adequate hydration    Musculoskeletal   -Osteoarthritis, bilateral hips  -Avascular necrosis of bones of both hips, reason for surgery    Hematologic  -Anemia, unspecified  - due to high Caprini score of 9 patient is at HIGH risk for perioperative DVT, informative education handout provided    Skin check: Patient was instructed to make surgeon aware of any skin changes/concerns prior to surgery.     Anesthesia:  Patient denies any anesthesia complications.   -he has been recently hospitalized from 9/9 through 9/11/2024 from nursing home with acute encephalopathy, acute renal failure and urosepsis stabilized and discharged with oral antibiotics.  Follow-up appointment with primary care was completed and  documented.     ASA 3: A to review    See risk scores as previously documented.

## 2024-10-09 NOTE — H&P (VIEW-ONLY)
"CPM/PAT Evaluation       Name: Kyle Argueta (Kyle Argueta)  /Age: 1955/69 y.o.     In-Person       Chief Complaint: Preop appointment scheduled left total hip replacement    HPI    JIMMY is a 70 yo male who has longstanding bilateral hip pains with left being worse than right.  Imaging has shown avascular necrosis of both hips.  Treatment options discussed with orthopedic surgeon and it has been decided to pursue a left total hip replacement, with right total hip replacement soon to follow. Presents to [facility] today for perioperative risk stratification and optimization. He endorses having left hip \"excruciating\" pains with limited ROM, using wheelchair mostly being unable to bear much weight on this hip-he is considered nonfunctioning.  Recent bilateral steroid injections both hips were provided in the last month, with no relief. Skin is intact to surgical limb per patient.     Of note, he has been recently hospitalized from  through 2024 from nursing home with acute encephalopathy, acute renal failure and urosepsis stabilized and discharged with oral antibiotics.  Follow-up appointment with primary care was completed and documented.     Past Medical History:   Diagnosis Date    Alcohol abuse     Anemia     Arthritis     Avascular necrosis of bones of both hips (Multi)     Elevated liver enzymes     Hyperlipidemia     Hypertension      PCP: Dr. Morales    Past Surgical History:   Procedure Laterality Date    OTHER SURGICAL HISTORY      ORIF left ankle fracture; repair of tibio-fibular syndesmotic       Patient  reports that he is not currently sexually active.    Family History   Problem Relation Name Age of Onset    Cancer Mother         No Known Allergies    Prior to Admission medications    Medication Sig Start Date End Date Taking? Authorizing Provider   chlorhexidine (Peridex) 0.12 % solution Swish and spit 15 ml for 2 doses, 15mL the night before surgery and 15 ml morning of surgery - " swish for 30 seconds -DO NOT SWALLOW, SPIT OUT 10/9/24   Trenterivictor manuel Salgado APRN-CNP   lisinopril 20 mg tablet Take 1 tablet (20 mg) by mouth once daily.    Historical Provider, MD MARIYA ROSE   Constitutional: Negative for fever, chills, or sweats   ENMT: Negative for nasal discharge, congestion, ear pain, mouth pain, throat pain   Respiratory: Negative for cough, wheezing, shortness of breath   Cardiac: Negative for chest pain, dyspnea on exertion, palpitations   Gastrointestinal: Negative for nausea, vomiting, diarrhea, constipation, abdominal pain  Genitourinary: Negative for dysuria, flank pain, frequency, hematuria     Musculoskeletal: Positive for decreased ROM, pain, swelling, weakness in bilateral hips, left being worse than right-using wheelchair    Neurological: Negative for dizziness, confusion, headache  Psychiatric: Negative for mood changes   Skin: Negative for itching, rash, ulcer    Hematologic/Lymph: Negative for bruising, easy bleeding  Allergic/Immunologic: Negative itching, sneezing, swelling      Physical Exam  Constitutional:       Appearance: Normal appearance.   HENT:      Head: Normocephalic.      Mouth/Throat:      Mouth: Mucous membranes are moist.   Eyes:      Extraocular Movements: Extraocular movements intact.   Cardiovascular:      Rate and Rhythm: Normal rate and regular rhythm.   Pulmonary:      Effort: Pulmonary effort is normal.      Breath sounds: Normal breath sounds.   Abdominal:      General: Abdomen is flat.      Palpations: Abdomen is soft.   Musculoskeletal:      Cervical back: Normal range of motion.      Right hip: Tenderness present. Decreased range of motion. Decreased strength.      Left hip: Tenderness present. Decreased range of motion. Decreased strength.   Skin:     General: Skin is warm and dry.      Coloration: Skin is ashen.   Neurological:      General: No focal deficit present.      Mental Status: He is alert.   Psychiatric:         Mood and Affect: Mood  normal.          PAT AIRWAY:   Airway:     Neck ROM::  Full  normal        Visit Vitals  /62   Pulse 104   Temp 36.9 °C (98.4 °F) (Temporal)   Resp 15       DASI Risk Score      Flowsheet Row Pre-[place] [facility] [place]   Can you take care of yourself (eat, dress, bathe, or use toilet)?  2.75 filed at 10/09/2024 1113   Can you walk indoors, such as around your house? 1.75 filed at 10/09/2024 1113   Can you walk a block or two on level ground?  0 filed at 10/09/2024 1113   Can you climb a flight of stairs or walk up a hill? 0 filed at 10/09/2024 1113   Can you run a short distance? 0 filed at 10/09/2024 1113   Can you do light work around the house like dusting or washing dishes? 0 filed at 10/09/2024 1113   Can you do moderate work around the house like vacuuming, sweeping floors or carrying groceries? 0 filed at 10/09/2024 1113   Can you do heavy work around the house like scrubbing floors or lifting and moving heavy furniture?  0 filed at 10/09/2024 1113   Can you do yard work like raking leaves, weeding or pushing a mower? 0 filed at 10/09/2024 1113   Can you have sexual relations? 0 filed at 10/09/2024 1113   Can you participate in moderate recreational activities like golf, bowling, dancing, doubles tennis or throwing a baseball or football? 0 filed at 10/09/2024 1113   Can you participate in strenous sports like swimming, singles tennis, football, basketball, or skiing? 0 filed at 10/09/2024 1113   DASI SCORE 4.5 filed at 10/09/2024 1113   METS Score (Will be calculated only when all the questions are answered) 3.3 filed at 10/09/2024 1113          Caprini DVT Assessment      Flowsheet Row Pre-Admission Testing from [place]9/[place] [facility] [place] to Hosp-Admission (Discharged) from 9/9/2024 in [facility] Surgical [facility] with Carlos Gomez MD and Gwyn Macias MD   DVT Score 9 filed at 10/09/2024 1105 4 filed at 09/09/2024 1936    Medical Factors Sepsis <1 month filed at 10/09/2024 1105 --   Surgical Factors Elective major lower extremity arthroplasty filed at 10/09/2024 1105 --   BMI 30 or less filed at 10/09/2024 1105 30 or less filed at 09/09/2024 1936   RETIRED: Current Status -- Medical patient at bedrest filed at 09/09/2024 1936   RETIRED: Age -- 60-75 years filed at 09/09/2024 1936          Modified Frailty Index      [place] [facility] [place]   Non-independent functional status (problems with dressing, bathing, personal grooming, or cooking) 0 filed at 10/09/2024 1113   History of diabetes mellitus  0 filed at 10/09/2024 1113   History of COPD 0 filed at 10/09/2024 1113   History of CHF No filed at 10/09/2024 1113   History of MI 0 filed at 10/09/2024 1113   History of Percutaneous Coronary Intervention, Cardiac Surgery, or Angina No filed at 10/09/2024 1113   Hypertension requiring the use of medication  0.0909 filed at 10/09/2024 1113   Peripheral vascular disease 0 filed at 10/09/2024 1113   Impaired sensorium (cognitive impairement or loss, clouding, or delirium) 0 filed at 10/09/2024 1113   TIA or CVA withouy residual deficit 0 filed at 10/09/2024 1113   Cerebrovascular accident with deficit 0 filed at 10/09/2024 1113   Modified Frailty Index Calculator .0909 filed at 10/09/2024 1113          CHADS2 Stroke Risk  Current as of 43 minutes ago        N/A 3 to 100%: High Risk   2 to < 3%: Medium Risk   0 to < 2%: Low Risk     Last Change: N/A          This score determines the patient's risk of having a stroke if the patient has atrial fibrillation.        This score is not applicable to this patient. Components are not calculated.          Revised Cardiac Risk Index      Flowsheet Row [place] [facility] [place]   High-Risk Surgery (Intraperitoneal, Intrathoracic,Suprainguinal vascular) 0 filed at 10/09/2024 1113   History of ischemic  heart disease (History of MI, History of positive exercuse test, Current chest paint considered due to myocardial ischemia, Use of nitrate therapy, ECG with pathological Q Waves) 0 filed at 10/09/2024 1113   History of congestive heart failure (pulmonary edemia, bilateral rales or S3 gallop, Paroxysmal nocturnal dyspnea, CXR showing pulmonary vascular redistribution) 0 filed at 10/09/2024 1113   History of cerebrovascular disease (Prior TIA or stroke) 0 filed at 10/09/2024 1113   Pre-operative insulin treatment 0 filed at 10/09/2024 1113   Pre-operative creatinine>2 mg/dl 0 filed at 10/09/2024 1113   Revised Cardiac Risk Calculator 0 filed at 10/09/2024 1113          Apfel Simplified Score      Flowsheet Row Pre-Admission Testing from 10/9/2024 in [facility] [place]   Smoking status 1 filed at 10/09/2024 1113   History of motion sickness or PONV  0 filed at 10/09/2024 1113   Use of postoperative opioids 1 filed at 10/09/2024 1113   Gender - Female 0=No filed at 10/09/2024 1113   Apfel Simplified Score Calculator 2 filed at 10/09/2024 1113          Risk Analysis Index Results This Encounter         10/9/2024  1114             Do you live in a place other than your own home?: 0    When did you begin living in the place you are currently residing?: Greater than one year ago    Any kidney failure, kidney not working well, or seeing a kidney doctor (nephrologist)? If yes, was this for kidney stones or another problem?: 0 No    Any history of chronic (long-term) congestive heart failure (CHF)?: 0 No    Any shortness of breath when resting?: 0 No    In the past five years, have you been diagnosed with or treated for cancer?: No    During the last 3 months has it become difficult for you to remember things or organize your thoughts?: 0 No    Have you lost weight of 10 pounds or more in the past 3 months without trying?: 0 No    Do you have any loss of appetitie?: 0 No    Getting Around (Mobility): 1 Needs help from  cane, walker, or scooter    Eatin Can plan and prepare own meals    Toiletin Can use toilet without any help    Personal Hygiene (Bathing, Hand Washing, Changing Clothes): 0 Can shower or bathe without any help    PAREKH Cancer History: Patient does not indicate history of cancer    Total Risk Analysis Index Score Without Cancer: 24    Total Risk Analysis Index Score: 24          Stop Bang Score      Flowsheet Row Pre-Admission Testing from 10/9/2024 in [facility] [place]   Do you snore loudly? 0 filed at 10/09/2024 1111   Do you often feel tired or fatigued after your sleep? 0 filed at 10/09/2024 1111   Has anyone ever observed you stop breathing in your sleep? 0 filed at 10/09/2024 1111   Do you have or are you being treated for high blood pressure? 1 filed at 10/09/2024 1111   Recent BMI (Calculated) 27.6 filed at 10/09/2024 1111   Is BMI greater than 35 kg/m2? 0=No filed at 10/09/2024 1111   Age older than 50 years old? 1=Yes filed at 10/09/2024 1111   Is your neck circumference greater than 17 inches (Male) or 16 inches (Female)? 0 filed at 10/09/2024 1111   Gender - Male 1=Yes filed at 10/09/2024 1111   STOP-BANG Total Score 3 filed at 10/09/2024 1111            Assessment and Plan:     Pre-Op  69-year-old male scheduled for left total hip replacement-posterior approach on 10/22/2024 Dr. Garcia. Blood work and MRSA ordered- oral chlorahexidine prescribed.  Previous EKG on file from 2024 showing normal sinus rhythm with left axis deviation.  He has been instructed to notify surgeon if any new skin changes occur to surgical limb prior to surgery.  Otherwise no further orders indicated.     Currently at [facility]     Cardiac  Hypertension, compliant with taking lisinopril  Hyperlipidemia  DASI Score: 4.5 4 function related to bilateral avascular necrosis of both hips, using wheelchair  MET Score: 3.3  RCRI 0, 3.9% risk for postoperative MACE    Pulmonary  -Former  smoker  -Preoperative deep breathing educational handout provided to patient.  STOP BANG: 3   points which is a intermediate risk for moderate to severe RICKI    Neuro  -Encephalopathy, recent episode that led to hospitalization last month related to urosepsis  - increased risk for post operative delirium secondary to age >/= 65, decreased functinoal status, ETOH abuse, and type and duration of surgery.  Preoperative brain exercise educational handout provided to patient.  -The patient is at an increased risk for perioperative stroke secondary to increased age, HTN, HLD, and general anesthesia.    Endocrine  -Prediabetes, current treatment-monitored by PCP  Hemoglobin A1C   Date Value Ref Range Status   09/19/2024 6.7 (H) 4.3 - 5.6 % Final     Comment:     American Diabetes Association guidelines indicate that patients with HgbA1c in the range 5.7-6.4% are at increased risk for development of diabetes, and intervention by lifestyle modification may be beneficial. HgbA1c greater or equal to 6.5% is considered diagnostic of diabetes.      GI  Elevated liver enzymes  ETOH abuse, sober for 3 months  Apfel: 2 points 21% risk for post operative N/V    /Renal  Elevated PSA, following urology  Counseled on avoiding NSAIDs, adequate hydration    Musculoskeletal   -Osteoarthritis, bilateral hips  -Avascular necrosis of bones of both hips, reason for surgery    Hematologic  -Anemia, unspecified  - due to high Caprini score of 9 patient is at HIGH risk for perioperative DVT, informative education handout provided    Skin check: Patient was instructed to make surgeon aware of any skin changes/concerns prior to surgery.     Anesthesia:  Patient denies any anesthesia complications.   -he has been recently hospitalized from 9/9 through 9/11/2024 from nursing home with acute encephalopathy, acute renal failure and urosepsis stabilized and discharged with oral antibiotics.  Follow-up appointment with primary care was completed and  documented.     ASA 3: A to review    See risk scores as previously documented.

## 2024-10-09 NOTE — PREPROCEDURE INSTRUCTIONS
Thank you for visiting Preadmission Testing at [facility]. If you have any changes to your health condition, please call the SURGEON's office to alert them and give them details of your symptoms.        Preoperative Brain Exercises    What are brain exercises?  A brain exercise is any activity that engages your thinking (cognitive) skills.    What types of activities are considered brain exercises?  Jigsaw puzzles, crossword puzzles, word jumble, memory games, word search, and many more.  Many can be found free online or on your phone via a mobile abimbola.    Why should I do brain exercises before my surgery?  More recent research has shown brain exercise before surgery can lower the risk of postoperative delirium (confusion) which can be especially important for older adults.  Patients who did brain exercises for 5 to 10 hours the days before surgery, cut their risk of postoperative delirium in half up to 1 week after surgery.      Preoperative Deep Breathing Exercises    Why it is important to do deep breathing exercises before my surgery?  Deep breathing exercises strengthen your breathing muscles.  This helps you to recover after your surgery and decreases the chance of breathing complications.    How are the deep breathing exercises done?  Sit straight with your back supported.  Breathe in deeply and slowly through your nose. Your lower rib cage should expand and your abdomen may move forward.  Hold that breath for 3 to 5 seconds.  Breathe out through pursed lips, slowly and completely.  Rest and repeat 10 times every hour while awake.  Rest longer if you become dizzy or lightheaded.      Patient and Family Education   Ways You Can Help Prevent Blood Clots     This handout explains some simple things you can do to help prevent blood clots.      Blood clots are blockages that can form in the body's veins. When a blood clot forms in your deep veins, it may be called a deep vein thrombosis, or DVT for short. Blood clots can  happen in any part of the body where blood flows, but they are most common in the arms and legs. If a piece of a blood clot breaks free and travels to the lungs, it is called a pulmonary embolus (PE). A PE can be a very serious problem.      Being in the hospital or having surgery can raise your chances of getting a blood clot because you may not be well enough to move around as much as you normally do.      Ways you can help prevent blood clots in the hospital         Wearing SCDs. SCDs stands for Sequential Compression Devices.   SCDs are special sleeves that wrap around your legs  They attach to a pump that fills them with air to gently squeeze your legs every few minutes.   This helps return the blood in your legs to your heart.   SCDs should only be taken off when walking or bathing.   SCDs may not be comfortable, but they can help save your life.               Wearing compression stockings - if your doctor orders them. These special snug fitting stockings gently squeeze your legs to help blood flow.       Walking. Walking helps move the blood in your legs.   If your doctor says it is ok, try walking the halls at least   5 times a day. Ask us to help you get up, so you don't fall.      Taking any blood thinning medicines your doctor orders.          ©[facility]; 3/23        Ways you can help prevent blood clots at home       Wearing compression stockings - if your doctor orders them. ? Walking - to help move the blood in your legs.       Taking any blood thinning medicines your doctor orders.      Signs of a blood clot or PE      Tell your doctor or nurse know right away if you have of the problems listed below.    If you are at home, seek medical care right away. Call 911 for chest pain or problems breathing.          Signs of a blood clot (DVT) - such as pain,  swelling, redness or warmth in your arm or leg      Signs of a pulmonary embolism (PE) - such as chest     pain or feeling short of breath

## 2024-10-09 NOTE — PREPROCEDURE INSTRUCTIONS
Medication List            Accurate as of October 9, 2024 11:29 AM. Always use your most recent med list.                chlorhexidine 0.12 % solution  Commonly known as: Peridex  Swish and spit 15 ml for 2 doses, 15mL the night before surgery and 15 ml morning of surgery - swish for 30 seconds -DO NOT SWALLOW, SPIT OUT  Medication Adjustments for Surgery: Take/Use as prescribed  Notes to patient: STOP all NSAIDS, over the counter medications, herbals and supplements for 7 days prior to surgery    Tylenol is okay up until morning of surgery     lisinopril 20 mg tablet  Additional Medication Adjustments for Surgery: Other (Comment)  Notes to patient: HOLD any evening dose the night before the day of surgery  HOLD the day of surgery    Last dose will be morning of 10/21                                 PRE-OPERATIVE INSTRUCTIONS    You will receive notification one business day prior to your procedure to confirm your arrival time. It is important that you answer your phone and/or check your messages during this time. If you do not hear from the surgery center by 5 pm. the day before your procedure, please call [phone].     Please enter the building through the Outpatient entrance and take the elevator off the lobby to the 2nd floor then check in at the Outpatient Surgery desk on the 2nd floor.    INSTRUCTIONS:  Talk to your surgeon for instructions if you should stop your aspirin, blood thinner, or diabetes medicines.  DO NOT take any multivitamins or over the counter supplements for 7-10 days before surgery.  If not being admitted, you must have an adult immediately available to drive you home after surgery. We also highly recommend you have someone stay with you for the entire day and night of your surgery.  For children having surgery, a parent or legal guardian must accompany them to the surgery center. If this is not possible, please call [phone] to make additional arrangements.  For adults who are  unable to consent or make medical decisions for themselves, a legal guardian or Power of  must accompany them to the surgery center. If this is not possible, please call [phone] to make additional arrangements.  Wear comfortable, loose fitting clothing.  All jewelry and piercings must be removed. If you are unable to remove an item or have a dermal piercing, please be sure to tell the nurse when you arrive for surgery.  Nail polish and make-up must be removed.  Avoid smoking or consuming alcohol for 24 hours before surgery.  To help prevent infection, please take a shower/bath and wash your hair the night before and/or morning of surgery (or follow other specific bathing instructions provided).    Preoperative Fasting Guidelines    Why must I stop eating and drinking near surgery time?  With sedation, food or liquid in your stomach can enter your lungs causing serious complications  Increases nausea and vomiting    When do I need to stop eating and drinking before my surgery?  Do not eat any solid food after midnight the night before your surgery/procedure unless otherwise instructed by your surgeon.   You may have up to 13.5 ounces of clear liquid until TWO hours before your instructed arrival time to the hospital.  This includes water, black tea/coffee, (no milk or cream) apple juice, and electrolyte drinks (Gatorade).   You may chew gum until TWO hours before your surgery/procedure      If applicable, notify your surgeons office immediately of any new skin changes that occur to the surgical limb.      If you have any questions or concerns, please call Pre-Admission Testing at [phone].

## 2024-10-11 LAB — STAPHYLOCOCCUS SPEC CULT: NORMAL

## 2024-10-22 ENCOUNTER — HOSPITAL ENCOUNTER (OUTPATIENT)
Facility: HOSPITAL | Age: 69
Discharge: HOME HEALTH | End: 2024-10-24
Attending: ORTHOPAEDIC SURGERY | Admitting: ORTHOPAEDIC SURGERY
Payer: MEDICARE

## 2024-10-22 ENCOUNTER — ANESTHESIA EVENT (OUTPATIENT)
Dept: OPERATING ROOM | Facility: HOSPITAL | Age: 69
End: 2024-10-22
Payer: MEDICARE

## 2024-10-22 ENCOUNTER — APPOINTMENT (OUTPATIENT)
Dept: RADIOLOGY | Facility: HOSPITAL | Age: 69
End: 2024-10-22
Payer: MEDICARE

## 2024-10-22 ENCOUNTER — ANESTHESIA (OUTPATIENT)
Dept: OPERATING ROOM | Facility: HOSPITAL | Age: 69
End: 2024-10-22
Payer: MEDICARE

## 2024-10-22 DIAGNOSIS — Z96.642 STATUS POST LEFT HIP REPLACEMENT: Primary | ICD-10-CM

## 2024-10-22 DIAGNOSIS — G89.18 ACUTE POSTOPERATIVE PAIN: ICD-10-CM

## 2024-10-22 PROBLEM — M87.852 OTHER OSTEONECROSIS, LEFT FEMUR (MULTI): Chronic | Status: ACTIVE | Noted: 2024-10-22

## 2024-10-22 PROBLEM — I10 HYPERTENSION: Chronic | Status: ACTIVE | Noted: 2024-10-22

## 2024-10-22 LAB
ABO GROUP (TYPE) IN BLOOD: NORMAL
ANTIBODY SCREEN: NORMAL
RH FACTOR (ANTIGEN D): NORMAL

## 2024-10-22 PROCEDURE — 97161 PT EVAL LOW COMPLEX 20 MIN: CPT | Mod: GP | Performed by: PHYSICAL THERAPIST

## 2024-10-22 PROCEDURE — 7100000001 HC RECOVERY ROOM TIME - INITIAL BASE CHARGE: Performed by: ORTHOPAEDIC SURGERY

## 2024-10-22 PROCEDURE — 2500000004 HC RX 250 GENERAL PHARMACY W/ HCPCS (ALT 636 FOR OP/ED): Mod: JZ | Performed by: ORTHOPAEDIC SURGERY

## 2024-10-22 PROCEDURE — 7100000002 HC RECOVERY ROOM TIME - EACH INCREMENTAL 1 MINUTE: Performed by: ORTHOPAEDIC SURGERY

## 2024-10-22 PROCEDURE — 72170 X-RAY EXAM OF PELVIS: CPT | Performed by: RADIOLOGY

## 2024-10-22 PROCEDURE — 36415 COLL VENOUS BLD VENIPUNCTURE: CPT | Performed by: ORTHOPAEDIC SURGERY

## 2024-10-22 PROCEDURE — 86920 COMPATIBILITY TEST SPIN: CPT

## 2024-10-22 PROCEDURE — 97116 GAIT TRAINING THERAPY: CPT | Mod: GP | Performed by: PHYSICAL THERAPIST

## 2024-10-22 PROCEDURE — 3600000017 HC OR TIME - EACH INCREMENTAL 1 MINUTE - PROCEDURE LEVEL SIX: Performed by: ORTHOPAEDIC SURGERY

## 2024-10-22 PROCEDURE — 2500000004 HC RX 250 GENERAL PHARMACY W/ HCPCS (ALT 636 FOR OP/ED): Performed by: ANESTHESIOLOGY

## 2024-10-22 PROCEDURE — A27130 PR TOTAL HIP ARTHROPLASTY: Performed by: ANESTHESIOLOGY

## 2024-10-22 PROCEDURE — A27130 PR TOTAL HIP ARTHROPLASTY: Performed by: ANESTHESIOLOGIST ASSISTANT

## 2024-10-22 PROCEDURE — 3700000001 HC GENERAL ANESTHESIA TIME - INITIAL BASE CHARGE: Performed by: ORTHOPAEDIC SURGERY

## 2024-10-22 PROCEDURE — 2500000004 HC RX 250 GENERAL PHARMACY W/ HCPCS (ALT 636 FOR OP/ED): Performed by: ANESTHESIOLOGIST ASSISTANT

## 2024-10-22 PROCEDURE — 2780000003 HC OR 278 NO HCPCS: Performed by: ORTHOPAEDIC SURGERY

## 2024-10-22 PROCEDURE — 72170 X-RAY EXAM OF PELVIS: CPT

## 2024-10-22 PROCEDURE — 2720000007 HC OR 272 NO HCPCS: Performed by: ORTHOPAEDIC SURGERY

## 2024-10-22 PROCEDURE — 2500000001 HC RX 250 WO HCPCS SELF ADMINISTERED DRUGS (ALT 637 FOR MEDICARE OP): Performed by: ORTHOPAEDIC SURGERY

## 2024-10-22 PROCEDURE — 3700000002 HC GENERAL ANESTHESIA TIME - EACH INCREMENTAL 1 MINUTE: Performed by: ORTHOPAEDIC SURGERY

## 2024-10-22 PROCEDURE — A9999 DME SUPPLY OR ACCESSORY, NOS: HCPCS | Mod: MUE | Performed by: ORTHOPAEDIC SURGERY

## 2024-10-22 PROCEDURE — 2500000005 HC RX 250 GENERAL PHARMACY W/O HCPCS: Performed by: ANESTHESIOLOGY

## 2024-10-22 PROCEDURE — A6213 FOAM DRG >16<=48 SQ IN W/BDR: HCPCS | Performed by: ORTHOPAEDIC SURGERY

## 2024-10-22 PROCEDURE — 2500000005 HC RX 250 GENERAL PHARMACY W/O HCPCS: Performed by: ORTHOPAEDIC SURGERY

## 2024-10-22 PROCEDURE — 2500000005 HC RX 250 GENERAL PHARMACY W/O HCPCS: Performed by: ANESTHESIOLOGIST ASSISTANT

## 2024-10-22 PROCEDURE — 7100000011 HC EXTENDED STAY RECOVERY HOURLY - NURSING UNIT

## 2024-10-22 PROCEDURE — 2500000002 HC RX 250 W HCPCS SELF ADMINISTERED DRUGS (ALT 637 FOR MEDICARE OP, ALT 636 FOR OP/ED): Performed by: ORTHOPAEDIC SURGERY

## 2024-10-22 PROCEDURE — 3600000018 HC OR TIME - INITIAL BASE CHARGE - PROCEDURE LEVEL SIX: Performed by: ORTHOPAEDIC SURGERY

## 2024-10-22 PROCEDURE — C1713 ANCHOR/SCREW BN/BN,TIS/BN: HCPCS | Performed by: ORTHOPAEDIC SURGERY

## 2024-10-22 PROCEDURE — 86900 BLOOD TYPING SEROLOGIC ABO: CPT | Performed by: ORTHOPAEDIC SURGERY

## 2024-10-22 PROCEDURE — C1776 JOINT DEVICE (IMPLANTABLE): HCPCS | Performed by: ORTHOPAEDIC SURGERY

## 2024-10-22 DEVICE — IMPLANTABLE DEVICE: Type: IMPLANTABLE DEVICE | Site: HIP | Status: FUNCTIONAL

## 2024-10-22 RX ORDER — ALBUTEROL SULFATE 0.83 MG/ML
2.5 SOLUTION RESPIRATORY (INHALATION)
Status: DISCONTINUED | OUTPATIENT
Start: 2024-10-22 | End: 2024-10-22 | Stop reason: HOSPADM

## 2024-10-22 RX ORDER — PANTOPRAZOLE SODIUM 20 MG/1
20 TABLET, DELAYED RELEASE ORAL
Status: DISCONTINUED | OUTPATIENT
Start: 2024-10-23 | End: 2024-10-24 | Stop reason: HOSPADM

## 2024-10-22 RX ORDER — PHENYLEPHRINE HCL IN 0.9% NACL 1 MG/10 ML
SYRINGE (ML) INTRAVENOUS AS NEEDED
Status: DISCONTINUED | OUTPATIENT
Start: 2024-10-22 | End: 2024-10-22

## 2024-10-22 RX ORDER — HYDROMORPHONE HYDROCHLORIDE 1 MG/ML
INJECTION, SOLUTION INTRAMUSCULAR; INTRAVENOUS; SUBCUTANEOUS AS NEEDED
Status: DISCONTINUED | OUTPATIENT
Start: 2024-10-22 | End: 2024-10-22

## 2024-10-22 RX ORDER — ROCURONIUM BROMIDE 50 MG/5 ML
SYRINGE (ML) INTRAVENOUS AS NEEDED
Status: DISCONTINUED | OUTPATIENT
Start: 2024-10-22 | End: 2024-10-22

## 2024-10-22 RX ORDER — OXYCODONE HYDROCHLORIDE 10 MG/1
10 TABLET ORAL EVERY 4 HOURS PRN
Status: DISCONTINUED | OUTPATIENT
Start: 2024-10-22 | End: 2024-10-24 | Stop reason: HOSPADM

## 2024-10-22 RX ORDER — DIPHENHYDRAMINE HYDROCHLORIDE 50 MG/ML
12.5 INJECTION INTRAMUSCULAR; INTRAVENOUS EVERY 6 HOURS PRN
Status: DISCONTINUED | OUTPATIENT
Start: 2024-10-22 | End: 2024-10-24 | Stop reason: HOSPADM

## 2024-10-22 RX ORDER — GLYCOPYRROLATE 0.2 MG/ML
INJECTION INTRAMUSCULAR; INTRAVENOUS AS NEEDED
Status: DISCONTINUED | OUTPATIENT
Start: 2024-10-22 | End: 2024-10-22

## 2024-10-22 RX ORDER — NALOXONE HYDROCHLORIDE 0.4 MG/ML
0.2 INJECTION, SOLUTION INTRAMUSCULAR; INTRAVENOUS; SUBCUTANEOUS EVERY 5 MIN PRN
Status: DISCONTINUED | OUTPATIENT
Start: 2024-10-22 | End: 2024-10-24 | Stop reason: HOSPADM

## 2024-10-22 RX ORDER — CEFAZOLIN SODIUM 2 G/100ML
2 INJECTION, SOLUTION INTRAVENOUS ONCE
Status: COMPLETED | OUTPATIENT
Start: 2024-10-22 | End: 2024-10-22

## 2024-10-22 RX ORDER — HYDROMORPHONE HYDROCHLORIDE 1 MG/ML
1 INJECTION, SOLUTION INTRAMUSCULAR; INTRAVENOUS; SUBCUTANEOUS EVERY 5 MIN PRN
Status: DISCONTINUED | OUTPATIENT
Start: 2024-10-22 | End: 2024-10-22 | Stop reason: HOSPADM

## 2024-10-22 RX ORDER — AMOXICILLIN 250 MG
1 CAPSULE ORAL DAILY
Status: DISCONTINUED | OUTPATIENT
Start: 2024-10-23 | End: 2024-10-24 | Stop reason: HOSPADM

## 2024-10-22 RX ORDER — LABETALOL HYDROCHLORIDE 5 MG/ML
5 INJECTION, SOLUTION INTRAVENOUS
Status: DISCONTINUED | OUTPATIENT
Start: 2024-10-22 | End: 2024-10-22 | Stop reason: HOSPADM

## 2024-10-22 RX ORDER — METOCLOPRAMIDE HYDROCHLORIDE 5 MG/ML
10 INJECTION INTRAMUSCULAR; INTRAVENOUS ONCE AS NEEDED
Status: DISCONTINUED | OUTPATIENT
Start: 2024-10-22 | End: 2024-10-22 | Stop reason: HOSPADM

## 2024-10-22 RX ORDER — FENTANYL CITRATE 50 UG/ML
INJECTION, SOLUTION INTRAMUSCULAR; INTRAVENOUS AS NEEDED
Status: DISCONTINUED | OUTPATIENT
Start: 2024-10-22 | End: 2024-10-22

## 2024-10-22 RX ORDER — ONDANSETRON HYDROCHLORIDE 2 MG/ML
INJECTION, SOLUTION INTRAVENOUS AS NEEDED
Status: DISCONTINUED | OUTPATIENT
Start: 2024-10-22 | End: 2024-10-22

## 2024-10-22 RX ORDER — HYDROCODONE BITARTRATE AND ACETAMINOPHEN 5; 325 MG/1; MG/1
1 TABLET ORAL EVERY 4 HOURS PRN
Status: DISCONTINUED | OUTPATIENT
Start: 2024-10-22 | End: 2024-10-22 | Stop reason: HOSPADM

## 2024-10-22 RX ORDER — SODIUM CHLORIDE, SODIUM LACTATE, POTASSIUM CHLORIDE, CALCIUM CHLORIDE 600; 310; 30; 20 MG/100ML; MG/100ML; MG/100ML; MG/100ML
100 INJECTION, SOLUTION INTRAVENOUS CONTINUOUS
Status: DISCONTINUED | OUTPATIENT
Start: 2024-10-22 | End: 2024-10-23

## 2024-10-22 RX ORDER — HYDRALAZINE HYDROCHLORIDE 20 MG/ML
5 INJECTION INTRAMUSCULAR; INTRAVENOUS EVERY 30 MIN PRN
Status: DISCONTINUED | OUTPATIENT
Start: 2024-10-22 | End: 2024-10-22 | Stop reason: HOSPADM

## 2024-10-22 RX ORDER — ACETAMINOPHEN 325 MG/1
975 TABLET ORAL ONCE
Status: COMPLETED | OUTPATIENT
Start: 2024-10-22 | End: 2024-10-22

## 2024-10-22 RX ORDER — LIDOCAINE HYDROCHLORIDE 20 MG/ML
INJECTION, SOLUTION EPIDURAL; INFILTRATION; INTRACAUDAL; PERINEURAL AS NEEDED
Status: DISCONTINUED | OUTPATIENT
Start: 2024-10-22 | End: 2024-10-22

## 2024-10-22 RX ORDER — MIDAZOLAM HYDROCHLORIDE 1 MG/ML
1 INJECTION, SOLUTION INTRAMUSCULAR; INTRAVENOUS ONCE AS NEEDED
Status: DISCONTINUED | OUTPATIENT
Start: 2024-10-22 | End: 2024-10-22 | Stop reason: HOSPADM

## 2024-10-22 RX ORDER — CELECOXIB 200 MG/1
200 CAPSULE ORAL DAILY
Status: DISCONTINUED | OUTPATIENT
Start: 2024-10-23 | End: 2024-10-23

## 2024-10-22 RX ORDER — PROPOFOL 10 MG/ML
INJECTION, EMULSION INTRAVENOUS AS NEEDED
Status: DISCONTINUED | OUTPATIENT
Start: 2024-10-22 | End: 2024-10-22

## 2024-10-22 RX ORDER — CEFAZOLIN SODIUM 2 G/100ML
2 INJECTION, SOLUTION INTRAVENOUS EVERY 8 HOURS
Status: COMPLETED | OUTPATIENT
Start: 2024-10-22 | End: 2024-10-23

## 2024-10-22 RX ORDER — MIDAZOLAM HYDROCHLORIDE 1 MG/ML
INJECTION, SOLUTION INTRAMUSCULAR; INTRAVENOUS AS NEEDED
Status: DISCONTINUED | OUTPATIENT
Start: 2024-10-22 | End: 2024-10-22

## 2024-10-22 RX ORDER — OXYCODONE HYDROCHLORIDE 5 MG/1
5 TABLET ORAL EVERY 4 HOURS PRN
Status: DISCONTINUED | OUTPATIENT
Start: 2024-10-22 | End: 2024-10-24 | Stop reason: HOSPADM

## 2024-10-22 RX ORDER — DIPHENHYDRAMINE HYDROCHLORIDE 50 MG/ML
12.5 INJECTION INTRAMUSCULAR; INTRAVENOUS ONCE AS NEEDED
Status: DISCONTINUED | OUTPATIENT
Start: 2024-10-22 | End: 2024-10-22 | Stop reason: HOSPADM

## 2024-10-22 RX ORDER — ASPIRIN 81 MG/1
81 TABLET ORAL 2 TIMES DAILY
Status: DISCONTINUED | OUTPATIENT
Start: 2024-10-22 | End: 2024-10-24 | Stop reason: HOSPADM

## 2024-10-22 RX ORDER — NEOSTIGMINE METHYLSULFATE 1 MG/ML
INJECTION INTRAVENOUS AS NEEDED
Status: DISCONTINUED | OUTPATIENT
Start: 2024-10-22 | End: 2024-10-22

## 2024-10-22 RX ORDER — ADHESIVE BANDAGE
30 BANDAGE TOPICAL DAILY PRN
Status: DISCONTINUED | OUTPATIENT
Start: 2024-10-22 | End: 2024-10-24 | Stop reason: HOSPADM

## 2024-10-22 RX ORDER — LISINOPRIL 20 MG/1
20 TABLET ORAL DAILY
Status: DISCONTINUED | OUTPATIENT
Start: 2024-10-23 | End: 2024-10-24 | Stop reason: HOSPADM

## 2024-10-22 RX ORDER — MORPHINE SULFATE 2 MG/ML
1 INJECTION, SOLUTION INTRAMUSCULAR; INTRAVENOUS EVERY 2 HOUR PRN
Status: DISCONTINUED | OUTPATIENT
Start: 2024-10-22 | End: 2024-10-24 | Stop reason: HOSPADM

## 2024-10-22 RX ORDER — ACETAMINOPHEN 325 MG/1
975 TABLET ORAL EVERY 8 HOURS SCHEDULED
Status: DISCONTINUED | OUTPATIENT
Start: 2024-10-22 | End: 2024-10-24 | Stop reason: HOSPADM

## 2024-10-22 RX ORDER — TRANEXAMIC ACID 650 MG/1
1300 TABLET ORAL ONCE
Status: COMPLETED | OUTPATIENT
Start: 2024-10-22 | End: 2024-10-22

## 2024-10-22 RX ORDER — DEXAMETHASONE SODIUM PHOSPHATE 10 MG/ML
INJECTION INTRAMUSCULAR; INTRAVENOUS AS NEEDED
Status: DISCONTINUED | OUTPATIENT
Start: 2024-10-22 | End: 2024-10-22

## 2024-10-22 RX ORDER — OXYCODONE HYDROCHLORIDE 5 MG/1
2.5 TABLET ORAL EVERY 4 HOURS PRN
Status: DISCONTINUED | OUTPATIENT
Start: 2024-10-22 | End: 2024-10-24 | Stop reason: HOSPADM

## 2024-10-22 RX ORDER — SODIUM CHLORIDE, SODIUM LACTATE, POTASSIUM CHLORIDE, CALCIUM CHLORIDE 600; 310; 30; 20 MG/100ML; MG/100ML; MG/100ML; MG/100ML
50 INJECTION, SOLUTION INTRAVENOUS CONTINUOUS
Status: DISCONTINUED | OUTPATIENT
Start: 2024-10-22 | End: 2024-10-23

## 2024-10-22 SDOH — SOCIAL STABILITY: SOCIAL INSECURITY: HAS ANYONE EVER THREATENED TO HURT YOUR FAMILY OR YOUR PETS?: NO

## 2024-10-22 SDOH — ECONOMIC STABILITY: HOUSING INSECURITY: IN THE PAST 12 MONTHS, HOW MANY TIMES HAVE YOU MOVED WHERE YOU WERE LIVING?: 0

## 2024-10-22 SDOH — ECONOMIC STABILITY: FOOD INSECURITY: WITHIN THE PAST 12 MONTHS, THE FOOD YOU BOUGHT JUST DIDN'T LAST AND YOU DIDN'T HAVE MONEY TO GET MORE.: PATIENT DECLINED

## 2024-10-22 SDOH — SOCIAL STABILITY: SOCIAL INSECURITY: ARE THERE ANY APPARENT SIGNS OF INJURIES/BEHAVIORS THAT COULD BE RELATED TO ABUSE/NEGLECT?: NO

## 2024-10-22 SDOH — SOCIAL STABILITY: SOCIAL INSECURITY: DO YOU FEEL ANYONE HAS EXPLOITED OR TAKEN ADVANTAGE OF YOU FINANCIALLY OR OF YOUR PERSONAL PROPERTY?: NO

## 2024-10-22 SDOH — SOCIAL STABILITY: SOCIAL INSECURITY: ABUSE: ADULT

## 2024-10-22 SDOH — ECONOMIC STABILITY: INCOME INSECURITY
IN THE PAST 12 MONTHS HAS THE ELECTRIC, GAS, OIL, OR WATER COMPANY THREATENED TO SHUT OFF SERVICES IN YOUR HOME?: PATIENT DECLINED

## 2024-10-22 SDOH — ECONOMIC STABILITY: TRANSPORTATION INSECURITY
IN THE PAST 12 MONTHS, HAS LACK OF TRANSPORTATION KEPT YOU FROM MEDICAL APPOINTMENTS OR FROM GETTING MEDICATIONS?: PATIENT DECLINED

## 2024-10-22 SDOH — SOCIAL STABILITY: SOCIAL INSECURITY: HAVE YOU HAD ANY THOUGHTS OF HARMING ANYONE ELSE?: NO

## 2024-10-22 SDOH — SOCIAL STABILITY: SOCIAL INSECURITY: DO YOU FEEL UNSAFE GOING BACK TO THE PLACE WHERE YOU ARE LIVING?: NO

## 2024-10-22 SDOH — SOCIAL STABILITY: SOCIAL INSECURITY: WITHIN THE LAST YEAR, HAVE YOU BEEN AFRAID OF YOUR PARTNER OR EX-PARTNER?: PATIENT DECLINED

## 2024-10-22 SDOH — SOCIAL STABILITY: SOCIAL INSECURITY: ARE YOU OR HAVE YOU BEEN THREATENED OR ABUSED PHYSICALLY, EMOTIONALLY, OR SEXUALLY BY ANYONE?: NO

## 2024-10-22 SDOH — SOCIAL STABILITY: SOCIAL INSECURITY
WITHIN THE LAST YEAR, HAVE YOU BEEN KICKED, HIT, SLAPPED, OR OTHERWISE PHYSICALLY HURT BY YOUR PARTNER OR EX-PARTNER?: PATIENT DECLINED

## 2024-10-22 SDOH — SOCIAL STABILITY: SOCIAL INSECURITY: HAVE YOU HAD THOUGHTS OF HARMING ANYONE ELSE?: NO

## 2024-10-22 SDOH — ECONOMIC STABILITY: HOUSING INSECURITY: AT ANY TIME IN THE PAST 12 MONTHS, WERE YOU HOMELESS OR LIVING IN A SHELTER (INCLUDING NOW)?: PATIENT DECLINED

## 2024-10-22 SDOH — SOCIAL STABILITY: SOCIAL INSECURITY
WITHIN THE LAST YEAR, HAVE YOU BEEN HUMILIATED OR EMOTIONALLY ABUSED IN OTHER WAYS BY YOUR PARTNER OR EX-PARTNER?: PATIENT DECLINED

## 2024-10-22 SDOH — ECONOMIC STABILITY: FOOD INSECURITY: HOW HARD IS IT FOR YOU TO PAY FOR THE VERY BASICS LIKE FOOD, HOUSING, MEDICAL CARE, AND HEATING?: PATIENT DECLINED

## 2024-10-22 SDOH — HEALTH STABILITY: MENTAL HEALTH: CURRENT SMOKER: 0

## 2024-10-22 SDOH — ECONOMIC STABILITY: HOUSING INSECURITY: IN THE LAST 12 MONTHS, WAS THERE A TIME WHEN YOU WERE NOT ABLE TO PAY THE MORTGAGE OR RENT ON TIME?: PATIENT DECLINED

## 2024-10-22 SDOH — SOCIAL STABILITY: SOCIAL INSECURITY: DOES ANYONE TRY TO KEEP YOU FROM HAVING/CONTACTING OTHER FRIENDS OR DOING THINGS OUTSIDE YOUR HOME?: NO

## 2024-10-22 SDOH — ECONOMIC STABILITY: FOOD INSECURITY
WITHIN THE PAST 12 MONTHS, YOU WORRIED THAT YOUR FOOD WOULD RUN OUT BEFORE YOU GOT THE MONEY TO BUY MORE.: PATIENT DECLINED

## 2024-10-22 SDOH — SOCIAL STABILITY: SOCIAL INSECURITY: WERE YOU ABLE TO COMPLETE ALL THE BEHAVIORAL HEALTH SCREENINGS?: YES

## 2024-10-22 SDOH — SOCIAL STABILITY: SOCIAL INSECURITY
WITHIN THE LAST YEAR, HAVE YOU BEEN RAPED OR FORCED TO HAVE ANY KIND OF SEXUAL ACTIVITY BY YOUR PARTNER OR EX-PARTNER?: PATIENT DECLINED

## 2024-10-22 ASSESSMENT — LIFESTYLE VARIABLES
AUDIT-C TOTAL SCORE: 0
HOW MANY STANDARD DRINKS CONTAINING ALCOHOL DO YOU HAVE ON A TYPICAL DAY: PATIENT DOES NOT DRINK
AUDIT-C TOTAL SCORE: 0
HOW OFTEN DO YOU HAVE 6 OR MORE DRINKS ON ONE OCCASION: NEVER
HOW OFTEN DO YOU HAVE A DRINK CONTAINING ALCOHOL: NEVER
SKIP TO QUESTIONS 9-10: 1

## 2024-10-22 ASSESSMENT — COGNITIVE AND FUNCTIONAL STATUS - GENERAL
PATIENT BASELINE BEDBOUND: NO
CLIMB 3 TO 5 STEPS WITH RAILING: A LOT
MOVING TO AND FROM BED TO CHAIR: A LITTLE
MOBILITY SCORE: 15
PERSONAL GROOMING: A LITTLE
DRESSING REGULAR UPPER BODY CLOTHING: A LITTLE
MOVING FROM LYING ON BACK TO SITTING ON SIDE OF FLAT BED WITH BEDRAILS: A LITTLE
CLIMB 3 TO 5 STEPS WITH RAILING: A LOT
TURNING FROM BACK TO SIDE WHILE IN FLAT BAD: A LITTLE
MOVING TO AND FROM BED TO CHAIR: A LITTLE
HELP NEEDED FOR BATHING: A LITTLE
TURNING FROM BACK TO SIDE WHILE IN FLAT BAD: A LITTLE
MOBILITY SCORE: 17
WALKING IN HOSPITAL ROOM: A LOT
STANDING UP FROM CHAIR USING ARMS: A LITTLE
MOVING TO AND FROM BED TO CHAIR: A LITTLE
CLIMB 3 TO 5 STEPS WITH RAILING: TOTAL
MOVING FROM LYING ON BACK TO SITTING ON SIDE OF FLAT BED WITH BEDRAILS: A LITTLE
WALKING IN HOSPITAL ROOM: A LOT
TURNING FROM BACK TO SIDE WHILE IN FLAT BAD: A LITTLE
STANDING UP FROM CHAIR USING ARMS: A LITTLE
DAILY ACTIVITIY SCORE: 20
MOVING FROM LYING ON BACK TO SITTING ON SIDE OF FLAT BED WITH BEDRAILS: A LITTLE
TOILETING: A LITTLE
WALKING IN HOSPITAL ROOM: A LITTLE
MOBILITY SCORE: 16
DAILY ACTIVITIY SCORE: 24
STANDING UP FROM CHAIR USING ARMS: A LITTLE

## 2024-10-22 ASSESSMENT — PAIN SCALES - GENERAL
PAINLEVEL_OUTOF10: 6
PAINLEVEL_OUTOF10: 0 - NO PAIN
PAINLEVEL_OUTOF10: 6
PAINLEVEL_OUTOF10: 5 - MODERATE PAIN
PAINLEVEL_OUTOF10: 1
PAINLEVEL_OUTOF10: 5 - MODERATE PAIN
PAINLEVEL_OUTOF10: 9
PAINLEVEL_OUTOF10: 9
PAINLEVEL_OUTOF10: 0 - NO PAIN
PAINLEVEL_OUTOF10: 5 - MODERATE PAIN

## 2024-10-22 ASSESSMENT — COLUMBIA-SUICIDE SEVERITY RATING SCALE - C-SSRS
6. HAVE YOU EVER DONE ANYTHING, STARTED TO DO ANYTHING, OR PREPARED TO DO ANYTHING TO END YOUR LIFE?: NO
1. IN THE PAST MONTH, HAVE YOU WISHED YOU WERE DEAD OR WISHED YOU COULD GO TO SLEEP AND NOT WAKE UP?: NO
2. HAVE YOU ACTUALLY HAD ANY THOUGHTS OF KILLING YOURSELF?: NO

## 2024-10-22 ASSESSMENT — PAIN - FUNCTIONAL ASSESSMENT
PAIN_FUNCTIONAL_ASSESSMENT: 0-10

## 2024-10-22 ASSESSMENT — ACTIVITIES OF DAILY LIVING (ADL)
BATHING: INDEPENDENT
HEARING - LEFT EAR: FUNCTIONAL
PATIENT'S MEMORY ADEQUATE TO SAFELY COMPLETE DAILY ACTIVITIES?: YES
HEARING - RIGHT EAR: FUNCTIONAL
GROOMING: INDEPENDENT
WALKS IN HOME: INDEPENDENT
TOILETING: INDEPENDENT
FEEDING YOURSELF: INDEPENDENT
ASSISTIVE_DEVICE: WHEELCHAIR
DRESSING YOURSELF: INDEPENDENT
ADEQUATE_TO_COMPLETE_ADL: YES
JUDGMENT_ADEQUATE_SAFELY_COMPLETE_DAILY_ACTIVITIES: YES
LACK_OF_TRANSPORTATION: PATIENT DECLINED
LACK_OF_TRANSPORTATION: PATIENT DECLINED

## 2024-10-22 ASSESSMENT — PAIN DESCRIPTION - LOCATION
LOCATION: HIP
LOCATION: HIP

## 2024-10-22 ASSESSMENT — PATIENT HEALTH QUESTIONNAIRE - PHQ9
2. FEELING DOWN, DEPRESSED OR HOPELESS: NOT AT ALL
SUM OF ALL RESPONSES TO PHQ9 QUESTIONS 1 & 2: 0
1. LITTLE INTEREST OR PLEASURE IN DOING THINGS: NOT AT ALL

## 2024-10-22 ASSESSMENT — PAIN DESCRIPTION - ORIENTATION
ORIENTATION: LEFT
ORIENTATION: LEFT

## 2024-10-22 ASSESSMENT — PAIN SCALES - PAIN ASSESSMENT IN ADVANCED DEMENTIA (PAINAD): TOTALSCORE: MEDICATION (SEE MAR);COLD APPLIED

## 2024-10-22 NOTE — OP NOTE
Arthroplasty Total Hip Posterior Approach (L) Operative Note     Date: 10/22/2024  OR Location: [facility] OR    Name: Kyle Argueta, : 1955, Age: 69 y.o., MRN: [id number], Sex: male    Diagnosis  Pre-op Diagnosis      * Other osteonecrosis, left femur (Multi) [M87.852] Post-op Diagnosis     * Other osteonecrosis, left femur (Multi) [M87.852]     Procedures  Arthroplasty Total Hip Posterior Approach  63420 - [place] ARTHRP ACETBLR/PROX FEM PROSTC AGRFT/ALGRFT    Left total hip replacement    Incision time: 0835  Procedure conclusion: 955    Surgeons      * Amol Garcia - Primary    Resident/Fellow/Other Assistant:  Surgeons and Role:  * No surgeons found with a matching role *    Procedure Summary  Anesthesia: Monitor Anesthesia Care, Spinal  ASA: III  Anesthesia Staff: Anesthesiologist: Murphy Benoit MD  C-AA: MAVIS Courtney  Frontline Breaker: CHEPE Powell-CRNA  Estimated Blood Loss: 150 mL  Intra-op Medications:   Administrations occurring from 0730 to 1045 on 10/22/24:   Medication Name Total Dose   dexAMETHasone (Decadron) PF injection 10 mg/mL 10 mg   fentaNYL (Sublimaze) injection 50 mcg/mL 100 mcg   ketamine (Ketalar) 500 mg in dextrose 5% 250 mL (2 mg/mL) infusion 72.5 mg   lidocaine PF (Xylocaine-MPF) local injection 2 % 100 mg   midazolam (Versed) injection 1 mg/mL 2 mg   phenylephrine 100 mcg/mL syringe 10 mL (prefilled) 1,000 mcg   propofol (Diprivan) injection 10 mg/mL 577.91 mg   rocuronium (Zemuron) 50 mg/5 mL prefilled syringe 70 mg   ceFAZolin (Ancef) 2 g in dextrose (iso)  mL 2 g              Anesthesia Record               Intraprocedure I/O Totals          Intake    Ketamine 0.00 mL    The total shown is the total volume documented since Anesthesia Start was filed.    Total Intake 0 mL          Specimen: No specimens collected     Staff:   Relief Circulator: Jose Rachel Person: Jessie Rachel Person: Darling Jain Circulator: Mireille         Drains and/or  Catheters: * None in log *    Tourniquet Times:         Implants:  Implants       Type Name Action Serial No.      Joint Hip ACETABULAR SHELL Implanted NONE     Joint Hip SCREW, LOW-PROFILE, G7, 6.5MM X 25MM, DOME - SNONE - [id number] Implanted NONE     Joint Hip SCREW, LOW-PROFILE, G7, 6.5MM X 20MM, DOME - SNONE - [id number] Implanted NONE     Joint Hip AVENIR HIP Implanted NONE     Joint Hip POLY Implanted NONE     Joint Hip FEM HEAD Implanted NONE              SURGICAL IMPLANTS:  -Biomet G7 multihole acetabular shell, 58 mm outer diameter with 20/25 mm screws  -Biomet E1 polyethylene acetabular liner, 36 mm inner diameter, neutral obliquity  -Constance Avenir complete femoral stem, size 4, high offset, 12/14 taper  -Biolox 36+0 mm head    OPERATIVE INDICATIONS:  The patient is a very pleasant 69 year-old male indicated for left total hip arthroplasty in the setting of advanced osteoarthritis presumably secondary to osteonecrosis of the femoral head. The patient has been experiencing significant pain and functional debility which have been refractory to a course of conservative management including activity modification, home flexibility and strengthening, use of assistive aids, nonopioid analgesics.  The patient has been experiencing difficulty with activities of daily living including ascending and descending stairs, performing self care, and standing or walking for prolonged periods. The risks, benefits, alternatives, and convalescence were discussed with the patient for left total hip arthroplasty. Risks discussed included, but were not limited to, infection, DVT, pulmonary embolism, hematoma formation, wound healing complications, intraoperative and postoperative fracture, postoperative instability including dislocation, apparent or real limb length discrepancy, cardiopulmonary complications including myocardial infarction and respiratory insufficiency, neurovascular complications including cerebrovascular  accident and sciatic/femoral nerve palsy, osteolysis, component wear, aseptic or septic loosening of components, need for further surgery, acute and/or chronic postoperative pain, anesthetic complications, COVID-specific risk, and death. The patient verbalized understanding and agreed to proceed after participating in the process shared decision making. The patient's chart was reviewed for venous thromboembolic and cardiovascular risk factors within 30 days of surgery.    MRSA/MSSA carrier status was negative preoperatively as per nasal swab.  The patient underwent routine nasal decolonization as per institutional protocol.    OPERATIVE TECHNIQUE:  The patient was identified in the preoperative holding area through direct interview, as well as review of chart materials and the patient's identification band. The correct surgical site (left hip) was marked with indelible ink following confirmation with the patient. The patient was transported operative theater following preoperative safety huddle.  General anesthetic was administered.  No Duke catheter was placed. Preoperative antibiotic prophylaxis consisted of Ancef 2 gram IV, which was administered within 30-60 minutes of skin incision.  It was confirmed that the patient had received oral tranexamic acid in the preoperative holding area.    The patient was positioned in lateral decubitus the operative hip facing the ceiling.  All bony prominences were padded and an axillary roll placed.  Position with secured with a peg board apparatus.  The operative extremity was prepped and draped in the usual sterile fashion.    Surgical timeout was performed to confirm patient identity, laterality of the procedure, availability of appropriate implants, and availability of pertinent imaging studies.  A posterolateral incision was based off the posterior third of the greater trochanter. Sharp dissection was carried down through the subcutaneous layer and all bleeders  cauterized. The deep fascial layer was divided in line with its fibers and a Charnley bow retractor was placed under the fascial edges, taking care to palpate and protect the sciatic nerve throughout this process. The hip was internally rotated and a #2 retractor placed under the gluteus medius fibers. Piriformis tendon was identified and preserved.  The inferior fibers of the gluteus minimus were cauterized and the #2 retractor was repositioned under the minimus. Trapezoidal capsulotomy was performed and the capsule was tagged with additional #5 Ethibond sutures. Accessible portions of the posterior acetabular labrum were excised this point.  The hip was dislocated with a combination of flexion, internal rotation, and adduction. A blunt retractor was placed adjacent to the lesser trochanter. Soft tissue was dissected from the posterior extent of the femoral neck and intertrochanteric region. Femoral neck osteotomy was marked with electrocautery at reference location 10 mm proximal to the lesser trochanter, based on preoperative templating.  A neck cutting guide was utilized to determine the trajectory of the osteotomy, which was then created utilizing a single-sided reciprocating saw. The femoral head was extracted and native diameter was measured at 50-52 mm.    Anterior and posterior acetabular retractors were positioned and an [place]hill retractor was placed between the gluteus medius fibers and the posterior capsular flap.  This afforded excellent visualization of the acetabulum. Remaining portions of the acetabular labrum were excised with electrocautery. The transverse acetabular ligament was retained for orientation purposes. Portions of the inferior capsule were cauterized to facilitate exposure. The cotyloid body was excised with electrocautery. Once we had obtained adequate exposure, reaming commenced with a 48 mm reamer which was medialized to within 0.5 mm of the base of the cotyloid fossa. Reaming then  progressed sequentially with reamer maintained in 40° of abduction and 20-25° of anteversion, to a reamer size of 58 mm, which exposed a circumferential bed of bleeding cancellous bone. The acetabulum was inspected for residual cysts which were bone graft with cancellous allograft.  A 58 mm G7 multihole shell was impacted in 40° of abduction and 20-25° of anteversion. Appropriate shell position was confirmed utilizing anatomic landmarks, including the transverse acetabular ligament and the anterior acetabular shelf. The acetabular shell was well approximated against native bone and was secured in position with 2 screws placed in the posterior superior acetabular quadrant.  A trial neutral liner was placed.  Large anterior acetabular osteophyte had been noted prior to acetabular preparation and a portion of this was removed to facilitate reamer insertion and removal.  Remaining anterior, posterior, and inferior acetabular osteophytes were removed with a half-inch curved osteotome and mallet as necessary.    Attention was turned to the proximal femur which was exposed with a proximal femoral elevator and a blunt retractor adjacent to the lesser trochanter. Redundant soft tissue was removed adjacent to the greater trochanter. The box osteotome was passed, followed by the canal reamer and a rat tailed rasp. Broaching commenced with a starter broach, and progressed sequentially to a size 4 broach which provided excellent rotational stability and seated at a level 2 mm proud of the femoral neck osteotomy. The broaches were maintained in 20° of anteversion throughout this process, consistent with the patient's native femoral anatomy. A standard offset neck trial was placed with a 36+0 mm head. Reduction was achieved with longitudinal traction and manual pressure using a head pusher. With the construct reduced, limb lengths were noted to be roughly equivalent, with the operative side slightly long but patient with known  shortening of contralateral hip due to advanced contralateral degenerative joint disease.. Lateral soft tissue tension was lax and longitudinal laxity with shuck test was estimated at 4 mm. Trialing the hip in deep flexion, flexion to 90° with internal rotation to 60°, and external rotation revealed. Based on trialing, the construct was modified to high offset neck.      Trial components were removed and the wound was irrigated with sterile saline.  The final 36 mm inner diameter acetabular liner with neutral obliquity was secured into the locking mechanism of the acetabular shell. The final size 4 Avenir complete femoral stem with high offset was impacted and seated at the same level as compared with the final broach.  A 36 mm diameter Biolox head with +0 mm neck length was impacted onto the trunnion. Final reduction was performed and we confirmed restoration of limb lengths and maintenance of stability throughout range of motion.    The wound was bathed with dilute Betadine solution, then rinsed with sterile saline. All members of the surgical team exchanged their outer gloves.  The piriformis tendon and posterior capsular flap were repaired to the insertion of the gluteus medius tendon on the greater trochanter, using the previously placed #5 Ethibond sutures which were now passed in horizontal mattress fashion using a free needle. The deep fascial layer reapproximated with #1 Vicryl placed in figure-of-eight fashion, then oversewn with a running #1 Stratofix suture. Deep subcutaneous and deep dermal layers were reapproximated with 2-0 Vicryl placed in inverted simple fashion. Skin was closed with 3-0 stratafix.  Prineo mesh was placed over the skin incision, infused with skin adhesive, and allowed to dry.  Mepilex dressing was placed over the incision. The patient was transferred to the hospital bed after placement of a hip abduction pillow, then subsequently transported to the  PACU in satisfactory condition.      All sponge and instrument counts were correct at the end of the case.     ASSISTANTS:  No qualified resident was available to assist with the case.    Una Rose was utilized as first assistant on the case. Duties included, but were not limited to, positioning the patient for surgery, maintenance of retractors to facilitate exposure, assistance with positioning to facilitate final and trial reduction components, superficial and deep layers of wound closure, and application of final dressing.    Darling Thompson was utilized as second assistant on the case. Duties included, but were not limited to, positioning the patient for surgery, maintenance of retractors to facilitate exposure, assistance with positioning to facilitate final and trial reduction components, superficial and deep layers of wound closure, and application of final dressing.     POSTOPERATIVE CARE:  The patient will be allowed to bear full weight as tolerated with a walker for assistance, no pivoting on the operative extremity.  The patient will be evaluated by physical and occupational therapy, and I anticipate that the patient will be a reasonable candidate for discharge home in 1-2 day(s).  For purposes of postoperative antibiotic prophylaxis, the patient will receive intravenous Ancef with weight-based dosing every 8 hours, total antibiotic duration not to exceed 23 hours.  Postoperative DVT chemoprophylaxis will consist of aspirin 81 mg by mouth twice daily, with first dose to be given this evening.    Amol Garcia M.D.  Orthopedic Surgery      Amol Garcia  Phone Number: [phone]

## 2024-10-22 NOTE — CARE PLAN
The patient's goals for the shift include        Problem: Skin  Goal: Decreased wound size/increased tissue granulation at next dressing change  10/22/2024 1238 by Alison Ibarra RN  Outcome: Progressing  Flowsheets (Taken 10/22/2024 1238)  Decreased wound size/increased tissue granulation at next dressing change:   Promote sleep for wound healing   Protective dressings over bony prominences  10/22/2024 1236 by Alison Ibarra RN  Outcome: Progressing

## 2024-10-22 NOTE — ANESTHESIA PROCEDURE NOTES
Peripheral IV  Date/Time: 10/22/2024 8:12 AM  Inserted by: MAVIS Courtney    Placement  Needle size: 18 G  Laterality: left  Location: hand  Local anesthetic: none  Site prep: alcohol  Technique: anatomical landmarks  Attempts: 1

## 2024-10-22 NOTE — ANESTHESIA PROCEDURE NOTES
Airway  Date/Time: 10/22/2024 8:01 AM  Urgency: elective    Airway not difficult    Staffing  Performed: MAVIS   Authorized by: Murphy Benoit MD    Performed by: MAVIS Courtney  Patient location during procedure: OR    Indications and Patient Condition  Indications for airway management: anesthesia  Spontaneous Ventilation: absent  Sedation level: deep  Preoxygenated: yes  Patient position: sniffing  MILS maintained throughout  Mask difficulty assessment: 3 - difficult mask (inadequate, unstable or two providers) +/- NMBA    Final Airway Details  Final airway type: endotracheal airway      Successful airway: ETT  Cuffed: yes   Successful intubation technique: direct laryngoscopy  Facilitating devices/methods: intubating stylet and cricoid pressure  Endotracheal tube insertion site: oral  Blade: Marissa  Blade size: #4  ETT size (mm): 7.5  Cormack-Lehane Classification: grade IIb - view of arytenoids or posterior of glottis only  Placement verified by: chest auscultation and capnometry   Cuff volume (mL): 6  Measured from: lips  ETT to lips (cm): 22  Number of attempts at approach: 1    Additional Comments  Difficult mask due to facial hair and short thryomental distance. 90mm Oral airway used. Lips and teeth in pre induction conditions

## 2024-10-22 NOTE — ANESTHESIA PREPROCEDURE EVALUATION
Patient: Kyle Argueta    Procedure Information       Date/Time: 10/22/24 0730    Procedure: Arthroplasty Total Hip Posterior Approach (Left: Hip)    Location: [facility] OR 10 / [facility]    Surgeons: Amol Garcia MD            Relevant Problems   Cardiac   (+) Hypertension       Clinical information reviewed:   Tobacco  Allergies  Meds  Problems  Med Hx  Surg Hx   Fam Hx  Soc   Hx        NPO Detail:  NPO/Void Status  Date of Last Liquid: 10/22/24  Time of Last Liquid: 0656  Date of Last Solid: 10/21/24  Time of Last Solid: 1200  Last Intake Type: Clear fluids  Time of Last Void: 0500         Physical Exam    Airway  Mallampati: III  TM distance: >3 FB  Neck ROM: limited     Cardiovascular - normal exam     Dental   Comments: Poor dental hygiene   Pulmonary - normal exam     Abdominal - normal exam       Other findings: Resting tremor, mostly noticeable in LUE. States, he does not know the reason for it.        Vitals:    10/22/24 0657   BP: 144/63   Pulse: 94   Resp: 18   Temp: 37.1 °C (98.8 °F)   SpO2: 97%       Past Surgical History:   Procedure Laterality Date    OTHER SURGICAL HISTORY      ORIF left ankle fracture; repair of tibio-fibular syndesmotic     Past Medical History:   Diagnosis Date    Alcohol abuse     Anemia     Arthritis     Avascular necrosis of bones of both hips (Multi)     Elevated liver enzymes     Hyperlipidemia     Hypertension        Current Facility-Administered Medications:     ceFAZolin (Ancef) 2 g in dextrose (iso)  mL, 2 g, intravenous, Once, Amol Garcia MD  Prior to Admission medications    Medication Sig Start Date End Date Taking? Authorizing Provider   lisinopril 20 mg tablet Take 1 tablet (20 mg) by mouth once daily.   Yes Historical Provider, MD   chlorhexidine (Peridex) 0.12 % solution Swish and spit 15 ml for 2 doses, 15mL the night before surgery and 15 ml morning of surgery - swish for 30 seconds -DO NOT SWALLOW, SPIT OUT 10/9/24 10/22/24 Yes  "Eloisenicole IVETH Salgado, APRN-CNP     No Known Allergies  Social History     Tobacco Use    Smoking status: Never     Passive exposure: Past    Smokeless tobacco: Never   Substance Use Topics    Alcohol use: Not Currently     Comment: Per pt states \"quit 3 months ago\".         Chemistry    Lab Results   Component Value Date/Time     10/09/2024 1141    K 4.5 10/09/2024 1141     10/09/2024 1141    CO2 27 10/09/2024 1141    BUN 19 10/09/2024 1141    CREATININE 0.73 10/09/2024 1141    Lab Results   Component Value Date/Time    CALCIUM 9.3 10/09/2024 1141    ALKPHOS 70 09/11/2024 0424    AST 26 09/11/2024 0424    ALT 18 09/11/2024 0424    BILITOT 0.5 09/11/2024 0424          Lab Results   Component Value Date/Time    WBC 6.6 10/09/2024 1141    HGB 9.7 (L) 10/09/2024 1141    HCT 30.9 (L) 10/09/2024 1141     10/09/2024 1141     Lab Results   Component Value Date/Time    PROTIME 14.5 (H) 09/09/2024 1626    INR 1.3 (H) 09/09/2024 1626     Encounter Date: 09/09/24   ECG 12 lead   Result Value    Ventricular Rate 91    Atrial Rate 91    MT Interval 140    QRS Duration 90    QT Interval 348    QTC Calculation(Bazett) 428    P Axis 63    R Axis -40    T Axis 37    QRS Count 15    Q Onset 214    P Onset 144    P Offset 190    T Offset 388    QTC Fredericia 400    Narrative    Normal sinus rhythm  Left axis deviation  Abnormal ECG  No previous ECGs available  See ED provider note for full interpretation and clinical correlation  Confirmed by Rashmi Coy ([id number]) on 9/15/2024 11:46:40 AM        Anesthesia Plan    History of general anesthesia?: yes  History of complications of general anesthesia?: no    ASA 3     general     The patient is not a current smoker.    intravenous induction   Anesthetic plan and risks discussed with patient.    Plan discussed with CAA.      "

## 2024-10-22 NOTE — ANESTHESIA POSTPROCEDURE EVALUATION
Patient: Kyle Argueta    Procedure Summary       Date: 10/22/24 Room / Location: [facility] OR 10 / [facility]    Anesthesia Start: 0752 Anesthesia Stop: 1038    Procedure: Arthroplasty Total Hip Posterior Approach (Left: Hip) Diagnosis:       Other osteonecrosis, left femur (Multi)      (M16.12 - Unilateral primary osteoarthritis, left hip)    Surgeons: Amol Garcia MD Responsible Provider: Murphy Benoit MD    Anesthesia Type: spinal, MAC ASA Status: 3            Anesthesia Type: spinal, MAC    Vitals Value Taken Time   /66 10/22/24 1039   Temp 36.4 10/22/24 1039   Pulse 83 10/22/24 1039   Resp 12 10/22/24 1039   SpO2 100 10/22/24 1039       Anesthesia Post Evaluation    Patient location during evaluation: PACU  Patient participation: complete - patient participated  Level of consciousness: awake and alert  Pain management: satisfactory to patient  Airway patency: patent  Cardiovascular status: acceptable  Respiratory status: acceptable, face mask, spontaneous ventilation and nonlabored ventilation  Hydration status: acceptable  Postoperative Nausea and Vomiting: none        No notable events documented.

## 2024-10-22 NOTE — PROGRESS NOTES
Physical Therapy    Physical Therapy Evaluation    Patient Name: Kyle Argueta  MRN: [id number]  Today's Date: 10/22/2024   Time Calculation  Start Time: 1712  Stop Time: 1734  Time Calculation (min): 22 min  4114/4114-A    Assessment/Plan   PT Assessment  PT Assessment Results: Decreased strength, Decreased endurance, Impaired balance, Decreased mobility  Rehab Prognosis: Good  Evaluation/Treatment Tolerance: Patient tolerated treatment well  Medical Staff Made Aware: Yes  End of Session Communication: Bedside nurse  Assessment Comment: Pt is a 69 y.o. male admitted for Other osteonecrosis, left femur (Multi) [M87.852] on 10/22/2024. Pt below functional level and will benefit from skilled therapy during stay to improve overall functional mobility, strength, ROM, endurance and safety awareness. Upon discharge pt will benefit from low intensity therapy for continued improvement in functional mobility. Therapy will continue to follow and reassess each session.      End of Session Patient Position: Up in chair  IP OR SWING BED PT PLAN  Inpatient or Swing Bed: Inpatient  PT Plan  Treatment/Interventions: Bed mobility, Transfer training, Gait training, Stair training, Balance training, Strengthening, Therapeutic exercise, Therapeutic activity, Endurance training, Home exercise program  PT Plan: Ongoing PT  PT Frequency: BID  PT Discharge Recommendations: Low intensity level of continued care (pending safety with stairs)  Equipment Recommended upon Discharge: Wheeled walker  PT Recommended Transfer Status: Assist x1  PT - OK to Discharge: Yes    Subjective     Current Problem:  L THR  Patient Active Problem List   Diagnosis    Acute renal failure, unspecified acute renal failure type (CMS-HCC)    Other osteonecrosis, left femur (Multi)    Hypertension    Acute postoperative pain    Status post left hip replacement       General Visit Information:  General  Reason for Referral: impaired mobility s/p L THR  Referred By:  Dr. Argueta  Prior to Session Communication: Bedside nurse  Patient Position Received: Bed, 3 rail up  Preferred Learning Style: kinesthetic, verbal  General Comment: Pt agreeable to therapy    Home Living:  Home Living  Type of Home: House  Lives With:  (Pt states that brother can live with him for a few days.)  Home Adaptive Equipment: Walker rolling or standard  Home Layout: One level  Home Access: Stairs to enter with rails  Entrance Stairs-Rails: Both  Entrance Stairs-Number of Steps: 3  Bathroom Shower/Tub: Walk-in shower  Bathroom Equipment: Shower chair with back    Prior Level of Function:  Prior Function Per Pt/Caregiver Report  Level of [place]: Independent with ADLs and functional transfers (modified indep, mostly WC bound for the past 2 months due to hip pain.)  Receives Help From: Friends    Precautions:  Precautions  LE Weight Bearing Status: Weight Bearing as Tolerated  Medical Precautions: Fall precautions  Post-Surgical Precautions: Left hip precautions  Posterior hip precautions educated    Vital Signs:     Objective     Pain:  Pain Assessment  Pain Assessment: 0-10  0-10 (Numeric) Pain Score: 5 - Moderate pain  Pain Type: Surgical pain  Pain Location: Hip  Pain Orientation: Left    Cognition:  Cognition  Overall Cognitive Status: Within Functional Limits  Orientation Level: Oriented X4    General Assessments:      Activity Tolerance  Endurance: Tolerates 10 - 20 min exercise with multiple rests                 Static Sitting Balance  Static Sitting-Comment/Number of Minutes: good  Dynamic Sitting Balance  Dynamic Sitting-Comments: good  Static Standing Balance  Static Standing-Comment/Number of Minutes: fair  Dynamic Standing Balance  Dynamic Standing-Comments: fair    Functional Assessments:     Bed Mobility  Bed Mobility: Yes  Bed Mobility 1  Bed Mobility 1: Supine to sitting  Level of Assistance 1: Minimum assistance  Transfers  Transfer: Yes  Transfer 1  Transfer From 1: Bed  to  Transfer to 1: Chair with arms  Technique 1: Sit to stand, Stand to sit  Transfer Device 1: Walker, Gait belt  Transfer Level of Assistance 1: Minimum assistance  Ambulation/Gait Training  Ambulation/Gait Training Performed: Yes  Ambulation/Gait Training 1  Surface 1: Level tile  Device 1: Rolling walker  Gait Support Devices: Gait belt  Assistance 1: Contact guard  Quality of Gait 1: Antalgic, Decreased step length  Comments/Distance (ft) 1: 5 ft from bed to chair          Extremity/Trunk Assessments:   Ankle pumps, quad sets, glut sets, seated LAQs x 10 reps each. Instructed in incentive spirometry 10 reps every hour.            Outcome Measures:     [facility] Basic Mobility  Turning from your back to your side while in a flat bed without using bedrails: A little  Moving from lying on your back to sitting on the side of a flat bed without using bedrails: A little  Moving to and from bed to chair (including a wheelchair): A little  Standing up from a chair using your arms (e.g. wheelchair or bedside chair): A little  To walk in hospital room: A lot  Climbing 3-5 steps with railing: A lot  Basic Mobility - Total Score: 16        Goals:  Encounter Problems          PT Problem       Pt will demonstrate mod I for all bed mobility   (Progressing)       Start:  10/22/24    Expected End:  10/29/24            Pt will demonstrate mod I for all transfers with WW  (Progressing)       Start:  10/22/24    Expected End:  10/29/24            Pt will ambulate 150 ft with WW and mod I.  (Progressing)       Start:  10/22/24    Expected End:  10/29/24            Pt will be able to negotiate 3 steps with WW and mod I.  (Progressing)       Start:  10/22/24    Expected End:  10/29/24               Pain - Adult            Education Documentation  Precautions, taught by Bethany Varela, PT at 10/22/2024  7:23 PM.  Learner: Patient  Readiness: Acceptance  Method: Explanation  Response: Verbalizes Understanding, Needs  Reinforcement    Body Mechanics, taught by Bethany Varela, PT at 10/22/2024  7:23 PM.  Learner: Patient  Readiness: Acceptance  Method: Explanation  Response: Verbalizes Understanding, Needs Reinforcement    Mobility Training, taught by Bethany Varela, PT at 10/22/2024  7:23 PM.  Learner: Patient  Readiness: Acceptance  Method: Explanation  Response: Verbalizes Understanding, Needs Reinforcement    Education Comments  No comments found.

## 2024-10-22 NOTE — DISCHARGE INSTRUCTIONS
ORTHOPEDIC SURGERY DISCHARGE INSTRUCTIONS:    Attending Orthopaedic Surgeon: Amol Garcia MD    Other providers: None    Principal diagnosis: Osteonecrosis of left femoral head, with secondary osteoarthritis of left hip    Other diagnoses:  Leukocytosis, acute postoperative anemia due to surgical blood loss, acute postoperative pain    Procedure(s) Performed:  -Left total hip replacement on 10/22    Activity Instructions:  -Full weightbearing as tolerated, with walker or cane as needed for assistance.  -No pivoting on left lower extremity (for example, for transfers from bed to chair)  -Do not bend left hip past the horizon (no hip flexion past 90 degrees)  -Do not cross left thigh over the center line of your body  -No squatting, kneeling, crouching  -Keep either foam hip wedge or 2-3 pillows between legs while seated or lying in bed, to prevent scissoring of the thighs  -No driving until on or after 11/3    Diet:  -Resume usual diet.  -Recommend taking a protein supplement (for example, [place] Instant Breakfast, Boost, Ensure; Glucerna if diabetic) one serving 3 times daily to decrease swelling and promote incisional healing.    Dressing Care:  -Mepilex dressing to remain in place on left hip and thigh until follow up appointment with Dr. Garcia.  -May shower or sponge bathe with Mepilex dressing in place, then pat dry with towel.  Recommend using shower bench/chair.  -No tub bathing.  -Do not apply lotions, oils, ointments, creams, or powders near dressing.    Medications after Discharge:  -IMPORTANT: Take ASPIRIN 81 mg tablet by mouth TWICE DAILY for prevention of blood clots (thromboembolism).  Continue on a regular schedule for 30 days.    -Take pantoprazole (protonix) 20 mg tablet by mouth once daily, prior to breakfast.  This is an antacid medication to prevent side effects from aspirin therapy.  Continue on a regular schedule for 30 days.    -Please refer to medicine reconciliation form for  additional medication instructions.    Other Instructions:  -May apply ice pack to left hip and thigh for 20 minutes every 4 hours while awake, as needed to control inflammation/swelling.    Follow-up with Orthopaedic Surgery:  -You will need to be seen by Dr. Garcia in office between 10/29 and 11/1 for postoperative assessment and incision check following left total hip replacement.  Please call [phone] within 2-3 days following discharge from the hospital to schedule, confirm, or modify this appointment.    Amol Garcia MD  Orthopaedic Surgery, Specialist in Total Hip and Knee Replacement and Revision    [facility]  -[address] [place]  [id number] [place]  [address]

## 2024-10-23 PROBLEM — D62 ACUTE POSTOPERATIVE ANEMIA DUE TO EXPECTED BLOOD LOSS: Status: ACTIVE | Noted: 2024-10-23

## 2024-10-23 LAB
ANION GAP SERPL CALC-SCNC: 12 MMOL/L (ref 10–20)
BLOOD EXPIRATION DATE: NORMAL
BUN SERPL-MCNC: 20 MG/DL (ref 6–23)
CALCIUM SERPL-MCNC: 8.6 MG/DL (ref 8.6–10.3)
CHLORIDE SERPL-SCNC: 104 MMOL/L (ref 98–107)
CO2 SERPL-SCNC: 26 MMOL/L (ref 21–32)
CREAT SERPL-MCNC: 0.86 MG/DL (ref 0.5–1.3)
DISPENSE STATUS: NORMAL
EGFRCR SERPLBLD CKD-EPI 2021: >90 ML/MIN/1.73M*2
ERYTHROCYTE [DISTWIDTH] IN BLOOD BY AUTOMATED COUNT: 14.6 % (ref 11.5–14.5)
GLUCOSE SERPL-MCNC: 140 MG/DL (ref 74–99)
HCT VFR BLD AUTO: 21.9 % (ref 41–52)
HCT VFR BLD AUTO: 24.7 % (ref 41–52)
HGB BLD-MCNC: 6.9 G/DL (ref 13.5–17.5)
HGB BLD-MCNC: 7.9 G/DL (ref 13.5–17.5)
MCH RBC QN AUTO: 32.1 PG (ref 26–34)
MCHC RBC AUTO-ENTMCNC: 31.5 G/DL (ref 32–36)
MCV RBC AUTO: 102 FL (ref 80–100)
NRBC BLD-RTO: 0 /100 WBCS (ref 0–0)
PLATELET # BLD AUTO: 245 X10*3/UL (ref 150–450)
POTASSIUM SERPL-SCNC: 4.3 MMOL/L (ref 3.5–5.3)
PRODUCT BLOOD TYPE: 6200
PRODUCT CODE: NORMAL
RBC # BLD AUTO: 2.15 X10*6/UL (ref 4.5–5.9)
SODIUM SERPL-SCNC: 138 MMOL/L (ref 136–145)
UNIT ABO: NORMAL
UNIT NUMBER: NORMAL
UNIT RH: NORMAL
UNIT VOLUME: 350
WBC # BLD AUTO: 13.8 X10*3/UL (ref 4.4–11.3)
XM INTEP: NORMAL

## 2024-10-23 PROCEDURE — 97165 OT EVAL LOW COMPLEX 30 MIN: CPT | Mod: GO | Performed by: OCCUPATIONAL THERAPIST

## 2024-10-23 PROCEDURE — 85027 COMPLETE CBC AUTOMATED: CPT | Performed by: ORTHOPAEDIC SURGERY

## 2024-10-23 PROCEDURE — 99024 POSTOP FOLLOW-UP VISIT: CPT | Performed by: NURSE PRACTITIONER

## 2024-10-23 PROCEDURE — 36415 COLL VENOUS BLD VENIPUNCTURE: CPT | Performed by: ORTHOPAEDIC SURGERY

## 2024-10-23 PROCEDURE — 97110 THERAPEUTIC EXERCISES: CPT | Mod: GP,CQ

## 2024-10-23 PROCEDURE — 84520 ASSAY OF UREA NITROGEN: CPT | Performed by: ORTHOPAEDIC SURGERY

## 2024-10-23 PROCEDURE — 2500000004 HC RX 250 GENERAL PHARMACY W/ HCPCS (ALT 636 FOR OP/ED): Performed by: ORTHOPAEDIC SURGERY

## 2024-10-23 PROCEDURE — 2500000001 HC RX 250 WO HCPCS SELF ADMINISTERED DRUGS (ALT 637 FOR MEDICARE OP): Performed by: ORTHOPAEDIC SURGERY

## 2024-10-23 PROCEDURE — 85014 HEMATOCRIT: CPT | Mod: 59 | Performed by: PHYSICIAN ASSISTANT

## 2024-10-23 PROCEDURE — 36430 TRANSFUSION BLD/BLD COMPNT: CPT

## 2024-10-23 PROCEDURE — P9016 RBC LEUKOCYTES REDUCED: HCPCS

## 2024-10-23 PROCEDURE — 2500000002 HC RX 250 W HCPCS SELF ADMINISTERED DRUGS (ALT 637 FOR MEDICARE OP, ALT 636 FOR OP/ED): Performed by: ORTHOPAEDIC SURGERY

## 2024-10-23 PROCEDURE — 36415 COLL VENOUS BLD VENIPUNCTURE: CPT | Performed by: PHYSICIAN ASSISTANT

## 2024-10-23 PROCEDURE — 7100000011 HC EXTENDED STAY RECOVERY HOURLY - NURSING UNIT

## 2024-10-23 PROCEDURE — 97116 GAIT TRAINING THERAPY: CPT | Mod: GP,CQ

## 2024-10-23 ASSESSMENT — PAIN SCALES - GENERAL
PAINLEVEL_OUTOF10: 5 - MODERATE PAIN
PAINLEVEL_OUTOF10: 0 - NO PAIN
PAINLEVEL_OUTOF10: 0 - NO PAIN
PAINLEVEL_OUTOF10: 5 - MODERATE PAIN
PAINLEVEL_OUTOF10: 5 - MODERATE PAIN
PAINLEVEL_OUTOF10: 0 - NO PAIN

## 2024-10-23 ASSESSMENT — COGNITIVE AND FUNCTIONAL STATUS - GENERAL
HELP NEEDED FOR BATHING: A LOT
MOVING FROM LYING ON BACK TO SITTING ON SIDE OF FLAT BED WITH BEDRAILS: A LITTLE
WALKING IN HOSPITAL ROOM: A LOT
PERSONAL GROOMING: A LITTLE
TURNING FROM BACK TO SIDE WHILE IN FLAT BAD: A LITTLE
STANDING UP FROM CHAIR USING ARMS: A LITTLE
MOBILITY SCORE: 16
DRESSING REGULAR UPPER BODY CLOTHING: A LITTLE
MOVING TO AND FROM BED TO CHAIR: A LITTLE
DRESSING REGULAR LOWER BODY CLOTHING: A LOT
CLIMB 3 TO 5 STEPS WITH RAILING: A LOT
DAILY ACTIVITIY SCORE: 17
TOILETING: A LITTLE

## 2024-10-23 ASSESSMENT — PAIN - FUNCTIONAL ASSESSMENT
PAIN_FUNCTIONAL_ASSESSMENT: 0-10

## 2024-10-23 NOTE — ASSESSMENT & PLAN NOTE
S/p left total hip arthroplasty, posterior approach    Plan:  POD #1 s/p left  DEBORAH  Continue PT/OT, WBAT as tolerated  LE with a walker, no pivoting on operating extremity  Using incentive spirometer   Reviewed am labs  Multimodal pain regimen  Surgical hip dressing to be removed on post op day #7  VTE prophylaxis:   ASA 81MG BID  Continue home lisinopril and pantoprazole  Heaven-colace for bowel regimen  When appropriate, will discharge home tomorrow

## 2024-10-23 NOTE — PROGRESS NOTES
"Kyle Argueta is a 69 y.o. male admitted following left total hip replacement.    Subjective   Hemoglobin 6.9 this morning with a massiel systolic blood pressure 95, improved with IV fluids and 1 unit packed red blood cells.  Patient notes mild fatigue.  Preoperative hemoglobin 9.7.       Objective   Alert, oriented x 3, cooperative with examination.  No generalized pallor.  Examination of the left lower extremity reveals Mepilex dressing is clean, dry, and intact.  Left EHL, plantarflexion, dorsiflexion, ankle inversion and eversion are 4+/5.  Able to isometrically fire left quadriceps.  Sensory intact light touch in the deep/superficial/common peroneal, saphenous, tibial, sural nerve distributions as well as along the cutaneous distributions of the thigh.  DP pulse 2+ with capillary refill less than 2 seconds.  Negative Homans' sign.      Last Recorded Vitals  Blood pressure (!) 100/48, pulse 67, temperature 36.9 °C (98.4 °F), temperature source Temporal, resp. rate 18, height 1.651 m (5' 5\"), weight 77.1 kg (170 lb), SpO2 96%.  Intake/Output last 3 Shifts:  I/O last 3 completed shifts:  In: 1084.2 (14.1 mL/kg) [I.V.:1084.2 (14.1 mL/kg)]  Out: 675 (8.8 mL/kg) [Urine:675 (0.2 mL/kg/hr)]  Weight: 77.1 kg     Relevant Results      Scheduled medications  acetaminophen, 975 mg, oral, q8h ISAIAH  aspirin, 81 mg, oral, BID  influenza, 0.5 mL, intramuscular, During hospitalization  lisinopril, 20 mg, oral, Daily  pantoprazole, 20 mg, oral, Daily before breakfast  pneumoc 20-chris conj-dip cr(PF), 0.5 mL, intramuscular, During hospitalization  sennosides-docusate sodium, 1 tablet, oral, Daily      Continuous medications  oxygen, 2 L/min      PRN medications  PRN medications: benzocaine-menthol, diphenhydrAMINE, magnesium hydroxide, morphine, naloxone, oxyCODONE, oxyCODONE, oxyCODONE  Results for orders placed or performed during the hospital encounter of 10/22/24 (from the past 24 hours)   CBC   Result Value Ref Range    WBC " 13.8 (H) 4.4 - 11.3 x10*3/uL    nRBC 0.0 0.0 - 0.0 /100 WBCs    RBC 2.15 (L) 4.50 - 5.90 x10*6/uL    Hemoglobin 6.9 (L) 13.5 - 17.5 g/dL    Hematocrit 21.9 (L) 41.0 - 52.0 %     (H) 80 - 100 fL    MCH 32.1 26.0 - 34.0 pg    MCHC 31.5 (L) 32.0 - 36.0 g/dL    RDW 14.6 (H) 11.5 - 14.5 %    Platelets 245 150 - 450 x10*3/uL   Basic metabolic panel   Result Value Ref Range    Glucose 140 (H) 74 - 99 mg/dL    Sodium 138 136 - 145 mmol/L    Potassium 4.3 3.5 - 5.3 mmol/L    Chloride 104 98 - 107 mmol/L    Bicarbonate 26 21 - 32 mmol/L    Anion Gap 12 10 - 20 mmol/L    Urea Nitrogen 20 6 - 23 mg/dL    Creatinine 0.86 0.50 - 1.30 mg/dL    eGFR >90 >60 mL/min/1.73m*2    Calcium 8.6 8.6 - 10.3 mg/dL   Prepare RBC: 1 Units   Result Value Ref Range    PRODUCT CODE B2498U51     Unit Number [id number]     Unit ABO A     Unit RH POS     XM INTEP COMP     Dispense Status TR     Blood Expiration Date 10/27/2024 11:59:00 PM EDT     PRODUCT BLOOD TYPE 6200     UNIT VOLUME 350    Hemoglobin and Hematocrit   Result Value Ref Range    Hemoglobin 7.9 (L) 13.5 - 17.5 g/dL    Hematocrit 24.7 (L) 41.0 - 52.0 %               Postoperative AP pelvis with uncemented acetabular and femoral components in good position, no fracture or dislocation.             Assessment/Plan   Assessment & Plan  Acute postoperative pain  Continue scheduled oral acetaminophen, intermittent oxycodone.  Will  discontinue celecoxib due to anemia.  Status post left hip replacement  S/p left total hip arthroplasty, posterior approach    Plan:  POD #1 s/p left  DEBORAH  Continue PT/OT, WBAT as tolerated  LE with a walker, no pivoting on operating extremity  Using incentive spirometer   Reviewed am labs  Multimodal pain regimen  Surgical hip dressing to be removed on post op day #7  VTE prophylaxis:   ASA 81MG BID  Continue home lisinopril and pantoprazole  Heaven-colace for bowel regimen  When appropriate, will discharge home tomorrow  Acute postoperative anemia due  to expected blood loss  Hgb 6.9, transfused 1 unit prbcs.  Recheck CBC tomorrow.        Amol Garcia MD

## 2024-10-23 NOTE — ASSESSMENT & PLAN NOTE
S/p left total hip arthroplasty, posterior approach    Plan:  POD #1 s/p left  DEBORAH  Continue PT/OT, WBAT as tolerated  LE with a walker, no pivoting on operating extremity  Using incentive spirometer   Reviewed am labs  Multimodal pain regimen  Surgical hip dressing to be removed on post op day #7  VTE prophylaxis:   ASA 81MG BID  Continue home lisinopril and pantoprazole  Heaven-colace for bowel regimen  When appropriate, will discharge home in 1-2 days  Follow up with Dr. Garcia  in 2 weeks

## 2024-10-23 NOTE — PROGRESS NOTES
Spiritual Care Visit    Clinical Encounter Type  Visited With: Family, Patient  Routine Visit: Follow-up                             Advance Directives  Advance Directives Reviewed Date: 10/23/24  Advance Directives Reviewed with: Patient, Friend  Advance Directives Completed Date: 10/23/24  Advance Directives Type: Living Will, Power of  for Healthcare              Taxonomy  Intended Effects: Preserve dignity and respect, Demonstrate caring and concern  Methods: Offer spiritual/Holiness support  Interventions: Assist someone with Advance Directives, Share words of hope and inspiration

## 2024-10-23 NOTE — PROGRESS NOTES
Occupational Therapy    Evaluation    Patient Name: Kyle Argueta  MRN: [id number]  Today's Date: 10/23/2024  Time Calculation  Start Time: 1403  Stop Time: 1413  Time Calculation (min): 10 min  4114/4114-A  Eval only     Assessment  IP OT Assessment  Prognosis: Fair  End of Session Communication: Bedside nurse  End of Session Patient Position: Up in chair, Alarm on  Patient presents with decline in ADLs, functional transfers, functional mobility and would benefit from OT during acute stay to improve functional independence and safety. Recommend low intensity OT pending progress to maximize functional independence and safety.     Plan:  Treatment Interventions: ADL retraining, Functional transfer training, Patient/family training, Equipment evaluation/education, Compensatory technique education  OT Frequency: Daily  OT Discharge Recommendations: Low intensity level of continued care (pending progress)  Equipment Recommended upon Discharge: Wheeled walker (shower chair, reacher, sock aide, LH shoe horn)  OT - OK to Discharge: Yes from acute care OT services to the next level of care when cleared by medical team      Subjective   Current Problem:  No diagnosis found.    General:  General  Reason for Referral: left DEBORAH  Referred By: Amol Garcia MD  Past Medical History Relevant to Rehab: Admitted 10/23/2024 after having left DEBORAH completed by Dr Garcia.  PMH: HTN  Prior to Session Communication: Bedside nurse  Patient Position Received: Up in chair, Alarm on  Preferred Learning Style: verbal  General Comment: Patient seated in bedside chair and agreeable to participate in OT evaluation.    Precautions:  LE Weight Bearing Status: Weight Bearing as Tolerated  Medical Precautions: Fall precautions  Post-Surgical Precautions: Left hip precautions    Pain:  Pain Assessment  Pain Assessment: 0-10  0-10 (Numeric) Pain Score: 5 - Moderate pain  Pain Type: Surgical pain  Pain Location: Hip  Pain Orientation:  Left  Pain Interventions: Rest    Objective   Cognition:  Overall Cognitive Status: Within Functional Limits, flat affect    Home Living:  Type of Home: House  Home Adaptive Equipment: Walker rolling or standard  Home Layout: One level  Home Access: Stairs to enter with rails  Entrance Stairs-Rails: Both  Entrance Stairs-Number of Steps: 3  Bathroom Shower/Tub: Walk-in shower  Bathroom Equipment: Shower chair with back     Prior Function:  Level of [place]: Independent with ADLs and functional transfers  Receives Help From: Friends    ADL:  LE Dressing Assistance: Moderate (reacher to don underwear)  ADL Comments: Educated patient on use of AE for lower body dressing    Activity Tolerance:  Endurance: Endurance does not limit participation in activity    Bed Mobility/Transfers:      Transfers  Transfer:  (CGA sit <> stand with min verbal cues for proper hand placement and technique)    Extremities: RUE   RUE : Within Functional Limits and LUE   LUE: Within Functional Limits    Outcome Measures: [facility] Daily Activity  Putting on and taking off regular lower body clothing: A lot  Bathing (including washing, rinsing, drying): A lot  Putting on and taking off regular upper body clothing: A little  Toileting, which includes using toilet, bedpan or urinal: A little  Taking care of personal grooming such as brushing teeth: A little  Eating Meals: None  Daily Activity - Total Score: 17    EDUCATION:  Education  Individual(s) Educated: Patient  Education Provided: Fall precautons (hip precautions, safety and comp strategies with LB dressing)  Patient Response to Education: Patient/Caregiver Verbalized Understanding of Information    Goals:   Encounter Problems       Encounter Problems (Active)       Dressings Lower Extremities       STG - Patient will complete lower body dressing independently with AE and comp strategies  (Progressing)       Start:  10/23/24    Expected End:  11/06/24               Functional Balance        STG-Patient will be independent with assistive device dynamic stand task >5 minutes for ADL completion   (Progressing)       Start:  10/23/24    Expected End:  11/06/24               Functional Mobility       STG-Patient will be independent with assistive device functional mobility tasks   (Progressing)       Start:  10/23/24    Expected End:  11/06/24               OT Transfers       STG - Patient will perform car transfers independently demonstrating good safety  (Progressing)       Start:  10/23/24    Expected End:  11/06/24            STG-Patient will be independent with functional transfers demonstrating good safety   (Progressing)       Start:  10/23/24    Expected End:  11/06/24               Safety       STG-Patient will independently verbalize hip precautions with all functional tasks   (Progressing)       Start:  10/23/24    Expected End:  11/06/24

## 2024-10-23 NOTE — ASSESSMENT & PLAN NOTE
Continue scheduled oral acetaminophen, intermittent oxycodone.  Will  discontinue celecoxib due to anemia.

## 2024-10-23 NOTE — PROGRESS NOTES
10/23/24 0904   Discharge Planning   Living Arrangements Alone   Support Systems Friends/neighbors   Type of Residence Private residence   Home or Post Acute Services In home services   Type of Home Care Services Home PT   Expected Discharge Disposition Othe   Does the patient need discharge transport arranged? Yes   Patient Choice   Patient / Family choosing to utilize agency / facility established prior to hospitalization Yes     Met with patient at bedside. Admitted for L DEBORAH. Pt lives alone and was independent PTA with no HHC. Pt has a walker. PCP is Caden Azevedo. Pt feels he is able to manage his health and understands his medications. Pt has not been able to drive the past few months. Has a friend Anibal who is able to provide transportation. PT [facility]. Pt plans to return home with [facility]. Pt provided with number to call [facility] when he arrives home. Friend will provide transport.

## 2024-10-23 NOTE — PROGRESS NOTES
"Kyle Argueta is a 69 y.o. male on day 0 of admission presenting with Other osteonecrosis, left femur (Multi).    Subjective   The patient in bed, A&O x 3, in NAD. C/o expected post-operative pain, which is well controlled with current pain management. Denies fever, chills, dizziness, lightheadedness, SOB, n/v, chest pain, or palpitations. No issues with voiding. Noted Hgb of 6.9 this am. Transfusin of 1 Unit RBC planned.       Objective     Physical Exam  HENT:      Head: Normocephalic.      Mouth/Throat:      Mouth: Mucous membranes are moist.   Cardiovascular:      Rate and Rhythm: Normal rate and regular rhythm.      Pulses: Normal pulses.      Heart sounds: Normal heart sounds.   Pulmonary:      Effort: Pulmonary effort is normal.      Breath sounds: Normal breath sounds.   Abdominal:      General: Bowel sounds are normal.      Palpations: Abdomen is soft.   Musculoskeletal:         General: Normal range of motion.      Cervical back: Normal range of motion and neck supple.   Skin:     General: Skin is warm and dry.      Capillary Refill: Capillary refill takes less than 2 seconds.   Neurological:      General: No focal deficit present.      Mental Status: He is alert and oriented to person, place, and time.   Psychiatric:         Mood and Affect: Mood normal.         Behavior: Behavior normal.         Last Recorded Vitals  Blood pressure 100/53, pulse 70, temperature 37 °C (98.6 °F), resp. rate 18, height 1.651 m (5' 5\"), weight 77.1 kg (170 lb), SpO2 96%.  Intake/Output last 3 Shifts:  I/O last 3 completed shifts:  In: 1084.2 (14.1 mL/kg) [I.V.:1084.2 (14.1 mL/kg)]  Out: 675 (8.8 mL/kg) [Urine:675 (0.2 mL/kg/hr)]  Weight: 77.1 kg     Relevant Results      Scheduled medications  acetaminophen, 975 mg, oral, q8h ISAIAH  aspirin, 81 mg, oral, BID  celecoxib, 200 mg, oral, Daily  influenza, 0.5 mL, intramuscular, During hospitalization  lisinopril, 20 mg, oral, Daily  pantoprazole, 20 mg, oral, Daily before " breakfast  pneumoc 20-chris conj-dip cr(PF), 0.5 mL, intramuscular, During hospitalization  sennosides-docusate sodium, 1 tablet, oral, Daily      Continuous medications  lactated Ringer's, 50 mL/hr, Last Rate: 50 mL/hr (10/23/24 0028)  lactated Ringer's, 100 mL/hr, Last Rate: 50 mL/hr (10/22/24 1407)  oxygen, 2 L/min      PRN medications  PRN medications: benzocaine-menthol, diphenhydrAMINE, magnesium hydroxide, morphine, naloxone, oxyCODONE, oxyCODONE, oxyCODONE  Results for orders placed or performed during the hospital encounter of 10/22/24 (from the past 24 hours)   CBC   Result Value Ref Range    WBC 13.8 (H) 4.4 - 11.3 x10*3/uL    nRBC 0.0 0.0 - 0.0 /100 WBCs    RBC 2.15 (L) 4.50 - 5.90 x10*6/uL    Hemoglobin 6.9 (L) 13.5 - 17.5 g/dL    Hematocrit 21.9 (L) 41.0 - 52.0 %     (H) 80 - 100 fL    MCH 32.1 26.0 - 34.0 pg    MCHC 31.5 (L) 32.0 - 36.0 g/dL    RDW 14.6 (H) 11.5 - 14.5 %    Platelets 245 150 - 450 x10*3/uL   Basic metabolic panel   Result Value Ref Range    Glucose 140 (H) 74 - 99 mg/dL    Sodium 138 136 - 145 mmol/L    Potassium 4.3 3.5 - 5.3 mmol/L    Chloride 104 98 - 107 mmol/L    Bicarbonate 26 21 - 32 mmol/L    Anion Gap 12 10 - 20 mmol/L    Urea Nitrogen 20 6 - 23 mg/dL    Creatinine 0.86 0.50 - 1.30 mg/dL    eGFR >90 >60 mL/min/1.73m*2    Calcium 8.6 8.6 - 10.3 mg/dL   Prepare RBC: 1 Units   Result Value Ref Range    PRODUCT CODE B8949Z92     Unit Number [id number]     Unit ABO A     Unit RH POS     XM INTEP COMP     Dispense Status IS     Blood Expiration Date 10/27/2024 11:59:00 PM EDT     PRODUCT BLOOD TYPE 6200     UNIT VOLUME 350                             Assessment/Plan   Assessment & Plan  Other osteonecrosis, left femur (Multi)    Hypertension    Acute postoperative pain    Status post left hip replacement  S/p left total hip arthroplasty, posterior approach    Plan:  POD #1 s/p left  DEBORAH  Continue PT/OT, WBAT as tolerated  LE with a walker, no pivoting on operating  extremity  Using incentive spirometer   Reviewed am labs  Multimodal pain regimen  Surgical hip dressing to be removed on post op day #7  VTE prophylaxis:   ASA 81MG BID  Continue home lisinopril and pantoprazole  Heaven-colace for bowel regimen  When appropriate, will discharge home in 1-2 days  Follow up with Dr. Garcia  in 2 weeks          I spent 20 minutes in the professional and overall care of this patient.      Tanya Larry, APRN-CNP

## 2024-10-23 NOTE — PROGRESS NOTES
Spiritual Care Visit    Clinical Encounter Type  Visited With: Family, Patient  Routine Visit: Follow-up                             Advance Directives  Advance Directives Reviewed Date: 10/23/24  Advance Directives Reviewed with: Patient, Friend  Advance Directives Completed Date: 10/23/24  Advance Directives Type: Living Will, Power of  for Healthcare              Taxonomy  Intended Effects: Build relationship of care and support, Demonstrate caring and concern, Establish rapport and connectedness  Methods: Offer spiritual/Buddhist support  Interventions: Prayer for healing, Active listening, Ask guided questions

## 2024-10-23 NOTE — PROGRESS NOTES
Physical Therapy    Physical Therapy    Physical Therapy Treatment    Patient Name: Kyle Argueta  MRN: [id number]  Today's Date: 10/23/2024  Time Calculation  Start Time: 0842  Stop Time: 0905  Time Calculation (min): 23 min     4114/4114-A    Assessment/Plan   PT Assessment  PT Assessment Results: Decreased strength, Decreased endurance, Impaired balance, Decreased mobility  End of Session Communication: Bedside nurse  End of Session Patient Position: Up in chair, Alarm on  PT Plan  Inpatient/Swing Bed or Outpatient: Inpatient  PT Plan  Treatment/Interventions: Bed mobility, Transfer training, Gait training, Stair training, Balance training, Strengthening, Therapeutic exercise, Therapeutic activity, Endurance training, Home exercise program  PT Plan: Ongoing PT  PT Frequency: BID  PT Discharge Recommendations: Low intensity level of continued care (pending safety with stairs)  Equipment Recommended upon Discharge: Wheeled walker  PT Recommended Transfer Status: Assist x1  PT - OK to Discharge: Yes     10/23/24 1336   PT  Visit   PT Received On 10/23/24   Response to Previous Treatment Patient with no complaints from previous session.   General   Reason for Referral impaired mobility s/p L THR   Referred By Dr. Argueta   General Comment Patient has just finished blood transfusion is agreeable to PT session   Precautions   Post-Surgical Precautions Left hip precautions   Pain Assessment   Pain Assessment 0-10   0-10 (Numeric) Pain Score 5 - Moderate pain   Pain Type Surgical pain   Pain Location Hip   Pain Orientation Left   Therapeutic Exercise   Therapeutic Exercise Performed Yes   Therapeutic Exercise Activity 1 AP,  QS,  GS,  Heelslides,  LAQ,  Ball squeezes,  Hip Abduction x 20  each B  with emphasis on  L LE   Bed Mobility   Bed Mobility Yes   Bed Mobility 1   Bed Mobility 1 Supine to sitting   Level of Assistance 1 Contact guard   Bed Mobility 2   Bed Mobility  2 Sitting to supine   Level of Assistance 2  Contact guard   Ambulation/Gait Training   Ambulation/Gait Training Performed Yes   Ambulation/Gait Training 1   Surface 1 Level tile   Device 1 Rolling walker   Gait Support Devices Gait belt   Assistance 1 Contact guard;Minimum assistance   Quality of Gait 1 Antalgic;Decreased step length   Comments/Distance (ft) 1 100  (Patient needing cues to slow down, to keep feet inside the walker and  close chair follow for safety this pm as patient has not walked any long distnace in 6 weeks per patient)   Transfers   Transfer Yes   Transfer 1   Technique 1 Sit to stand;Stand to sit   Transfer Device 1 Walker;Gait belt   Transfer Level of Assistance 1 Minimum assistance   Trials/Comments 1 x2   Activity Tolerance   Endurance Tolerates 10 - 20 min exercise with multiple rests   PT Assessment   PT Assessment Results Decreased strength;Decreased endurance;Impaired balance;Decreased mobility   End of Session Communication Bedside nurse   End of Session Patient Position Up in chair;Alarm on   PT Plan   Inpatient/Swing Bed or Outpatient Inpatient     Outcome Measures:  [facility] Basic Mobility  Turning from your back to your side while in a flat bed without using bedrails: A little  Moving from lying on your back to sitting on the side of a flat bed without using bedrails: A little  Moving to and from bed to chair (including a wheelchair): A little  Standing up from a chair using your arms (e.g. wheelchair or bedside chair): A little  To walk in hospital room: A lot  Climbing 3-5 steps with railing: A lot  Basic Mobility - Total Score: 16                             EDUCATION:     Education Documentation  No documentation found.  Education Comments  No comments found.        GOALS:  Encounter Problems       Encounter Problems (Active)       Compromised Skin Integrity       LTG - Patient will be free from infection       Start:  10/22/24            LTG - Patient will maintain/improve skin integrity through proper skin care techniques        Start:  10/22/24            LTG - Patient will demonstrate appropriate pressure relief techniques       Start:  10/22/24            LTG - Patient will demonstrate appropriate skin care techniques       Start:  10/22/24            LTG - Patient will be free from infection       Start:  10/22/24            STG - Patient demonstrates skin care/treatment/dressing change       Start:  10/22/24            STG - Patient will maintain good skin integrity       Start:  10/22/24            STG - Patient exhibits signs of wound healing.       Start:  10/22/24            STG - Patient demonstrates pressure reduction techniques       Start:  10/22/24            STG - Patient demonstrates preventative skin care measures       Start:  10/22/24            Goal 1       Start:  10/22/24            Goal 2       Start:  10/22/24            Goal 3       Start:  10/22/24               PT Problem       Pt will demonstrate mod I for all bed mobility   (Progressing)       Start:  10/22/24    Expected End:  10/29/24            Pt will demonstrate mod I for all transfers with WW  (Progressing)       Start:  10/22/24    Expected End:  10/29/24            Pt will ambulate 150 ft with WW and mod I.  (Progressing)       Start:  10/22/24    Expected End:  10/29/24            Pt will be able to negotiate 3 steps with WW and mod I.  (Progressing)       Start:  10/22/24    Expected End:  10/29/24               Pain - Adult

## 2024-10-23 NOTE — PROGRESS NOTES
Physical Therapy    Physical Therapy    Physical Therapy Treatment    Patient Name: Kyle Argueta  MRN: [id number]  Today's Date: 10/23/2024  Time Calculation  Start Time: 0842  Stop Time: 0905  Time Calculation (min): 23 min     4114/4114-A    Assessment/Plan   PT Assessment  PT Assessment Results: Decreased strength, Decreased endurance, Impaired balance, Decreased mobility  End of Session Communication: Bedside nurse  End of Session Patient Position: Bed, 3 rail up  PT Plan  Inpatient/Swing Bed or Outpatient: Inpatient  PT Plan  Treatment/Interventions: Bed mobility, Transfer training, Gait training, Stair training, Balance training, Strengthening, Therapeutic exercise, Therapeutic activity, Endurance training, Home exercise program  PT Plan: Ongoing PT  PT Frequency: BID  PT Discharge Recommendations: Low intensity level of continued care (pending safety with stairs)  Equipment Recommended upon Discharge: Wheeled walker  PT Recommended Transfer Status: Assist x1  PT - OK to Discharge: Yes     10/23/24 0842   PT  Visit   PT Received On 10/23/24   Response to Previous Treatment Patient with no complaints from previous session.   General   Reason for Referral impaired mobility s/p L THR   Referred By Dr. Argueta   General Comment Patient is awaiting Blood transfusion this am   Precautions   Post-Surgical Precautions Left hip precautions   Pain Assessment   Pain Assessment 0-10   0-10 (Numeric) Pain Score 5 - Moderate pain   Pain Location Hip   Pain Orientation Left   Therapeutic Exercise   Therapeutic Exercise Performed Yes   Therapeutic Exercise Activity 1 AP,  QS,  GS,  Heelslides,  LAQ,  Ball squeezes,  Hip Abduction x 20  each B  with emphasis on  L LE   Bed Mobility   Bed Mobility Yes   Bed Mobility 1   Bed Mobility 1 Supine to sitting   Level of Assistance 1 Contact guard   Bed Mobility 2   Bed Mobility  2 Sitting to supine   Level of Assistance 2 Contact guard   Ambulation/Gait Training    Ambulation/Gait Training Performed Yes   Ambulation/Gait Training 1   Surface 1 Level tile   Device 1 Rolling walker   Gait Support Devices Gait belt   Assistance 1 Contact guard;Minimum assistance   Quality of Gait 1 Antalgic;Decreased step length   Comments/Distance (ft) 1 5 side steps   Transfers   Transfer Yes   Transfer 1   Technique 1 Sit to stand;Stand to sit   Transfer Device 1 Walker;Gait belt   Transfer Level of Assistance 1 Minimum assistance   Activity Tolerance   Endurance Tolerates 10 - 20 min exercise with multiple rests   PT Assessment   PT Assessment Results Decreased strength;Decreased endurance;Impaired balance;Decreased mobility   End of Session Communication Bedside nurse   End of Session Patient Position Bed, 3 rail up   PT Plan   Inpatient/Swing Bed or Outpatient Inpatient     Outcome Measures:  [facility] Basic Mobility  Turning from your back to your side while in a flat bed without using bedrails: A little  Moving from lying on your back to sitting on the side of a flat bed without using bedrails: A little  Moving to and from bed to chair (including a wheelchair): A little  Standing up from a chair using your arms (e.g. wheelchair or bedside chair): A little  To walk in hospital room: A lot  Climbing 3-5 steps with railing: A lot  Basic Mobility - Total Score: 16                             EDUCATION:     Education Documentation  No documentation found.  Education Comments  No comments found.        GOALS:  Encounter Problems       Encounter Problems (Active)       Compromised Skin Integrity       LTG - Patient will be free from infection       Start:  10/22/24            LTG - Patient will maintain/improve skin integrity through proper skin care techniques       Start:  10/22/24            LTG - Patient will demonstrate appropriate pressure relief techniques       Start:  10/22/24            LTG - Patient will demonstrate appropriate skin care techniques       Start:  10/22/24            LTG  - Patient will be free from infection       Start:  10/22/24            STG - Patient demonstrates skin care/treatment/dressing change       Start:  10/22/24            STG - Patient will maintain good skin integrity       Start:  10/22/24            STG - Patient exhibits signs of wound healing.       Start:  10/22/24            STG - Patient demonstrates pressure reduction techniques       Start:  10/22/24            STG - Patient demonstrates preventative skin care measures       Start:  10/22/24            Goal 1       Start:  10/22/24            Goal 2       Start:  10/22/24            Goal 3       Start:  10/22/24               PT Problem       Pt will demonstrate mod I for all bed mobility   (Progressing)       Start:  10/22/24    Expected End:  10/29/24            Pt will demonstrate mod I for all transfers with WW  (Progressing)       Start:  10/22/24    Expected End:  10/29/24            Pt will ambulate 150 ft with WW and mod I.  (Progressing)       Start:  10/22/24    Expected End:  10/29/24            Pt will be able to negotiate 3 steps with WW and mod I.  (Progressing)       Start:  10/22/24    Expected End:  10/29/24               Pain - Adult

## 2024-10-24 VITALS
HEART RATE: 96 BPM | RESPIRATION RATE: 17 BRPM | HEIGHT: 65 IN | WEIGHT: 169.75 LBS | OXYGEN SATURATION: 93 % | BODY MASS INDEX: 28.28 KG/M2 | TEMPERATURE: 99.1 F | DIASTOLIC BLOOD PRESSURE: 57 MMHG | SYSTOLIC BLOOD PRESSURE: 108 MMHG

## 2024-10-24 LAB
ERYTHROCYTE [DISTWIDTH] IN BLOOD BY AUTOMATED COUNT: 17.5 % (ref 11.5–14.5)
HCT VFR BLD AUTO: 23.9 % (ref 41–52)
HGB BLD-MCNC: 7.5 G/DL (ref 13.5–17.5)
HOLD SPECIMEN: NORMAL
MCH RBC QN AUTO: 31.5 PG (ref 26–34)
MCHC RBC AUTO-ENTMCNC: 31.4 G/DL (ref 32–36)
MCV RBC AUTO: 100 FL (ref 80–100)
NRBC BLD-RTO: 0 /100 WBCS (ref 0–0)
PLATELET # BLD AUTO: 203 X10*3/UL (ref 150–450)
RBC # BLD AUTO: 2.38 X10*6/UL (ref 4.5–5.9)
WBC # BLD AUTO: 12.1 X10*3/UL (ref 4.4–11.3)

## 2024-10-24 PROCEDURE — 97110 THERAPEUTIC EXERCISES: CPT | Mod: GP,CQ

## 2024-10-24 PROCEDURE — 99231 SBSQ HOSP IP/OBS SF/LOW 25: CPT | Performed by: PHYSICIAN ASSISTANT

## 2024-10-24 PROCEDURE — 2500000002 HC RX 250 W HCPCS SELF ADMINISTERED DRUGS (ALT 637 FOR MEDICARE OP, ALT 636 FOR OP/ED): Mod: MUE | Performed by: ORTHOPAEDIC SURGERY

## 2024-10-24 PROCEDURE — 2500000004 HC RX 250 GENERAL PHARMACY W/ HCPCS (ALT 636 FOR OP/ED): Performed by: ORTHOPAEDIC SURGERY

## 2024-10-24 PROCEDURE — 7100000011 HC EXTENDED STAY RECOVERY HOURLY - NURSING UNIT

## 2024-10-24 PROCEDURE — 85027 COMPLETE CBC AUTOMATED: CPT | Performed by: PHYSICIAN ASSISTANT

## 2024-10-24 PROCEDURE — 2500000001 HC RX 250 WO HCPCS SELF ADMINISTERED DRUGS (ALT 637 FOR MEDICARE OP): Performed by: ORTHOPAEDIC SURGERY

## 2024-10-24 PROCEDURE — 97530 THERAPEUTIC ACTIVITIES: CPT | Mod: GO,CO

## 2024-10-24 PROCEDURE — 97116 GAIT TRAINING THERAPY: CPT | Mod: GP,CQ

## 2024-10-24 PROCEDURE — 36415 COLL VENOUS BLD VENIPUNCTURE: CPT | Performed by: PHYSICIAN ASSISTANT

## 2024-10-24 PROCEDURE — 97535 SELF CARE MNGMENT TRAINING: CPT | Mod: GO,CO

## 2024-10-24 RX ORDER — AMOXICILLIN 250 MG
1 CAPSULE ORAL DAILY
Qty: 30 TABLET | Refills: 0 | Status: SHIPPED | OUTPATIENT
Start: 2024-10-25 | End: 2024-11-24

## 2024-10-24 RX ORDER — OXYCODONE HYDROCHLORIDE 5 MG/1
5 TABLET ORAL EVERY 6 HOURS PRN
Qty: 28 TABLET | Refills: 0 | Status: SHIPPED | OUTPATIENT
Start: 2024-10-24 | End: 2024-10-31

## 2024-10-24 RX ORDER — PANTOPRAZOLE SODIUM 20 MG/1
20 TABLET, DELAYED RELEASE ORAL
Qty: 30 TABLET | Refills: 0 | Status: SHIPPED | OUTPATIENT
Start: 2024-10-25 | End: 2024-11-24

## 2024-10-24 RX ORDER — ACETAMINOPHEN 325 MG/1
650 TABLET ORAL EVERY 6 HOURS SCHEDULED
Qty: 240 TABLET | Refills: 0 | Status: SHIPPED | OUTPATIENT
Start: 2024-10-24 | End: 2024-11-23

## 2024-10-24 RX ORDER — ASPIRIN 81 MG/1
81 TABLET ORAL 2 TIMES DAILY
Qty: 60 TABLET | Refills: 0 | Status: SHIPPED | OUTPATIENT
Start: 2024-10-24 | End: 2024-11-23

## 2024-10-24 ASSESSMENT — COGNITIVE AND FUNCTIONAL STATUS - GENERAL
MOVING TO AND FROM BED TO CHAIR: A LITTLE
CLIMB 3 TO 5 STEPS WITH RAILING: A LOT
TOILETING: A LITTLE
WALKING IN HOSPITAL ROOM: A LITTLE
DAILY ACTIVITIY SCORE: 20
TURNING FROM BACK TO SIDE WHILE IN FLAT BAD: A LITTLE
HELP NEEDED FOR BATHING: A LITTLE
MOBILITY SCORE: 20
DRESSING REGULAR LOWER BODY CLOTHING: A LITTLE
WALKING IN HOSPITAL ROOM: A LITTLE
CLIMB 3 TO 5 STEPS WITH RAILING: A LOT
STANDING UP FROM CHAIR USING ARMS: A LITTLE
MOBILITY SCORE: 17
MOVING TO AND FROM BED TO CHAIR: A LITTLE
DRESSING REGULAR UPPER BODY CLOTHING: A LITTLE
DAILY ACTIVITIY SCORE: 22
HELP NEEDED FOR BATHING: A LITTLE
MOVING FROM LYING ON BACK TO SITTING ON SIDE OF FLAT BED WITH BEDRAILS: A LITTLE
PERSONAL GROOMING: A LITTLE

## 2024-10-24 ASSESSMENT — PAIN SCALES - GENERAL
PAINLEVEL_OUTOF10: 0-NO PAIN
PAINLEVEL_OUTOF10: 0 - NO PAIN
PAINLEVEL_OUTOF10: 3
PAINLEVEL_OUTOF10: 5 - MODERATE PAIN
PAINLEVEL_OUTOF10: 0 - NO PAIN
PAINLEVEL_OUTOF10: 5 - MODERATE PAIN

## 2024-10-24 ASSESSMENT — PAIN - FUNCTIONAL ASSESSMENT
PAIN_FUNCTIONAL_ASSESSMENT: 0-10

## 2024-10-24 ASSESSMENT — ACTIVITIES OF DAILY LIVING (ADL): HOME_MANAGEMENT_TIME_ENTRY: 10

## 2024-10-24 NOTE — CARE PLAN
The patient's goals for the shift include      The clinical goals for the shift include Pt will remain HDS this shift.    Over the shift, the patient did make progress toward the following goals.       Problem: Pain - Adult  Goal: Verbalizes/displays adequate comfort level or baseline comfort level  Outcome: Progressing     Problem: Safety - Adult  Goal: Free from fall injury  Outcome: Progressing     Problem: Discharge Planning  Goal: Discharge to home or other facility with appropriate resources  Outcome: Progressing     Problem: Chronic Conditions and Co-morbidities  Goal: Patient's chronic conditions and co-morbidity symptoms are monitored and maintained or improved  Outcome: Progressing     Problem: Fall/Injury  Goal: Not fall by end of shift  Outcome: Progressing  Goal: Be free from injury by end of the shift  Outcome: Progressing  Goal: Verbalize understanding of personal risk factors for fall in the hospital  Outcome: Progressing  Goal: Verbalize understanding of risk factor reduction measures to prevent injury from fall in the home  Outcome: Progressing  Goal: Use assistive devices by end of the shift  Outcome: Progressing  Goal: Pace activities to prevent fatigue by end of the shift  Outcome: Progressing     Problem: Pain  Goal: Takes deep breaths with improved pain control throughout the shift  Outcome: Progressing  Goal: Turns in bed with improved pain control throughout the shift  Outcome: Progressing  Goal: Walks with improved pain control throughout the shift  Outcome: Progressing  Goal: Performs ADL's with improved pain control throughout shift  Outcome: Progressing  Goal: Participates in PT with improved pain control throughout the shift  Outcome: Progressing  Goal: Free from opioid side effects throughout the shift  Outcome: Progressing  Goal: Free from acute confusion related to pain meds throughout the shift  Outcome: Progressing     Problem: Skin  Goal: Decreased wound size/increased tissue  granulation at next dressing change  Outcome: Progressing  Goal: Participates in plan/prevention/treatment measures  Outcome: Progressing  Goal: Prevent/manage excess moisture  Outcome: Progressing  Goal: Prevent/minimize sheer/friction injuries  Outcome: Progressing  Goal: Promote/optimize nutrition  Outcome: Progressing  Goal: Promote skin healing  Outcome: Progressing     Problem: Hip Replacement Intial Post Op  Goal: Activity/Mobility Safety  Outcome: Progressing  Goal: Treatment Immediate Post Op  Outcome: Progressing  Goal: Treatment  Outcome: Progressing     Problem: Hip Replacement Post Op Day 1  Goal: Activity/Mobility Safety  Outcome: Progressing  Goal: Treatment  Outcome: Progressing     Problem: Hip Replacement Post Op Day 2  Goal: Activity/Mobility Safety  Outcome: Progressing  Goal: Treatment  Outcome: Progressing     Problem: Hip Replacement Post Op Day 3  Goal: Activity/Mobility Safety  Outcome: Progressing  Goal: Treatment  Outcome: Progressing     Problem: Dressings Lower Extremities  Goal: STG - Patient will complete lower body dressing independently with AE and comp strategies   Outcome: Progressing

## 2024-10-24 NOTE — PROGRESS NOTES
Physical Therapy    Physical Therapy    Physical Therapy Treatment    Patient Name: Kyle Argueta  MRN: [id number]  Today's Date: 10/24/2024  Time Calculation  Start Time: 0856  Stop Time: 0936  Time Calculation (min): 40 min     4114/4114-A    Assessment/Plan   PT Assessment  PT Assessment Results: Decreased strength, Decreased endurance, Impaired balance, Decreased mobility  Rehab Prognosis: Good  End of Session Communication: Bedside nurse  End of Session Patient Position: Up in chair, Alarm on  PT Plan  Inpatient/Swing Bed or Outpatient: Inpatient  PT Plan  Treatment/Interventions: Bed mobility, Transfer training, Gait training, Stair training, Balance training, Strengthening, Therapeutic exercise, Therapeutic activity, Endurance training, Home exercise program  PT Plan: Ongoing PT  PT Frequency: BID  PT Discharge Recommendations: Low intensity level of continued care (pending safety with stairs)  Equipment Recommended upon Discharge: Wheeled walker  PT Recommended Transfer Status: Assist x1  PT - OK to Discharge: Yes     10/24/24 0856   PT  Visit   PT Received On 10/24/24   Response to Previous Treatment Patient with no complaints from previous session.   General   Reason for Referral left DEBORAH   Referred By Amol Garcia MD   General Comment Patien tin bed agreeableto PT session   Precautions   Post-Surgical Precautions Left hip precautions   Precautions Comment Patient voiced understanding   Pain Assessment   Pain Assessment 0-10   0-10 (Numeric) Pain Score 3   Pain Location Hip   Pain Orientation Left   Therapeutic Exercise   Therapeutic Exercise Performed Yes   Therapeutic Exercise Activity 1 AP,  QS,  GS,  Heelslides,  LAQ,  Ball squeezes,  Hip Abduction x 20  each B  with emphasis on  L LE   Bed Mobility   Bed Mobility Yes   Bed Mobility 1   Bed Mobility 1 Supine to sitting   Level of Assistance 1 Close supervision   Bed Mobility Comments 1 Patient needing cues tomaintain hip precautions during  tranfer   Ambulation/Gait Training   Ambulation/Gait Training Performed Yes   Ambulation/Gait Training 1   Surface 1 Level tile   Device 1 Rolling walker   Gait Support Devices Gait belt   Assistance 1 Contact guard   Quality of Gait 1 WBOS;Diminished heel strike;Antalgic;Inconsistent stride length   Comments/Distance (ft) 1 90 x 2  (Patient working on smaller steps and erect posture)   Transfers   Transfer Yes   Transfer 1   Technique 1 Sit to stand;Stand to sit   Transfer Device 1 Walker;Gait belt   Transfer Level of Assistance 1 Contact guard   Trials/Comments 1 x3   Stairs   Stairs Yes   Stairs   Rails 1 Right   Device 1 Single point cane   Support Devices 1 Gait belt   Assistance 1 Minimum assistance;Moderate assistance   Comment/Number of Steps 1 3  (Patient has frind who assist him on the stairs)   Activity Tolerance   Endurance Tolerates 10 - 20 min exercise with multiple rests   PT Assessment   PT Assessment Results Decreased strength;Decreased endurance;Impaired balance;Decreased mobility   Rehab Prognosis Good   End of Session Communication Bedside nurse   End of Session Patient Position Up in chair;Alarm on   PT Plan   Inpatient/Swing Bed or Outpatient Inpatient     Outcome Measures:  [facility] Basic Mobility  Turning from your back to your side while in a flat bed without using bedrails: None  Moving from lying on your back to sitting on the side of a flat bed without using bedrails: None  Moving to and from bed to chair (including a wheelchair): A little  Standing up from a chair using your arms (e.g. wheelchair or bedside chair): None  To walk in hospital room: A little  Climbing 3-5 steps with railing: A lot  Basic Mobility - Total Score: 20                             EDUCATION:     Education Documentation  No documentation found.  Education Comments  No comments found.        GOALS:  Encounter Problems       Encounter Problems (Active)       Compromised Skin Integrity       LTG - Patient will be free  from infection       Start:  10/22/24            LTG - Patient will maintain/improve skin integrity through proper skin care techniques       Start:  10/22/24            LTG - Patient will demonstrate appropriate pressure relief techniques       Start:  10/22/24            LTG - Patient will demonstrate appropriate skin care techniques       Start:  10/22/24            LTG - Patient will be free from infection       Start:  10/22/24            STG - Patient demonstrates skin care/treatment/dressing change       Start:  10/22/24            STG - Patient will maintain good skin integrity       Start:  10/22/24            STG - Patient exhibits signs of wound healing.       Start:  10/22/24            STG - Patient demonstrates pressure reduction techniques       Start:  10/22/24            STG - Patient demonstrates preventative skin care measures       Start:  10/22/24            Goal 1       Start:  10/22/24            Goal 2       Start:  10/22/24            Goal 3       Start:  10/22/24               PT Problem       Pt will demonstrate mod I for all bed mobility   (Progressing)       Start:  10/22/24    Expected End:  10/29/24            Pt will demonstrate mod I for all transfers with WW  (Progressing)       Start:  10/22/24    Expected End:  10/29/24            Pt will ambulate 150 ft with WW and mod I.  (Progressing)       Start:  10/22/24    Expected End:  10/29/24            Pt will be able to negotiate 3 steps with WW and mod I.  (Progressing)       Start:  10/22/24    Expected End:  10/29/24               Pain - Adult

## 2024-10-24 NOTE — PROGRESS NOTES
"Kyle Argueta is a 69 y.o. male admitted following left total hip replacement.    Subjective   Transfusion well-tolerated yesterday and patient received additional IV dose of venofer this morning.  No chest pain, nausea, or dyspnea.  He notes that his pain has been well-controlled.  He has been ambulating with a walker and denies lightheadedness.  Plan for discharge home was discussed with, and agreeable to, the patient.       Objective     Alert, oriented x 3, cooperative with examination. Examination of the left lower extremity reveals Mepilex dressing is clean, dry, and intact. Left EHL, plantarflexion, dorsiflexion, ankle inversion and eversion are 4+/5. Able to isometrically fire left quadriceps. Sensory intact light touch in the deep/superficial/common peroneal, saphenous, tibial, sural nerve distributions as well as along the cutaneous distributions of the thigh. DP pulse 2+ with capillary refill less than 2 seconds. Negative Homans' sign.     Last Recorded Vitals  Blood pressure 100/54, pulse 79, temperature 37 °C (98.6 °F), temperature source Temporal, resp. rate 14, height 1.651 m (5' 5\"), weight 77 kg (169 lb 12.1 oz), SpO2 95%.  Intake/Output last 3 Shifts:  I/O last 3 completed shifts:  In: 1840.8 (23.9 mL/kg) [P.O.:360; I.V.:1180.8 (15.3 mL/kg); Blood:300]  Out: 1125 (14.6 mL/kg) [Urine:1125 (0.4 mL/kg/hr)]  Weight: 77 kg     Relevant Results      Scheduled medications  acetaminophen, 975 mg, oral, q8h ISAIAH  aspirin, 81 mg, oral, BID  influenza, 0.5 mL, intramuscular, During hospitalization  iron sucrose, 200 mg, intravenous, Daily  lisinopril, 20 mg, oral, Daily  pantoprazole, 20 mg, oral, Daily before breakfast  pneumoc 20-chris conj-dip cr(PF), 0.5 mL, intramuscular, During hospitalization  sennosides-docusate sodium, 1 tablet, oral, Daily      Continuous medications  oxygen, 2 L/min      PRN medications  PRN medications: benzocaine-menthol, diphenhydrAMINE, magnesium hydroxide, morphine, " naloxone, oxyCODONE, oxyCODONE, oxyCODONE  Results for orders placed or performed during the hospital encounter of 10/22/24 (from the past 24 hours)   Hemoglobin and Hematocrit   Result Value Ref Range    Hemoglobin 7.9 (L) 13.5 - 17.5 g/dL    Hematocrit 24.7 (L) 41.0 - 52.0 %   CBC   Result Value Ref Range    WBC 12.1 (H) 4.4 - 11.3 x10*3/uL    nRBC 0.0 0.0 - 0.0 /100 WBCs    RBC 2.38 (L) 4.50 - 5.90 x10*6/uL    Hemoglobin 7.5 (L) 13.5 - 17.5 g/dL    Hematocrit 23.9 (L) 41.0 - 52.0 %     80 - 100 fL    MCH 31.5 26.0 - 34.0 pg    MCHC 31.4 (L) 32.0 - 36.0 g/dL    RDW 17.5 (H) 11.5 - 14.5 %    Platelets 203 150 - 450 x10*3/uL   Green Top   Result Value Ref Range    Extra Tube Hold for add-ons.                             Assessment/Plan   Assessment & Plan  Acute postoperative pain  Continue scheduled oral acetaminophen, intermittent oxycodone.  Status post left hip replacement  Postoperative day 2 from left total hip replacement: Maintain full weightbearing as tolerated, walker for assistance.  Posterior hip precautions.  Mepilex dressing to remain in place.  Mobilizing well with PT/OT.    Acute postoperative anemia due to expected blood loss  Hemoglobin 7.5 this morning, received 200 mg dose IV Venofer this morning.     GI prophylaxis: PPI, stool softener.    Prophylaxis against venous thromboembolism: Aspirin 81 mg by mouth twice daily.  Continue SCDs and early mobilization.    Disposition: Plan for discharge home with home physical therapy today.  He will follow-up in my office in 5-8 days for postoperative assessment and incision check following left total hip placement.    The patient has confirmed that he has a walker with 5 inch front wheels available for personal use following discharge.    Based on my clinical judgement, the patient was provided with a 7-day prescription for opioid medication at 30 MED, indicated for treatment of acute pain in the setting of recent total hip replacement.  [facility]  report has been run and demonstrated an appropriate time course.  The patient has been provided with counselling pertaining to safe use of opioid medication        Amol Garcia MD

## 2024-10-24 NOTE — PROGRESS NOTES
Physical Therapy    Physical Therapy    Physical Therapy Treatment    Patient Name: Kyle Argueta  MRN: [id number]  Today's Date: 10/24/2024  Time Calculation  Start Time: 1334  Stop Time: 1400  Time Calculation (min): 26 min     4114/4114-A    Assessment/Plan   PT Assessment  PT Assessment Results: Decreased strength, Decreased endurance, Impaired balance, Decreased mobility  Rehab Prognosis: Good  End of Session Communication: Bedside nurse  End of Session Patient Position: Bed, 3 rail up, Alarm on  PT Plan  Inpatient/Swing Bed or Outpatient: Inpatient  PT Plan  Treatment/Interventions: Bed mobility, Transfer training, Gait training, Stair training, Balance training, Strengthening, Therapeutic exercise, Therapeutic activity, Endurance training, Home exercise program  PT Plan: Ongoing PT  PT Frequency: BID  PT Discharge Recommendations: Low intensity level of continued care (pending safety with stairs)  Equipment Recommended upon Discharge: Wheeled walker  PT Recommended Transfer Status: Assist x1  PT - OK to Discharge: Yes     10/24/24 1334   PT  Visit   PT Received On 10/24/24   Response to Previous Treatment Patient with no complaints from previous session.   General   Reason for Referral left DEBORAH   Referred By Amol Garcia MD   General Comment Patien tin bed agreeableto PT session   Precautions   Post-Surgical Precautions Left hip precautions   Precautions Comment Patient voiced understanding   Therapeutic Exercise   Therapeutic Exercise Performed Yes   Therapeutic Exercise Activity 1 AP,  QS,  GS,  Heelslides,  LAQ,  Ball squeezes,  Hip Abduction x 20  each B  with emphasis on  L LE   Bed Mobility   Bed Mobility Yes   Bed Mobility 1   Bed Mobility 1 Supine to sitting   Level of Assistance 1 Close supervision   Ambulation/Gait Training   Ambulation/Gait Training Performed Yes   Ambulation/Gait Training 1   Surface 1 Level tile   Device 1 Rolling walker   Gait Support Devices Gait belt   Assistance 1  Contact guard   Quality of Gait 1 WBOS;Diminished heel strike;Antalgic;Inconsistent stride length   Comments/Distance (ft) 1 75   Transfers   Transfer Yes   Transfer 1   Technique 1 Sit to stand;Stand to sit   Transfer Device 1 Walker;Gait belt   Transfer Level of Assistance 1 Contact guard   Trials/Comments 1 x2   Activity Tolerance   Endurance Tolerates 10 - 20 min exercise with multiple rests   PT Assessment   PT Assessment Results Decreased strength;Decreased endurance;Impaired balance;Decreased mobility   Rehab Prognosis Good   End of Session Communication Bedside nurse   End of Session Patient Position Bed, 3 rail up;Alarm on   PT Plan   Inpatient/Swing Bed or Outpatient Inpatient     Outcome Measures:  [facility] Basic Mobility  Turning from your back to your side while in a flat bed without using bedrails: None  Moving from lying on your back to sitting on the side of a flat bed without using bedrails: None  Moving to and from bed to chair (including a wheelchair): A little  Standing up from a chair using your arms (e.g. wheelchair or bedside chair): None  To walk in hospital room: A little  Climbing 3-5 steps with railing: A lot  Basic Mobility - Total Score: 20                             EDUCATION:     Education Documentation  No documentation found.  Education Comments  No comments found.        GOALS:  Encounter Problems       Encounter Problems (Active)       Compromised Skin Integrity       LTG - Patient will be free from infection       Start:  10/22/24            LTG - Patient will maintain/improve skin integrity through proper skin care techniques       Start:  10/22/24            LTG - Patient will demonstrate appropriate pressure relief techniques       Start:  10/22/24            LTG - Patient will demonstrate appropriate skin care techniques       Start:  10/22/24            LTG - Patient will be free from infection       Start:  10/22/24            STG - Patient demonstrates skin  care/treatment/dressing change       Start:  10/22/24            STG - Patient will maintain good skin integrity       Start:  10/22/24            STG - Patient exhibits signs of wound healing.       Start:  10/22/24            STG - Patient demonstrates pressure reduction techniques       Start:  10/22/24            STG - Patient demonstrates preventative skin care measures       Start:  10/22/24            Goal 1       Start:  10/22/24            Goal 2       Start:  10/22/24            Goal 3       Start:  10/22/24               PT Problem       Pt will demonstrate mod I for all bed mobility   (Progressing)       Start:  10/22/24    Expected End:  10/29/24            Pt will demonstrate mod I for all transfers with WW  (Progressing)       Start:  10/22/24    Expected End:  10/29/24            Pt will ambulate 150 ft with WW and mod I.  (Progressing)       Start:  10/22/24    Expected End:  10/29/24            Pt will be able to negotiate 3 steps with WW and mod I.  (Progressing)       Start:  10/22/24    Expected End:  10/29/24               Pain - Adult

## 2024-10-24 NOTE — DISCHARGE SUMMARY
Discharge Diagnosis  Other osteonecrosis, left femur (Multi)    Issues Requiring Follow-Up  None      Test Results Pending At Discharge  Pending Labs       No current pending labs.            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  Underwent this procedure on 10/22 without complication.  Admitted under extended recovery status for postoperative pain control, physical therapy, and medical management.  Postoperative x-rays demonstrate components in good position without fracture or dislocation.  Hospital course was remarkable for:    -Acute postoperative anemia due to surgical blood loss: [place] hemoglobin 6.9, transfuse 1 unit packed red blood cells, subsequently received 200 mg of IV iron.  Hemoglobin improved to 7.5-7.9 at time of discharge.  Received oral tranexamic acid preoperatively.  No further follow-up, evaluation, or management necessary.  -Prophylaxis against venous thromboembolism: Sequential compression devices utilized, early ambulation encouraged.  Initiated aspirin 81 mg by mouth twice daily on the evening of surgical date and this will be continued for 30 days following discharge.  -Acute postoperative pain: Managed with scheduled acetaminophen, intermittent oxycodone, IV morphine for breakthrough pain.  Will continue scheduled acetaminophen, intermittent oxycodone at discharge.  -Leukocytosis: WBC 12.8-13.8, presumed reactive and physiologic in the setting of recent total hip replacement, perioperative Decadron administration.  No evidence of acute postoperative infection on physical examination.    Evaluated by Physical and Occupational Therapy, progressed appropriately, discharged home in stable condition on postoperative day 2.  Plan for follow-up with Dr. Garcia in 5-8 days for postoperative assessment and incision check.  Mepilex dressing maintained in place.  Posterior hip  precautions pain.    Pertinent Physical Exam At Time of Discharge  Alert, oriented x 3, cooperative with examination. Examination of the left lower extremity reveals Mepilex dressing is clean, dry, and intact. Left EHL, plantarflexion, dorsiflexion, ankle inversion and eversion are 4+/5. Able to isometrically fire left quadriceps. Sensory intact light touch in the deep/superficial/common peroneal, saphenous, tibial, sural nerve distributions as well as along the cutaneous distributions of the thigh. DP pulse 2+ with capillary refill less than 2 seconds. Negative Homans' sign.     Home Medications     Medication List      START taking these medications     acetaminophen 325 mg tablet; Commonly known as: Tylenol; Take 2 tablets   (650 mg) by mouth every 6 hours. Continue on a regular schedule for 30   days.   aspirin 81 mg EC tablet; Take 1 tablet (81 mg) by mouth 2 times a day.   Continue on a regular schedule for 30 days.   oxyCODONE 5 mg immediate release tablet; Commonly known as: Roxicodone;   Take 1 tablet (5 mg) by mouth every 6 hours if needed (pain) for up to 7   days.   pantoprazole 20 mg EC tablet; Commonly known as: ProtoNix; Take 1 tablet   (20 mg) by mouth once daily in the morning. Take before meals. Do not   crush, chew, or split.  Continue on a regular schedule for 30 days.; Start   taking on: October 25, 2024   sennosides-docusate sodium 8.6-50 mg tablet; Commonly known as:   Heaven-Colace; Take 1 tablet by mouth once daily. Continue on a regular   schedule for 30 days.; Start taking on: October 25, 2024     CONTINUE taking these medications     lisinopril 20 mg tablet       Outpatient Follow-Up  No future appointments.    Amol Garcia MD

## 2024-10-24 NOTE — PROGRESS NOTES
"Kyle Argueta is a 69 y.o. male on day 0 of admission presenting with Other osteonecrosis, left femur (Multi).    Subjective   Mr. Argueta describes mild left hip pain this morning. He has been voiding without issues. He is looking forward to working with therapy today. Denies lightheadedness.        Objective     Physical Exam  Vitals reviewed.   HENT:      Head: Normocephalic.      Mouth/Throat:      Mouth: Mucous membranes are moist.      Pharynx: Oropharynx is clear.   Eyes:      Extraocular Movements: Extraocular movements intact.   Cardiovascular:      Rate and Rhythm: Normal rate.   Pulmonary:      Effort: Pulmonary effort is normal.   Abdominal:      Palpations: Abdomen is soft.   Musculoskeletal:      Comments: Left hip dressing is dry and intact.  light touch sensation is intact, ankle dorsiflexion/plantar flexion is intact. DP 2+/2 palpable     Skin:     General: Skin is warm and dry.      Capillary Refill: Capillary refill takes less than 2 seconds.   Neurological:      General: No focal deficit present.      Mental Status: He is alert. Mental status is at baseline.   Psychiatric:         Mood and Affect: Mood normal.         Last Recorded Vitals  Blood pressure 100/54, pulse 79, temperature 37 °C (98.6 °F), temperature source Temporal, resp. rate 14, height 1.651 m (5' 5\"), weight 77 kg (169 lb 12.1 oz), SpO2 95%.  Intake/Output last 3 Shifts:  I/O last 3 completed shifts:  In: 1840.8 (23.9 mL/kg) [P.O.:360; I.V.:1180.8 (15.3 mL/kg); Blood:300]  Out: 1125 (14.6 mL/kg) [Urine:1125 (0.4 mL/kg/hr)]  Weight: 77 kg     Relevant Results      Scheduled medications  acetaminophen, 975 mg, oral, q8h ISAIAH  aspirin, 81 mg, oral, BID  influenza, 0.5 mL, intramuscular, During hospitalization  iron sucrose (Venofer) 200 mg IVPB, 200 mg, intravenous, q24h  lisinopril, 20 mg, oral, Daily  pantoprazole, 20 mg, oral, Daily before breakfast  pneumoc 20-chris conj-dip cr(PF), 0.5 mL, intramuscular, During " hospitalization  sennosides-docusate sodium, 1 tablet, oral, Daily      Continuous medications  oxygen, 2 L/min      PRN medications  PRN medications: benzocaine-menthol, diphenhydrAMINE, magnesium hydroxide, morphine, naloxone, oxyCODONE, oxyCODONE, oxyCODONE  Results for orders placed or performed during the hospital encounter of 10/22/24 (from the past 24 hours)   Hemoglobin and Hematocrit   Result Value Ref Range    Hemoglobin 7.9 (L) 13.5 - 17.5 g/dL    Hematocrit 24.7 (L) 41.0 - 52.0 %   CBC   Result Value Ref Range    WBC 12.1 (H) 4.4 - 11.3 x10*3/uL    nRBC 0.0 0.0 - 0.0 /100 WBCs    RBC 2.38 (L) 4.50 - 5.90 x10*6/uL    Hemoglobin 7.5 (L) 13.5 - 17.5 g/dL    Hematocrit 23.9 (L) 41.0 - 52.0 %     80 - 100 fL    MCH 31.5 26.0 - 34.0 pg    MCHC 31.4 (L) 32.0 - 36.0 g/dL    RDW 17.5 (H) 11.5 - 14.5 %    Platelets 203 150 - 450 x10*3/uL   Green Top   Result Value Ref Range    Extra Tube Hold for add-ons.                XR pelvis 1-2 views    Result Date: 10/22/2024  Interpreted By:  Christian Angulo, STUDY: XR PELVIS 1-2 VIEWS; 10/22/2024 10:46 am   INDICATION: Signs/Symptoms:Post op hip; Pain   COMPARISON: None available.   ACCESSION NUMBER(S): [id number]   ORDERING CLINICIAN: GABBY ROLDAN   TECHNIQUE: Views: AP   FINDINGS: Expected postoperative changes of left total hip prosthesis. Normal appearance of acetabular and femoral components. No abnormal radiopaque foreign body. Expected postoperative changes of the soft tissues.   Severe degenerative change of the right hip joint.       Expected postoperative changes of left total hip prosthesis.   Signed by: Christian Angulo 10/22/2024 12:16 PM Dictation workstation:   [id number]                Assessment/Plan   Assessment & Plan  Acute postoperative pain  Continue scheduled oral acetaminophen, intermittent oxycodone.  Will  discontinue celecoxib due to anemia.  Status post left hip replacement  S/p left total hip arthroplasty, posterior  approach    Plan:  POD #1 s/p left  DEBORAH  Continue PT/OT, WBAT as tolerated  LE with a walker, no pivoting on operating extremity  Using incentive spirometer   Reviewed am labs  Multimodal pain regimen  Surgical hip dressing to be removed on post op day #7  VTE prophylaxis: ASA 81MG BID  Continue home lisinopril and pantoprazole  Heaven-colace for bowel regimen  When appropriate, will discharge home tomorrow  Acute postoperative anemia due to expected blood loss  Hgb 6.9, transfused 1 unit prbcs on 10/23; HGB today is 7.5           I spent 25 minutes in the professional and overall care of this patient.      Carli Gonzalez PA-C

## 2024-10-24 NOTE — PROGRESS NOTES
Occupational Therapy    OT Treatment    Patient Name: Kyle Argueta  MRN: [id number]  Today's Date: 10/24/2024  Time Calculation  Start Time: 0859  Stop Time: 0930  Time Calculation (min): 31 min       4114/4114-A    Assessment:  OT Assessment: Patient demonstrated the ability to manage at home and stated he will have assist from several people as needed.  Patient has been managing lower body dressing prior to surgery using kitchen tongs.  End of Session Communication: Bedside nurse  End of Session Patient Position: Up in chair, Alarm on       Plan:  OT Frequency: Daily     Subjective     Current Problem:  Patient Active Problem List   Diagnosis    Acute renal failure, unspecified acute renal failure type (CMS-HCC)    Other osteonecrosis, left femur (Multi)    Hypertension    Acute postoperative pain    Status post left hip replacement    Acute postoperative anemia due to expected blood loss       General:  OT Received On: 10/24/24  Reason for Referral: left DEBORAH  Referred By: Amol Garcia MD  Prior to Session Communication: Bedside nurse  Patient Position Received: Up in chair (alarm not connected.  Patient stated he knows not to get up on his own.)  Preferred Learning Style:  (demonstration)  General Comment: Patient uncomfortable and carefully repositioning himself in the recliner.    Vital Signs:       Pain:  Pain Assessment  Pain Assessment:  (No voiced complaints)  Pain Interventions:  (Patient stated he is having trouble with his pain. With patient's  permission, spoke with suhail's nurse who then assisted patient.)  Objective      Activities of Daily Living:                LE Dressing  LE Dressing:  (Discussed adaptive equipment for lower body dressing. Patient did not wish to get dressed at this time.  At home patient has been using kitchen tongs as a reacher to assist with donning pants.)       Functional Standing Tolerance:       Bed Mobility/Transfers: Bed Mobility  Bed Mobility: Yes  Bed Mobility  1  Bed Mobility 1: Supine to sitting  Level of Assistance 1: Contact guard  Bed Mobility Comments 1: With verbal cues patient moved to edge of bed with a bit of a struggle.  Once seated edge of bed patient was able to maintain his balance.  Transfers  Transfer: Yes  Transfer 1  Transfer From 1: Sit to  Transfer to 1: Stand  Transfer Device 1: Walker, Gait belt  Trials/Comments 1: Patient stood from edge of bed, CGA, and walked part way to the ortho gym to practice stairs with P.T.A. while this RODRÍGUEZ stood by.   Following seated rest break patient walked most of the way back to his room with a chair follow for safety. (Patient initially wanted to return to the bed but with encouragement and explanation of the benefits of sitting up in the chair was willing to do so.)                Therapy/Activity:               Strength:       Other Activity:       Outcome Measures:[facility] Daily Activity  Putting on and taking off regular lower body clothing: A little  Bathing (including washing, rinsing, drying): A little  Putting on and taking off regular upper body clothing: None  Toileting, which includes using toilet, bedpan or urinal: None  Taking care of personal grooming such as brushing teeth: None  Eating Meals: None  Daily Activity - Total Score: 22  Education Documentation  No documentation found.  Education Comments  No comments found.           EDUCATION:  Education  Individual(s) Educated: Patient  Education Provided: Fall precautons (hip precautions, safety and comp strategies with LB dressing)  Patient Response to Education: Patient/Caregiver Verbalized Understanding of Information    Goals:  Encounter Problems       Encounter Problems (Active)       Dressings Lower Extremities       STG - Patient will complete lower body dressing independently with AE and comp strategies  (Progressing)       Start:  10/23/24    Expected End:  11/06/24               Functional Balance       STG-Patient will be independent with  assistive device dynamic stand task >5 minutes for ADL completion   (Progressing)       Start:  10/23/24    Expected End:  11/06/24               Functional Mobility       STG-Patient will be independent with assistive device functional mobility tasks   (Progressing)       Start:  10/23/24    Expected End:  11/06/24               OT Transfers       STG - Patient will perform car transfers independently demonstrating good safety  (Progressing)       Start:  10/23/24    Expected End:  11/06/24            STG-Patient will be independent with functional transfers demonstrating good safety   (Progressing)       Start:  10/23/24    Expected End:  11/06/24               Safety       STG-Patient will independently verbalize hip precautions with all functional tasks   (Progressing)       Start:  10/23/24    Expected End:  11/06/24

## 2024-10-24 NOTE — ASSESSMENT & PLAN NOTE
Postoperative day 2 from left total hip replacement: Maintain full weightbearing as tolerated, walker for assistance.  Posterior hip precautions.  Mepilex dressing to remain in place.  Mobilizing well with PT/OT.

## 2025-01-20 ENCOUNTER — LAB (OUTPATIENT)
Dept: LAB | Facility: LAB | Age: 70
End: 2025-01-20
Payer: MEDICARE

## 2025-01-20 ENCOUNTER — PRE-ADMISSION TESTING (OUTPATIENT)
Dept: PREADMISSION TESTING | Facility: HOSPITAL | Age: 70
End: 2025-01-20
Payer: MEDICARE

## 2025-01-20 VITALS
HEIGHT: 65 IN | TEMPERATURE: 99 F | DIASTOLIC BLOOD PRESSURE: 82 MMHG | OXYGEN SATURATION: 98 % | HEART RATE: 83 BPM | WEIGHT: 181 LBS | SYSTOLIC BLOOD PRESSURE: 161 MMHG | RESPIRATION RATE: 18 BRPM | BODY MASS INDEX: 30.16 KG/M2

## 2025-01-20 DIAGNOSIS — R73.09 OTHER ABNORMAL GLUCOSE: ICD-10-CM

## 2025-01-20 DIAGNOSIS — Z01.818 PREOP EXAMINATION: ICD-10-CM

## 2025-01-20 DIAGNOSIS — M87.851 OTHER OSTEONECROSIS, RIGHT FEMUR (MULTI): ICD-10-CM

## 2025-01-20 DIAGNOSIS — Z01.818 PREOP EXAMINATION: Primary | ICD-10-CM

## 2025-01-20 LAB
ABO GROUP (TYPE) IN BLOOD: NORMAL
ALBUMIN SERPL BCP-MCNC: 4.7 G/DL (ref 3.4–5)
ALP SERPL-CCNC: 81 U/L (ref 33–136)
ALT SERPL W P-5'-P-CCNC: 17 U/L (ref 10–52)
ANION GAP SERPL CALC-SCNC: 13 MMOL/L (ref 10–20)
ANTIBODY SCREEN: NORMAL
AST SERPL W P-5'-P-CCNC: 18 U/L (ref 9–39)
BB ANTIBODY IDENTIFICATION: NORMAL
BILIRUB SERPL-MCNC: 0.5 MG/DL (ref 0–1.2)
BUN SERPL-MCNC: 21 MG/DL (ref 6–23)
CALCIUM SERPL-MCNC: 9.9 MG/DL (ref 8.6–10.3)
CASE #: NORMAL
CHLORIDE SERPL-SCNC: 102 MMOL/L (ref 98–107)
CO2 SERPL-SCNC: 28 MMOL/L (ref 21–32)
CREAT SERPL-MCNC: 0.95 MG/DL (ref 0.5–1.3)
EGFRCR SERPLBLD CKD-EPI 2021: 87 ML/MIN/1.73M*2
ERYTHROCYTE [DISTWIDTH] IN BLOOD BY AUTOMATED COUNT: 11.9 % (ref 11.5–14.5)
EST. AVERAGE GLUCOSE BLD GHB EST-MCNC: 97 MG/DL
GLUCOSE SERPL-MCNC: 102 MG/DL (ref 74–99)
HBA1C MFR BLD: 5 %
HCT VFR BLD AUTO: 39.9 % (ref 41–52)
HGB BLD-MCNC: 13 G/DL (ref 13.5–17.5)
MCH RBC QN AUTO: 31.1 PG (ref 26–34)
MCHC RBC AUTO-ENTMCNC: 32.6 G/DL (ref 32–36)
MCV RBC AUTO: 96 FL (ref 80–100)
NRBC BLD-RTO: 0 /100 WBCS (ref 0–0)
PLATELET # BLD AUTO: 238 X10*3/UL (ref 150–450)
POTASSIUM SERPL-SCNC: 4.2 MMOL/L (ref 3.5–5.3)
PROT SERPL-MCNC: 7.5 G/DL (ref 6.4–8.2)
RBC # BLD AUTO: 4.18 X10*6/UL (ref 4.5–5.9)
RH FACTOR (ANTIGEN D): NORMAL
SODIUM SERPL-SCNC: 139 MMOL/L (ref 136–145)
WBC # BLD AUTO: 6.3 X10*3/UL (ref 4.4–11.3)

## 2025-01-20 PROCEDURE — 99202 OFFICE O/P NEW SF 15 MIN: CPT

## 2025-01-20 PROCEDURE — 80053 COMPREHEN METABOLIC PANEL: CPT

## 2025-01-20 PROCEDURE — 83036 HEMOGLOBIN GLYCOSYLATED A1C: CPT

## 2025-01-20 PROCEDURE — 85027 COMPLETE CBC AUTOMATED: CPT

## 2025-01-20 PROCEDURE — 86900 BLOOD TYPING SEROLOGIC ABO: CPT

## 2025-01-20 PROCEDURE — 86870 RBC ANTIBODY IDENTIFICATION: CPT

## 2025-01-20 PROCEDURE — 86901 BLOOD TYPING SEROLOGIC RH(D): CPT

## 2025-01-20 PROCEDURE — 36415 COLL VENOUS BLD VENIPUNCTURE: CPT

## 2025-01-20 PROCEDURE — 87081 CULTURE SCREEN ONLY: CPT | Mod: STJLAB

## 2025-01-20 PROCEDURE — 86850 RBC ANTIBODY SCREEN: CPT

## 2025-01-20 RX ORDER — CHLORHEXIDINE GLUCONATE ORAL RINSE 1.2 MG/ML
SOLUTION DENTAL
Qty: 473 ML | Refills: 0 | Status: SHIPPED | OUTPATIENT
Start: 2025-01-20 | End: 2025-01-20

## 2025-01-20 ASSESSMENT — DUKE ACTIVITY SCORE INDEX (DASI)
CAN YOU DO YARD WORK LIKE RAKING LEAVES, WEEDING OR PUSHING A MOWER: NO
CAN YOU HAVE SEXUAL RELATIONS: NO
TOTAL_SCORE: 7.2
CAN YOU DO MODERATE WORK AROUND THE HOUSE LIKE VACUUMING, SWEEPING FLOORS OR CARRYING GROCERIES: NO
CAN YOU TAKE CARE OF YOURSELF (EAT, DRESS, BATHE, OR USE TOILET): YES
DASI METS SCORE: 3.6
CAN YOU WALK INDOORS, SUCH AS AROUND YOUR HOUSE: YES
CAN YOU RUN A SHORT DISTANCE: NO
CAN YOU WALK A BLOCK OR TWO ON LEVEL GROUND: NO
CAN YOU PARTICIPATE IN STRENOUS SPORTS LIKE SWIMMING, SINGLES TENNIS, FOOTBALL, BASKETBALL, OR SKIING: NO
CAN YOU PARTICIPATE IN MODERATE RECREATIONAL ACTIVITIES LIKE GOLF, BOWLING, DANCING, DOUBLES TENNIS OR THROWING A BASEBALL OR FOOTBALL: NO
CAN YOU DO HEAVY WORK AROUND THE HOUSE LIKE SCRUBBING FLOORS OR LIFTING AND MOVING HEAVY FURNITURE: NO
CAN YOU CLIMB A FLIGHT OF STAIRS OR WALK UP A HILL: NO
CAN YOU DO LIGHT WORK AROUND THE HOUSE LIKE DUSTING OR WASHING DISHES: YES

## 2025-01-20 ASSESSMENT — ACTIVITIES OF DAILY LIVING (ADL): ADL_SCORE: 1

## 2025-01-20 ASSESSMENT — PAIN - FUNCTIONAL ASSESSMENT: PAIN_FUNCTIONAL_ASSESSMENT: 0-10

## 2025-01-20 ASSESSMENT — LIFESTYLE VARIABLES: SMOKING_STATUS: NONSMOKER

## 2025-01-20 NOTE — PREPROCEDURE INSTRUCTIONS
Thank you for visiting Preadmission Testing at [facility]. If you have any changes to your health condition, please call the SURGEON's office to alert them and give them details of your symptoms.  STOP all NSAIDS (Ibuprofen, Motrin, Aleve), vitamins, herbals, supplements, and all over the counter medications for 7 days prior to surgery  Tylenol is okay to take up until the morning of surgery for pain or headache if needed         Preoperative Brain Exercises    What are brain exercises?  A brain exercise is any activity that engages your thinking (cognitive) skills.    What types of activities are considered brain exercises?  Jigsaw puzzles, crossword puzzles, word jumble, memory games, word search, and many more.  Many can be found free online or on your phone via a mobile abimbola.    Why should I do brain exercises before my surgery?  More recent research has shown brain exercise before surgery can lower the risk of postoperative delirium (confusion) which can be especially important for older adults.  Patients who did brain exercises for 5 to 10 hours the days before surgery, cut their risk of postoperative delirium in half up to 1 week after surgery.      Preoperative Deep Breathing Exercises    Why it is important to do deep breathing exercises before my surgery?  Deep breathing exercises strengthen your breathing muscles.  This helps you to recover after your surgery and decreases the chance of breathing complications.    How are the deep breathing exercises done?  Sit straight with your back supported.  Breathe in deeply and slowly through your nose. Your lower rib cage should expand and your abdomen may move forward.  Hold that breath for 3 to 5 seconds.  Breathe out through pursed lips, slowly and completely.  Rest and repeat 10 times every hour while awake.  Rest longer if you become dizzy or lightheaded.      Patient and Family Education   Ways You Can Help Prevent Blood Clots     This handout explains some simple  things you can do to help prevent blood clots.      Blood clots are blockages that can form in the body's veins. When a blood clot forms in your deep veins, it may be called a deep vein thrombosis, or DVT for short. Blood clots can happen in any part of the body where blood flows, but they are most common in the arms and legs. If a piece of a blood clot breaks free and travels to the lungs, it is called a pulmonary embolus (PE). A PE can be a very serious problem.      Being in the hospital or having surgery can raise your chances of getting a blood clot because you may not be well enough to move around as much as you normally do.      Ways you can help prevent blood clots in the hospital         Wearing SCDs. SCDs stands for Sequential Compression Devices.   SCDs are special sleeves that wrap around your legs  They attach to a pump that fills them with air to gently squeeze your legs every few minutes.   This helps return the blood in your legs to your heart.   SCDs should only be taken off when walking or bathing.   SCDs may not be comfortable, but they can help save your life.               Wearing compression stockings - if your doctor orders them. These special snug fitting stockings gently squeeze your legs to help blood flow.       Walking. Walking helps move the blood in your legs.   If your doctor says it is ok, try walking the halls at least   5 times a day. Ask us to help you get up, so you don't fall.      Taking any blood thinning medicines your doctor orders.          ©[facility]; 3/23        Ways you can help prevent blood clots at home       Wearing compression stockings - if your doctor orders them. ? Walking - to help move the blood in your legs.       Taking any blood thinning medicines your doctor orders.      Signs of a blood clot or PE      Tell your doctor or nurse know right away if you have of the problems listed below.    If you are at home, seek medical care right away. Call [phone]  for chest pain or problems breathing.          Signs of a blood clot (DVT) - such as pain,  swelling, redness or warmth in your arm or leg      Signs of a pulmonary embolism (PE) - such as chest     pain or feeling short of breath

## 2025-01-20 NOTE — H&P (VIEW-ONLY)
CPM/PAT Evaluation       Name: Kyle Argueta (Kyle Argueta)  /Age: 1955/69 y.o.     In-Person       Chief Complaint: Right hip pain and limited range of motion    HPI  Pleasant 69-year-old male presents with other osteonecrosis, right femur scheduled for arthroplasty, hip, total on 25.  He was diagnosed with avascular necrosis of bilateral hips.  Recently had his left knee replaced.  States that surgery went well.  Endorses severe pain with his right hip.  He has to use a wheelchair due to pain.  Endorses limited range of motion.  Denies recent fever/illness/chills.    Past Medical History:   Diagnosis Date    Alcohol abuse     Anemia     Arthritis     Avascular necrosis of bones of both hips (Multi)     Elevated liver enzymes     Hyperlipidemia     Hypertension        Past Surgical History:   Procedure Laterality Date    HIP ARTHROPLASTY Left     OTHER SURGICAL HISTORY      ORIF left ankle fracture; repair of tibio-fibular syndesmotic       Patient  reports that he is not currently sexually active.    Family History   Problem Relation Name Age of Onset    Cancer Mother      Heart disease Father         No Known Allergies    Prior to Admission medications    Medication Sig Start Date End Date Taking? Authorizing Provider   lisinopril 20 mg tablet Take 1 tablet (20 mg) by mouth once daily.    Historical Provider, MD   pantoprazole (ProtoNix) 20 mg EC tablet Take 1 tablet (20 mg) by mouth once daily in the morning. Take before meals. Do not crush, chew, or split.  Continue on a regular schedule for 30 days. 10/25/24 11/24/24  Amol Garcia MD      Constitutional: Negative for fever, chills, or sweats   ENMT: Negative for nasal discharge, congestion, ear pain, mouth pain, throat pain  Respiratory: Negative for cough, wheezing, shortness of breath   Cardiac: Negative for chest pain, dyspnea on exertion, palpitations   Gastrointestinal: Negative for nausea, vomiting, diarrhea, constipation,  "abdominal pain  Genitourinary: Negative for dysuria, flank pain, frequency, hematuria   Musculoskeletal: Negative for decreased ROM, pain, swelling, weakness   Neurological: Negative for dizziness, confusion, headache  Psychiatric: Negative for mood changes   Skin: Negative for itching, rash, ulcer    Hematologic/Lymph: Negative for bruising, easy bleeding  Allergic/Immunologic: Negative itching, sneezing, swelling      Physical Exam  Vitals reviewed.   Constitutional:       Appearance: Normal appearance.   HENT:      Head: Normocephalic.      Mouth/Throat:      Mouth: Mucous membranes are moist.      Pharynx: Oropharynx is clear.   Eyes:      Pupils: Pupils are equal, round, and reactive to light.   Cardiovascular:      Rate and Rhythm: Normal rate and regular rhythm.      Heart sounds: Normal heart sounds.   Pulmonary:      Effort: Pulmonary effort is normal.      Breath sounds: Normal breath sounds.   Abdominal:      General: Bowel sounds are normal.      Palpations: Abdomen is soft.   Musculoskeletal:         General: Normal range of motion.      Cervical back: Normal range of motion.      Comments: Right hip-limited range of motion. Uses wheelchair for ambulation    Skin:     General: Skin is warm and dry.   Neurological:      General: No focal deficit present.      Mental Status: He is alert and oriented to person, place, and time.   Psychiatric:         Mood and Affect: Mood normal.         Behavior: Behavior normal.          PAT AIRWAY:   Airway:     Mallampati::  II    Neck ROM::  Full   Poor dentition   normal        Testing/Diagnostic:     Patient Specialist/PCP:   PCP: Dr. Miranda at [place]     Visit Vitals  /82   Pulse 83   Temp 37.2 °C (99 °F) (Temporal)   Resp 18   Ht 1.651 m (5' 5\")   Wt 82.1 kg (181 lb)   SpO2 98%   BMI 30.12 kg/m²   Smoking Status Never   BSA 1.94 m²       DASI Risk Score      Flowsheet Row Pre-Admission Testing from 10/9/2024 in [facility]   Can you take " care of yourself (eat, dress, bathe, or use toilet)?  2.75 filed at 10/09/2024 1113   Can you walk indoors, such as around your house? 1.75 filed at 10/09/2024 1113   Can you walk a block or two on level ground?  0 filed at 10/09/2024 1113   Can you climb a flight of stairs or walk up a hill? 0 filed at 10/09/2024 1113   Can you run a short distance? 0 filed at 10/09/2024 1113   Can you do light work around the house like dusting or washing dishes? 0 filed at 10/09/2024 1113   Can you do moderate work around the house like vacuuming, sweeping floors or carrying groceries? 0 filed at 10/09/2024 1113   Can you do heavy work around the house like scrubbing floors or lifting and moving heavy furniture?  0 filed at 10/09/2024 1113   Can you do yard work like raking leaves, weeding or pushing a mower? 0 filed at 10/09/2024 1113   Can you have sexual relations? 0 filed at 10/09/2024 1113   Can you participate in moderate recreational activities like golf, bowling, dancing, doubles tennis or throwing a baseball or football? 0 filed at 10/09/2024 1113   Can you participate in strenous sports like swimming, singles tennis, football, basketball, or skiing? 0 filed at 10/09/2024 1113   DASI SCORE 4.5 filed at 10/09/2024 1113   METS Score (Will be calculated only when all the questions are answered) 3.3 filed at 10/09/2024 1113          Caprini DVT Assessment      Flowsheet Row Pre-Admission Testing from 1/20/2025 in [facility] Admission (Discharged) from 10/22/2024 in [facility] with Amol Garcia MD   DVT Score (IF A SCORE IS NOT CALCULATING, MUST SELECT A BMI TO COMPLETE) 8 filed at 01/20/2025 0849 11 filed at 10/22/2024 0703   Surgical Factors Elective major lower extremity arthroplasty filed at 01/20/2025 0849 Elective major lower extremity arthroplasty, Major surgery planned, lasting 2-3 hours filed at 10/22/2024 0703   BMI (BMI MUST BE CHOSEN) 30 or less filed at 01/20/2025  0849 30 or less filed at 10/22/2024 0703          Modified Frailty Index      Flowsheet Row Pre-Admission Testing from 10/9/2024 in [facility]   Non-independent functional status (problems with dressing, bathing, personal grooming, or cooking) 0 filed at 10/09/2024 1113   History of diabetes mellitus  0 filed at 10/09/2024 1113   History of COPD 0 filed at 10/09/2024 1113   History of CHF No filed at 10/09/2024 1113   History of MI 0 filed at 10/09/2024 1113   History of Percutaneous Coronary Intervention, Cardiac Surgery, or Angina No filed at 10/09/2024 1113   Hypertension requiring the use of medication  0.0909 filed at 10/09/2024 1113   Peripheral vascular disease 0 filed at 10/09/2024 1113   Impaired sensorium (cognitive impairement or loss, clouding, or delirium) 0 filed at 10/09/2024 1113   TIA or CVA withouy residual deficit 0 filed at 10/09/2024 1113   Cerebrovascular accident with deficit 0 filed at 10/09/2024 1113   Modified Frailty Index Calculator .0909 filed at 10/09/2024 1113          CHADS2 Stroke Risk  Current as of 3 minutes ago        N/A 3 to 100%: High Risk   2 to < 3%: Medium Risk   0 to < 2%: Low Risk     Last Change: N/A          This score determines the patient's risk of having a stroke if the patient has atrial fibrillation.        This score is not applicable to this patient. Components are not calculated.          Revised Cardiac Risk Index      Flowsheet Row Pre-Admission Testing from 10/9/2024 in [facility]   High-Risk Surgery (Intraperitoneal, Intrathoracic,Suprainguinal vascular) 0 filed at 10/09/2024 1113   History of ischemic heart disease (History of MI, History of positive exercuse test, Current chest paint considered due to myocardial ischemia, Use of nitrate therapy, ECG with pathological Q Waves) 0 filed at 10/09/2024 1113   History of congestive heart failure (pulmonary edemia, bilateral rales or S3 gallop, Paroxysmal nocturnal dyspnea, CXR  showing pulmonary vascular redistribution) 0 filed at 10/09/2024 1113   History of cerebrovascular disease (Prior TIA or stroke) 0 filed at 10/09/2024 1113   Pre-operative insulin treatment 0 filed at 10/09/2024 1113   Pre-operative creatinine>2 mg/dl 0 filed at 10/09/2024 1113   Revised Cardiac Risk Calculator 0 filed at 10/09/2024 1113          Apfel Simplified Score      Flowsheet Row [facility]   Smoking status 1 filed at 10/09/2024 1113   History of motion sickness or PONV  0 filed at 10/09/2024 1113   Use of postoperative opioids 1 filed at 10/09/2024 1113   Gender - Female 0=No filed at 10/09/2024 1113   Apfel Simplified Score Calculator 2 filed at 10/09/2024 1113          Risk Analysis Index Results This Encounter         10/9/2024  1114             Do you live in a place other than your own home?: 0    When did you begin living in the place you are currently residing?: Greater than one year ago    Any kidney failure, kidney not working well, or seeing a kidney doctor (nephrologist)? If yes, was this for kidney stones or another problem?: 0 No    Any history of chronic (long-term) congestive heart failure (CHF)?: 0 No    Any shortness of breath when resting?: 0 No    In the past five years, have you been diagnosed with or treated for cancer?: No    During the last 3 months has it become difficult for you to remember things or organize your thoughts?: 0 No    Have you lost weight of 10 pounds or more in the past 3 months without trying?: 0 No    Do you have any loss of appetitie?: 0 No    Getting Around (Mobility): 1 Needs help from cane, walker, or scooter    Eatin Can plan and prepare own meals    Toiletin Can use toilet without any help    Personal Hygiene (Bathing, Hand Washing, Changing Clothes): 0 Can shower or bathe without any help    PAREKH Cancer History: Patient does not indicate history of cancer    Total Risk Analysis Index Score  "Without Cancer: 24    Total Risk Analysis Index Score: 24          Stop Bang Score      Flowsheet Row Admission (Discharged) from 10/22/2024 in [facility] with Amol Garcia MD Pre-Admission Testing from 10/9/2024 in [facility]   Do you snore loudly? 1 filed at 10/22/2024 0659 0 filed at 10/09/2024 1111   Do you often feel tired or fatigued after your sleep? 0 filed at 10/22/2024 0659 0 filed at 10/09/2024 1111   Has anyone ever observed you stop breathing in your sleep? 0 filed at 10/22/2024 0659 0 filed at 10/09/2024 1111   Do you have or are you being treated for high blood pressure? 1 filed at 10/22/2024 0659 1 filed at 10/09/2024 1111   Recent BMI (Calculated) 28.3 filed at 10/22/2024 0659 27.6 filed at 10/09/2024 1111   Is BMI greater than 35 kg/m2? 0=No filed at 10/22/2024 0659 0=No filed at 10/09/2024 1111   Age older than 50 years old? 1=Yes filed at 10/22/2024 0659 1=Yes filed at 10/09/2024 1111   Is your neck circumference greater than 17 inches (Male) or 16 inches (Female)? -- 0 filed at 10/09/2024 1111   Gender - Male 1=Yes filed at 10/22/2024 0659 1=Yes filed at 10/09/2024 1111   STOP-BANG Total Score -- 3 filed at 10/09/2024 1111          Prodigy: High Risk  Total Score: 16              Prodigy Age Score      Prodigy Gender Score          ARISCAT Score for Postoperative Pulmonary Complications    No data to display       Bal Perioperative Risk for Myocardial Infarction or Cardiac Arrest (TALON)    No data to display         Assessment and Plan:     Assessment and Plan:     Preop:   OR with Dr. Garcia on 1/27 for right total hip replacment  Labs ordered per anesthesia guidelines. Patient requested mouthwash not be sent to pharmacy as he already has a \"huge bottle\" at home.    EKG on file from 9/9/24. NSR. Left axis deviation.     Reportedly has not drank alcohol or using chewing tobacco since summer of 2024.     Neurologic:   H/O encephalopathy: Had an " episode that required hospitalization in fall of 2024. Was related to urosepsis.   The patient is at an increased risk for post operative delirium secondary to age >/=65 , decrease functional status, and type and duration of surgery . Preoperative brain exercise educational handout provided to patient.  The patient is at an increased risk for perioperative stroke secondary to increased age, HTN, HLD, and general anesthesia.    Cardiac:  Hypertension: Prescribed lisinopril. Does not take daily. Instructed him to continue taking daily unless PCP suggests otherwise. BP stable today as he is in a great deal of pain with his hip  Hyperlipidemia: ?   Duke Activity Status Index (DASI)  DASI Score: 7.2   MET Score: 3.6  Very limited activity due to avascular necrosis of right hip and pain.   RCRI  0 which is 3.9% 30 day risk of MACE (risk for cardiac death, nonfatal myocardial infarction, and nonfactal cardiac arrest)  TALON score which indicates a   0.33% risk of intraoperative or 30-day postoperative MACE    Pulmonary:   STOP-BANG score of  3 . Intermediate  risk of obstructive sleep apnea.   ARISCAT:   26   points which is a intermediate (13.3%) risk of in-hospital post-op pulmonary complications     Endocrine:  Prediabetes: Monitored by PCP  Hemoglobin A1C   Date Value Ref Range Status   09/19/2024 6.7 (H) 4.3 - 5.6 % Final     Comment:     American Diabetes Association guidelines indicate that patients with HgbA1c in the range 5.7-6.4% are at increased risk for development of diabetes, and intervention by lifestyle modification may be beneficial. HgbA1c greater or equal to 6.5% is considered diagnostic of diabetes.        GI/Hepatic:  Elevated liver enzymes: CMP ordered.   Apfel: 2 points 39% risk for post operative N/V    Neuro-muscular:   Avascular necrosis of bones of bilat hips: Reason for upcoming procedure. S/P left THR.     Hematologic:   Anemia: ?. No acute concerns per patient   Caprini score , patient at  risk  for perioperative DVT. Patient provided with VTE education/handout.     Skin check: Patient was instructed to make surgeon aware of any skin changes/concerns prior to surgery.     Anesthesia: No history of anesthesia complications. No anesthesia concerns.    ASA III. NO acute changes in medical history since anesthesia in October, 2024. No anesthesia complications noted.     *See risk scores as previously documented

## 2025-01-20 NOTE — PREPROCEDURE INSTRUCTIONS
Medication List            Accurate as of January 20, 2025  8:54 AM. Always use your most recent med list.                lisinopril 20 mg tablet  Additional Medication Adjustments for Surgery: Other (Comment)  Notes to patient: HOLD any evening dose the night before the day of surgery  HOLD the day of surgery     pantoprazole 20 mg EC tablet  Commonly known as: ProtoNix  Take 1 tablet (20 mg) by mouth once daily in the morning. Take before meals. Do not crush, chew, or split.  Continue on a regular schedule for 30 days.  Medication Adjustments for Surgery: Take/Use as prescribed                                  PRE-OPERATIVE INSTRUCTIONS    You will receive notification one business day prior to your procedure to confirm your arrival time. It is important that you answer your phone and/or check your messages during this time. If you do not hear from the surgery center by 5 pm. the day before your procedure, please call [phone].     Please enter the building through the Outpatient entrance and take the elevator off the lobby to the 2nd floor then check in at the Outpatient Surgery desk on the 2nd floor.    INSTRUCTIONS:  Talk to your surgeon for instructions if you should stop your aspirin, blood thinner, or diabetes medicines.  DO NOT take any multivitamins or over the counter supplements for 7-10 days before surgery.  If not being admitted, you must have an adult immediately available to drive you home after surgery. We also highly recommend you have someone stay with you for the entire day and night of your surgery.  For children having surgery, a parent or legal guardian must accompany them to the surgery center. If this is not possible, please call [phone] to make additional arrangements.  For adults who are unable to consent or make medical decisions for themselves, a legal guardian or Power of  must accompany them to the surgery center. If this is not possible, please call [phone] to  make additional arrangements.  Wear comfortable, loose fitting clothing.  All jewelry and piercings must be removed. If you are unable to remove an item or have a dermal piercing, please be sure to tell the nurse when you arrive for surgery.  Nail polish and make-up must be removed.  Avoid smoking or consuming alcohol for 24 hours before surgery.  To help prevent infection, please take a shower/bath and wash your hair the night before and/or morning of surgery (or follow other specific bathing instructions provided).    Preoperative Fasting Guidelines    Why must I stop eating and drinking near surgery time?  With sedation, food or liquid in your stomach can enter your lungs causing serious complications  Increases nausea and vomiting    When do I need to stop eating and drinking before my surgery?  Do not eat any solid food after midnight the night before your surgery/procedure unless otherwise instructed by your surgeon.   You may have up to 13.5 ounces of clear liquid until TWO hours before your instructed arrival time to the hospital.  This includes water, black tea/coffee, (no milk or cream) apple juice, and electrolyte drinks (Gatorade).   You may chew gum until TWO hours before your surgery/procedure      If applicable, notify your surgeons office immediately of any new skin changes that occur to the surgical limb.      If you have any questions or concerns, please call Pre-Admission Testing at [phone].

## 2025-01-20 NOTE — CPM/PAT H&P
CPM/PAT Evaluation       Name: Kyle Argueta (Kyle Argueta)  /Age: 1955/69 y.o.     In-Person       Chief Complaint: Right hip pain and limited range of motion    HPI  Pleasant 69-year-old male presents with other osteonecrosis, right femur scheduled for arthroplasty, hip, total on 25.  He was diagnosed with avascular necrosis of bilateral hips.  Recently had his left knee replaced.  States that surgery went well.  Endorses severe pain with his right hip.  He has to use a wheelchair due to pain.  Endorses limited range of motion.  Denies recent fever/illness/chills.    Past Medical History:   Diagnosis Date    Alcohol abuse     Anemia     Arthritis     Avascular necrosis of bones of both hips (Multi)     Elevated liver enzymes     Hyperlipidemia     Hypertension        Past Surgical History:   Procedure Laterality Date    HIP ARTHROPLASTY Left     OTHER SURGICAL HISTORY      ORIF left ankle fracture; repair of tibio-fibular syndesmotic       Patient  reports that he is not currently sexually active.    Family History   Problem Relation Name Age of Onset    Cancer Mother      Heart disease Father         No Known Allergies    Prior to Admission medications    Medication Sig Start Date End Date Taking? Authorizing Provider   lisinopril 20 mg tablet Take 1 tablet (20 mg) by mouth once daily.    Historical Provider, MD   pantoprazole (ProtoNix) 20 mg EC tablet Take 1 tablet (20 mg) by mouth once daily in the morning. Take before meals. Do not crush, chew, or split.  Continue on a regular schedule for 30 days. 10/25/24 11/24/24  Amol Garcia MD      Constitutional: Negative for fever, chills, or sweats   ENMT: Negative for nasal discharge, congestion, ear pain, mouth pain, throat pain  Respiratory: Negative for cough, wheezing, shortness of breath   Cardiac: Negative for chest pain, dyspnea on exertion, palpitations   Gastrointestinal: Negative for nausea, vomiting, diarrhea, constipation,  "abdominal pain  Genitourinary: Negative for dysuria, flank pain, frequency, hematuria   Musculoskeletal: Negative for decreased ROM, pain, swelling, weakness   Neurological: Negative for dizziness, confusion, headache  Psychiatric: Negative for mood changes   Skin: Negative for itching, rash, ulcer    Hematologic/Lymph: Negative for bruising, easy bleeding  Allergic/Immunologic: Negative itching, sneezing, swelling      Physical Exam  Vitals reviewed.   Constitutional:       Appearance: Normal appearance.   HENT:      Head: Normocephalic.      Mouth/Throat:      Mouth: Mucous membranes are moist.      Pharynx: Oropharynx is clear.   Eyes:      Pupils: Pupils are equal, round, and reactive to light.   Cardiovascular:      Rate and Rhythm: Normal rate and regular rhythm.      Heart sounds: Normal heart sounds.   Pulmonary:      Effort: Pulmonary effort is normal.      Breath sounds: Normal breath sounds.   Abdominal:      General: Bowel sounds are normal.      Palpations: Abdomen is soft.   Musculoskeletal:         General: Normal range of motion.      Cervical back: Normal range of motion.      Comments: Right hip-limited range of motion. Uses wheelchair for ambulation    Skin:     General: Skin is warm and dry.   Neurological:      General: No focal deficit present.      Mental Status: He is alert and oriented to person, place, and time.   Psychiatric:         Mood and Affect: Mood normal.         Behavior: Behavior normal.          PAT AIRWAY:   Airway:     Mallampati::  II    Neck ROM::  Full   Poor dentition   normal        Testing/Diagnostic:     Patient Specialist/PCP:   PCP: Dr. Miranda at [place]     Visit Vitals  /82   Pulse 83   Temp 37.2 °C (99 °F) (Temporal)   Resp 18   Ht 1.651 m (5' 5\")   Wt 82.1 kg (181 lb)   SpO2 98%   BMI 30.12 kg/m²   Smoking Status Never   BSA 1.94 m²       DASI Risk Score      Flowsheet Row Pre-Admission Testing from 10/9/2024 in [facility]   Can you take " care of yourself (eat, dress, bathe, or use toilet)?  2.75 filed at 10/09/2024 1113   Can you walk indoors, such as around your house? 1.75 filed at 10/09/2024 1113   Can you walk a block or two on level ground?  0 filed at 10/09/2024 1113   Can you climb a flight of stairs or walk up a hill? 0 filed at 10/09/2024 1113   Can you run a short distance? 0 filed at 10/09/2024 1113   Can you do light work around the house like dusting or washing dishes? 0 filed at 10/09/2024 1113   Can you do moderate work around the house like vacuuming, sweeping floors or carrying groceries? 0 filed at 10/09/2024 1113   Can you do heavy work around the house like scrubbing floors or lifting and moving heavy furniture?  0 filed at 10/09/2024 1113   Can you do yard work like raking leaves, weeding or pushing a mower? 0 filed at 10/09/2024 1113   Can you have sexual relations? 0 filed at 10/09/2024 1113   Can you participate in moderate recreational activities like golf, bowling, dancing, doubles tennis or throwing a baseball or football? 0 filed at 10/09/2024 1113   Can you participate in strenous sports like swimming, singles tennis, football, basketball, or skiing? 0 filed at 10/09/2024 1113   DASI SCORE 4.5 filed at 10/09/2024 1113   METS Score (Will be calculated only when all the questions are answered) 3.3 filed at 10/09/2024 1113          Caprini DVT Assessment      Flowsheet Row Pre-Admission Testing from 1/20/2025 in [facility] Admission (Discharged) from 10/22/2024 in [facility] with Amol Garcia MD   DVT Score (IF A SCORE IS NOT CALCULATING, MUST SELECT A BMI TO COMPLETE) 8 filed at 01/20/2025 0849 11 filed at 10/22/2024 0703   Surgical Factors Elective major lower extremity arthroplasty filed at 01/20/2025 0849 Elective major lower extremity arthroplasty, Major surgery planned, lasting 2-3 hours filed at 10/22/2024 0703   BMI (BMI MUST BE CHOSEN) 30 or less filed at 01/20/2025  0849 30 or less filed at 10/22/2024 0703          Modified Frailty Index      Flowsheet Row Pre-Admission Testing from 10/9/2024 in [facility]   Non-independent functional status (problems with dressing, bathing, personal grooming, or cooking) 0 filed at 10/09/2024 1113   History of diabetes mellitus  0 filed at 10/09/2024 1113   History of COPD 0 filed at 10/09/2024 1113   History of CHF No filed at 10/09/2024 1113   History of MI 0 filed at 10/09/2024 1113   History of Percutaneous Coronary Intervention, Cardiac Surgery, or Angina No filed at 10/09/2024 1113   Hypertension requiring the use of medication  0.0909 filed at 10/09/2024 1113   Peripheral vascular disease 0 filed at 10/09/2024 1113   Impaired sensorium (cognitive impairement or loss, clouding, or delirium) 0 filed at 10/09/2024 1113   TIA or CVA withouy residual deficit 0 filed at 10/09/2024 1113   Cerebrovascular accident with deficit 0 filed at 10/09/2024 1113   Modified Frailty Index Calculator .0909 filed at 10/09/2024 1113          CHADS2 Stroke Risk  Current as of 3 minutes ago        N/A 3 to 100%: High Risk   2 to < 3%: Medium Risk   0 to < 2%: Low Risk     Last Change: N/A          This score determines the patient's risk of having a stroke if the patient has atrial fibrillation.        This score is not applicable to this patient. Components are not calculated.          Revised Cardiac Risk Index      Flowsheet Row Pre-Admission Testing from 10/9/2024 in [facility]   High-Risk Surgery (Intraperitoneal, Intrathoracic,Suprainguinal vascular) 0 filed at 10/09/2024 1113   History of ischemic heart disease (History of MI, History of positive exercuse test, Current chest paint considered due to myocardial ischemia, Use of nitrate therapy, ECG with pathological Q Waves) 0 filed at 10/09/2024 1113   History of congestive heart failure (pulmonary edemia, bilateral rales or S3 gallop, Paroxysmal nocturnal dyspnea, CXR  showing pulmonary vascular redistribution) 0 filed at 10/09/2024 1113   History of cerebrovascular disease (Prior TIA or stroke) 0 filed at 10/09/2024 1113   Pre-operative insulin treatment 0 filed at 10/09/2024 1113   Pre-operative creatinine>2 mg/dl 0 filed at 10/09/2024 1113   Revised Cardiac Risk Calculator 0 filed at 10/09/2024 1113          Apfel Simplified Score      Flowsheet Row Pre-Admission Testing from 10/9/2024 in [facility]   Smoking status 1 filed at 10/09/2024 1113   History of motion sickness or PONV  0 filed at 10/09/2024 1113   Use of postoperative opioids 1 filed at 10/09/2024 1113   Gender - Female 0=No filed at 10/09/2024 1113   Apfel Simplified Score Calculator 2 filed at 10/09/2024 1113          Risk Analysis Index Results This Encounter         10/9/2024  1114             Do you live in a place other than your own home?: 0    When did you begin living in the place you are currently residing?: Greater than one year ago    Any kidney failure, kidney not working well, or seeing a kidney doctor (nephrologist)? If yes, was this for kidney stones or another problem?: 0 No    Any history of chronic (long-term) congestive heart failure (CHF)?: 0 No    Any shortness of breath when resting?: 0 No    In the past five years, have you been diagnosed with or treated for cancer?: No    During the last 3 months has it become difficult for you to remember things or organize your thoughts?: 0 No    Have you lost weight of 10 pounds or more in the past 3 months without trying?: 0 No    Do you have any loss of appetitie?: 0 No    Getting Around (Mobility): 1 Needs help from cane, walker, or scooter    Eatin Can plan and prepare own meals    Toiletin Can use toilet without any help    Personal Hygiene (Bathing, Hand Washing, Changing Clothes): 0 Can shower or bathe without any help    PAREKH Cancer History: Patient does not indicate history of cancer    Total Risk Analysis Index Score  "Without Cancer: 24    Total Risk Analysis Index Score: 24          Stop Bang Score      Flowsheet Row Admission (Discharged) from 10/22/2024 in [facility] with Amol Garcia MD Pre-Admission Testing from 10/9/2024 in [facility]   Do you snore loudly? 1 filed at 10/22/2024 0659 0 filed at 10/09/2024 1111   Do you often feel tired or fatigued after your sleep? 0 filed at 10/22/2024 0659 0 filed at 10/09/2024 1111   Has anyone ever observed you stop breathing in your sleep? 0 filed at 10/22/2024 0659 0 filed at 10/09/2024 1111   Do you have or are you being treated for high blood pressure? 1 filed at 10/22/2024 0659 1 filed at 10/09/2024 1111   Recent BMI (Calculated) 28.3 filed at 10/22/2024 0659 27.6 filed at 10/09/2024 1111   Is BMI greater than 35 kg/m2? 0=No filed at 10/22/2024 0659 0=No filed at 10/09/2024 1111   Age older than 50 years old? 1=Yes filed at 10/22/2024 0659 1=Yes filed at 10/09/2024 1111   Is your neck circumference greater than 17 inches (Male) or 16 inches (Female)? -- 0 filed at 10/09/2024 1111   Gender - Male 1=Yes filed at 10/22/2024 0659 1=Yes filed at 10/09/2024 1111   STOP-BANG Total Score -- 3 filed at 10/09/2024 1111          Prodigy: High Risk  Total Score: 16              Prodigy Age Score      Prodigy Gender Score          ARISCAT Score for Postoperative Pulmonary Complications    No data to display       Bal Perioperative Risk for Myocardial Infarction or Cardiac Arrest (TALON)    No data to display         Assessment and Plan:     Assessment and Plan:     Preop:   OR with Dr. Garcia on 1/27 for right total hip replacment  Labs ordered per anesthesia guidelines. Patient requested mouthwash not be sent to pharmacy as he already has a \"huge bottle\" at home.    EKG on file from 9/9/24. NSR. Left axis deviation.     Reportedly has not drank alcohol or using chewing tobacco since summer of 2024.     Neurologic:   H/O encephalopathy: Had an " episode that required hospitalization in fall of 2024. Was related to urosepsis.   The patient is at an increased risk for post operative delirium secondary to age >/=65 , decrease functional status, and type and duration of surgery . Preoperative brain exercise educational handout provided to patient.  The patient is at an increased risk for perioperative stroke secondary to increased age, HTN, HLD, and general anesthesia.    Cardiac:  Hypertension: Prescribed lisinopril. Does not take daily. Instructed him to continue taking daily unless PCP suggests otherwise. BP stable today as he is in a great deal of pain with his hip  Hyperlipidemia: ?   Duke Activity Status Index (DASI)  DASI Score: 7.2   MET Score: 3.6  Very limited activity due to avascular necrosis of right hip and pain.   RCRI  0 which is 3.9% 30 day risk of MACE (risk for cardiac death, nonfatal myocardial infarction, and nonfactal cardiac arrest)  TALON score which indicates a   0.33% risk of intraoperative or 30-day postoperative MACE    Pulmonary:   STOP-BANG score of  3 . Intermediate  risk of obstructive sleep apnea.   ARISCAT:   26   points which is a intermediate (13.3%) risk of in-hospital post-op pulmonary complications     Endocrine:  Prediabetes: Monitored by PCP  Hemoglobin A1C   Date Value Ref Range Status   09/19/2024 6.7 (H) 4.3 - 5.6 % Final     Comment:     American Diabetes Association guidelines indicate that patients with HgbA1c in the range 5.7-6.4% are at increased risk for development of diabetes, and intervention by lifestyle modification may be beneficial. HgbA1c greater or equal to 6.5% is considered diagnostic of diabetes.        GI/Hepatic:  Elevated liver enzymes: CMP ordered.   Apfel: 2 points 39% risk for post operative N/V    Neuro-muscular:   Avascular necrosis of bones of bilat hips: Reason for upcoming procedure. S/P left THR.     Hematologic:   Anemia: ?. No acute concerns per patient   Caprini score , patient at  risk  for perioperative DVT. Patient provided with VTE education/handout.     Skin check: Patient was instructed to make surgeon aware of any skin changes/concerns prior to surgery.     Anesthesia: No history of anesthesia complications. No anesthesia concerns.    ASA III. NO acute changes in medical history since anesthesia in October, 2024. No anesthesia complications noted.     *See risk scores as previously documented

## 2025-01-22 LAB — STAPHYLOCOCCUS SPEC CULT: NORMAL

## 2025-01-27 ENCOUNTER — ANESTHESIA EVENT (OUTPATIENT)
Dept: OPERATING ROOM | Facility: HOSPITAL | Age: 70
End: 2025-01-27
Payer: MEDICARE

## 2025-01-27 ENCOUNTER — HOSPITAL ENCOUNTER (OUTPATIENT)
Facility: HOSPITAL | Age: 70
Discharge: HOME HEALTH | End: 2025-01-28
Attending: ORTHOPAEDIC SURGERY | Admitting: ORTHOPAEDIC SURGERY
Payer: MEDICARE

## 2025-01-27 ENCOUNTER — APPOINTMENT (OUTPATIENT)
Dept: RADIOLOGY | Facility: HOSPITAL | Age: 70
End: 2025-01-27
Payer: MEDICARE

## 2025-01-27 ENCOUNTER — ANESTHESIA (OUTPATIENT)
Dept: OPERATING ROOM | Facility: HOSPITAL | Age: 70
End: 2025-01-27
Payer: MEDICARE

## 2025-01-27 DIAGNOSIS — Z96.641 STATUS POST RIGHT HIP REPLACEMENT: Primary | ICD-10-CM

## 2025-01-27 DIAGNOSIS — G89.18 ACUTE POSTOPERATIVE PAIN: ICD-10-CM

## 2025-01-27 PROBLEM — M87.852 OTHER OSTEONECROSIS, LEFT FEMUR (MULTI): Chronic | Status: RESOLVED | Noted: 2024-10-22 | Resolved: 2025-01-27

## 2025-01-27 PROBLEM — M87.851 OTHER OSTEONECROSIS, RIGHT FEMUR (MULTI): Chronic | Status: ACTIVE | Noted: 2025-01-27

## 2025-01-27 PROBLEM — D62 ACUTE POSTOPERATIVE ANEMIA DUE TO EXPECTED BLOOD LOSS: Status: RESOLVED | Noted: 2024-10-23 | Resolved: 2025-01-27

## 2025-01-27 PROBLEM — N17.9 ACUTE RENAL FAILURE, UNSPECIFIED ACUTE RENAL FAILURE TYPE (CMS-HCC): Status: RESOLVED | Noted: 2024-09-09 | Resolved: 2025-01-27

## 2025-01-27 LAB
ABO GROUP (TYPE) IN BLOOD: NORMAL
ANTIBODY SCREEN: NORMAL
BB ANTIBODY IDENTIFICATION: NORMAL
CASE #: NORMAL
RH FACTOR (ANTIGEN D): NORMAL

## 2025-01-27 PROCEDURE — 2720000007 HC OR 272 NO HCPCS: Mod: MUE | Performed by: ORTHOPAEDIC SURGERY

## 2025-01-27 PROCEDURE — 7100000002 HC RECOVERY ROOM TIME - EACH INCREMENTAL 1 MINUTE: Performed by: ORTHOPAEDIC SURGERY

## 2025-01-27 PROCEDURE — C1713 ANCHOR/SCREW BN/BN,TIS/BN: HCPCS | Performed by: ORTHOPAEDIC SURGERY

## 2025-01-27 PROCEDURE — 86901 BLOOD TYPING SEROLOGIC RH(D): CPT | Performed by: ORTHOPAEDIC SURGERY

## 2025-01-27 PROCEDURE — 7100000011 HC EXTENDED STAY RECOVERY HOURLY - NURSING UNIT

## 2025-01-27 PROCEDURE — 72170 X-RAY EXAM OF PELVIS: CPT

## 2025-01-27 PROCEDURE — 2500000004 HC RX 250 GENERAL PHARMACY W/ HCPCS (ALT 636 FOR OP/ED): Mod: JZ | Performed by: ORTHOPAEDIC SURGERY

## 2025-01-27 PROCEDURE — 7100000001 HC RECOVERY ROOM TIME - INITIAL BASE CHARGE: Performed by: ORTHOPAEDIC SURGERY

## 2025-01-27 PROCEDURE — 2500000005 HC RX 250 GENERAL PHARMACY W/O HCPCS: Performed by: ANESTHESIOLOGY

## 2025-01-27 PROCEDURE — 3700000002 HC GENERAL ANESTHESIA TIME - EACH INCREMENTAL 1 MINUTE: Performed by: ORTHOPAEDIC SURGERY

## 2025-01-27 PROCEDURE — 2500000002 HC RX 250 W HCPCS SELF ADMINISTERED DRUGS (ALT 637 FOR MEDICARE OP, ALT 636 FOR OP/ED): Performed by: ORTHOPAEDIC SURGERY

## 2025-01-27 PROCEDURE — 86922 COMPATIBILITY TEST ANTIGLOB: CPT

## 2025-01-27 PROCEDURE — 97161 PT EVAL LOW COMPLEX 20 MIN: CPT | Mod: GP

## 2025-01-27 PROCEDURE — 2500000005 HC RX 250 GENERAL PHARMACY W/O HCPCS: Performed by: ANESTHESIOLOGIST ASSISTANT

## 2025-01-27 PROCEDURE — 86870 RBC ANTIBODY IDENTIFICATION: CPT

## 2025-01-27 PROCEDURE — 3600000017 HC OR TIME - EACH INCREMENTAL 1 MINUTE - PROCEDURE LEVEL SIX: Performed by: ORTHOPAEDIC SURGERY

## 2025-01-27 PROCEDURE — 72170 X-RAY EXAM OF PELVIS: CPT | Performed by: RADIOLOGY

## 2025-01-27 PROCEDURE — 97110 THERAPEUTIC EXERCISES: CPT | Mod: GP

## 2025-01-27 PROCEDURE — 2500000004 HC RX 250 GENERAL PHARMACY W/ HCPCS (ALT 636 FOR OP/ED): Mod: JZ | Performed by: ANESTHESIOLOGIST ASSISTANT

## 2025-01-27 PROCEDURE — 2500000004 HC RX 250 GENERAL PHARMACY W/ HCPCS (ALT 636 FOR OP/ED): Performed by: ORTHOPAEDIC SURGERY

## 2025-01-27 PROCEDURE — C1776 JOINT DEVICE (IMPLANTABLE): HCPCS | Performed by: ORTHOPAEDIC SURGERY

## 2025-01-27 PROCEDURE — 3600000018 HC OR TIME - INITIAL BASE CHARGE - PROCEDURE LEVEL SIX: Performed by: ORTHOPAEDIC SURGERY

## 2025-01-27 PROCEDURE — 2780000003 HC OR 278 NO HCPCS: Performed by: ORTHOPAEDIC SURGERY

## 2025-01-27 PROCEDURE — 3700000001 HC GENERAL ANESTHESIA TIME - INITIAL BASE CHARGE: Performed by: ORTHOPAEDIC SURGERY

## 2025-01-27 PROCEDURE — 2500000001 HC RX 250 WO HCPCS SELF ADMINISTERED DRUGS (ALT 637 FOR MEDICARE OP): Performed by: PHYSICIAN ASSISTANT

## 2025-01-27 PROCEDURE — 2500000005 HC RX 250 GENERAL PHARMACY W/O HCPCS: Performed by: ORTHOPAEDIC SURGERY

## 2025-01-27 PROCEDURE — 2500000001 HC RX 250 WO HCPCS SELF ADMINISTERED DRUGS (ALT 637 FOR MEDICARE OP): Performed by: ORTHOPAEDIC SURGERY

## 2025-01-27 PROCEDURE — A9999 DME SUPPLY OR ACCESSORY, NOS: HCPCS | Mod: MUE | Performed by: ORTHOPAEDIC SURGERY

## 2025-01-27 PROCEDURE — A27130 PR TOTAL HIP ARTHROPLASTY: Performed by: ANESTHESIOLOGY

## 2025-01-27 PROCEDURE — 36415 COLL VENOUS BLD VENIPUNCTURE: CPT | Performed by: ORTHOPAEDIC SURGERY

## 2025-01-27 PROCEDURE — A27130 PR TOTAL HIP ARTHROPLASTY: Performed by: ANESTHESIOLOGIST ASSISTANT

## 2025-01-27 PROCEDURE — 2500000004 HC RX 250 GENERAL PHARMACY W/ HCPCS (ALT 636 FOR OP/ED): Mod: JZ | Performed by: ANESTHESIOLOGY

## 2025-01-27 DEVICE — PINNACLE HIP SOLUTIONS ALTRX POLYETHYLENE ACETABULAR LINER NEUTRAL 36MM ID 56MM OD
Type: IMPLANTABLE DEVICE | Site: HIP | Status: FUNCTIONAL
Brand: PINNACLE ALTRX

## 2025-01-27 DEVICE — PINNACLE CANCELLOUS BONE SCREW 6.5MM X 20MM
Type: IMPLANTABLE DEVICE | Site: HIP | Status: FUNCTIONAL
Brand: PINNACLE

## 2025-01-27 DEVICE — PINNACLE GRIPTION ACETABULAR SHELL MULTI-HOLE 56MM OD
Type: IMPLANTABLE DEVICE | Site: HIP | Status: FUNCTIONAL
Brand: PINNACLE GRIPTION

## 2025-01-27 DEVICE — ACTIS DUOFIX HIP PROSTHESIS (FEMORAL STEM 12/14 TAPER CEMENTLESS SIZE 5 HIGH COLLAR)  CE
Type: IMPLANTABLE DEVICE | Site: HIP | Status: FUNCTIONAL
Brand: ACTIS

## 2025-01-27 DEVICE — PINNACLE CANCELLOUS BONE SCREW 6.5MM X 15MM
Type: IMPLANTABLE DEVICE | Site: HIP | Status: FUNCTIONAL
Brand: PINNACLE

## 2025-01-27 DEVICE — BIOLOX DELTA CERAMIC FEMORAL HEAD +5.0 36MM DIA 12/14 TAPER
Type: IMPLANTABLE DEVICE | Site: HIP | Status: FUNCTIONAL
Brand: BIOLOX DELTA

## 2025-01-27 RX ORDER — SUCCINYLCHOLINE/SOD CL,ISO/PF 100 MG/5ML
SYRINGE (ML) INTRAVENOUS AS NEEDED
Status: DISCONTINUED | OUTPATIENT
Start: 2025-01-27 | End: 2025-01-27

## 2025-01-27 RX ORDER — PANTOPRAZOLE SODIUM 20 MG/1
20 TABLET, DELAYED RELEASE ORAL
Status: DISCONTINUED | OUTPATIENT
Start: 2025-01-28 | End: 2025-01-28 | Stop reason: HOSPADM

## 2025-01-27 RX ORDER — LIDOCAINE HYDROCHLORIDE 10 MG/ML
0.1 INJECTION, SOLUTION INFILTRATION; PERINEURAL ONCE
Status: DISCONTINUED | OUTPATIENT
Start: 2025-01-27 | End: 2025-01-27 | Stop reason: HOSPADM

## 2025-01-27 RX ORDER — ACETAMINOPHEN 325 MG/1
975 TABLET ORAL ONCE
Status: DISCONTINUED | OUTPATIENT
Start: 2025-01-27 | End: 2025-01-27 | Stop reason: HOSPADM

## 2025-01-27 RX ORDER — ROCURONIUM BROMIDE 50 MG/5 ML
SYRINGE (ML) INTRAVENOUS AS NEEDED
Status: DISCONTINUED | OUTPATIENT
Start: 2025-01-27 | End: 2025-01-27

## 2025-01-27 RX ORDER — MIDAZOLAM HYDROCHLORIDE 1 MG/ML
1 INJECTION, SOLUTION INTRAMUSCULAR; INTRAVENOUS ONCE AS NEEDED
Status: DISCONTINUED | OUTPATIENT
Start: 2025-01-27 | End: 2025-01-27 | Stop reason: HOSPADM

## 2025-01-27 RX ORDER — PHENYLEPHRINE HCL IN 0.9% NACL 1 MG/10 ML
SYRINGE (ML) INTRAVENOUS AS NEEDED
Status: DISCONTINUED | OUTPATIENT
Start: 2025-01-27 | End: 2025-01-27

## 2025-01-27 RX ORDER — CEFAZOLIN SODIUM 2 G/100ML
2 INJECTION, SOLUTION INTRAVENOUS ONCE
Status: COMPLETED | OUTPATIENT
Start: 2025-01-27 | End: 2025-01-27

## 2025-01-27 RX ORDER — FENTANYL CITRATE 50 UG/ML
INJECTION, SOLUTION INTRAMUSCULAR; INTRAVENOUS AS NEEDED
Status: DISCONTINUED | OUTPATIENT
Start: 2025-01-27 | End: 2025-01-27

## 2025-01-27 RX ORDER — HYDROMORPHONE HYDROCHLORIDE 1 MG/ML
1 INJECTION, SOLUTION INTRAMUSCULAR; INTRAVENOUS; SUBCUTANEOUS EVERY 5 MIN PRN
Status: DISCONTINUED | OUTPATIENT
Start: 2025-01-27 | End: 2025-01-27 | Stop reason: HOSPADM

## 2025-01-27 RX ORDER — CELECOXIB 200 MG/1
200 CAPSULE ORAL DAILY
Status: DISCONTINUED | OUTPATIENT
Start: 2025-01-28 | End: 2025-01-28 | Stop reason: HOSPADM

## 2025-01-27 RX ORDER — OXYCODONE HYDROCHLORIDE 5 MG/1
5 TABLET ORAL EVERY 4 HOURS PRN
Status: DISCONTINUED | OUTPATIENT
Start: 2025-01-27 | End: 2025-01-28 | Stop reason: HOSPADM

## 2025-01-27 RX ORDER — ASPIRIN 81 MG/1
81 TABLET ORAL 2 TIMES DAILY
Status: DISCONTINUED | OUTPATIENT
Start: 2025-01-27 | End: 2025-01-28 | Stop reason: HOSPADM

## 2025-01-27 RX ORDER — ONDANSETRON HYDROCHLORIDE 2 MG/ML
4 INJECTION, SOLUTION INTRAVENOUS EVERY 8 HOURS PRN
Status: DISCONTINUED | OUTPATIENT
Start: 2025-01-27 | End: 2025-01-28 | Stop reason: HOSPADM

## 2025-01-27 RX ORDER — NORETHINDRONE AND ETHINYL ESTRADIOL 0.5-0.035
KIT ORAL AS NEEDED
Status: DISCONTINUED | OUTPATIENT
Start: 2025-01-27 | End: 2025-01-27

## 2025-01-27 RX ORDER — SODIUM CHLORIDE, SODIUM LACTATE, POTASSIUM CHLORIDE, CALCIUM CHLORIDE 600; 310; 30; 20 MG/100ML; MG/100ML; MG/100ML; MG/100ML
INJECTION, SOLUTION INTRAVENOUS CONTINUOUS PRN
Status: DISCONTINUED | OUTPATIENT
Start: 2025-01-27 | End: 2025-01-27

## 2025-01-27 RX ORDER — HYDROMORPHONE HYDROCHLORIDE 1 MG/ML
INJECTION, SOLUTION INTRAMUSCULAR; INTRAVENOUS; SUBCUTANEOUS AS NEEDED
Status: DISCONTINUED | OUTPATIENT
Start: 2025-01-27 | End: 2025-01-27

## 2025-01-27 RX ORDER — ONDANSETRON HYDROCHLORIDE 2 MG/ML
4 INJECTION, SOLUTION INTRAVENOUS ONCE AS NEEDED
Status: DISCONTINUED | OUTPATIENT
Start: 2025-01-27 | End: 2025-01-27 | Stop reason: HOSPADM

## 2025-01-27 RX ORDER — NALOXONE HYDROCHLORIDE 0.4 MG/ML
0.2 INJECTION, SOLUTION INTRAMUSCULAR; INTRAVENOUS; SUBCUTANEOUS EVERY 5 MIN PRN
Status: DISCONTINUED | OUTPATIENT
Start: 2025-01-27 | End: 2025-01-28 | Stop reason: HOSPADM

## 2025-01-27 RX ORDER — ONDANSETRON 4 MG/1
4 TABLET, FILM COATED ORAL EVERY 8 HOURS PRN
Status: DISCONTINUED | OUTPATIENT
Start: 2025-01-27 | End: 2025-01-28 | Stop reason: HOSPADM

## 2025-01-27 RX ORDER — CALCIUM CARBONATE 200(500)MG
500 TABLET,CHEWABLE ORAL DAILY
Status: DISCONTINUED | OUTPATIENT
Start: 2025-01-27 | End: 2025-01-28 | Stop reason: HOSPADM

## 2025-01-27 RX ORDER — OXYCODONE HYDROCHLORIDE 5 MG/1
5 TABLET ORAL EVERY 4 HOURS PRN
Status: DISCONTINUED | OUTPATIENT
Start: 2025-01-27 | End: 2025-01-27 | Stop reason: HOSPADM

## 2025-01-27 RX ORDER — OXYCODONE HYDROCHLORIDE 5 MG/1
2.5 TABLET ORAL EVERY 4 HOURS PRN
Status: DISCONTINUED | OUTPATIENT
Start: 2025-01-27 | End: 2025-01-28 | Stop reason: HOSPADM

## 2025-01-27 RX ORDER — ADHESIVE BANDAGE
30 BANDAGE TOPICAL DAILY PRN
Status: DISCONTINUED | OUTPATIENT
Start: 2025-01-27 | End: 2025-01-28 | Stop reason: HOSPADM

## 2025-01-27 RX ORDER — CEFAZOLIN SODIUM 2 G/100ML
2 INJECTION, SOLUTION INTRAVENOUS EVERY 8 HOURS
Status: COMPLETED | OUTPATIENT
Start: 2025-01-27 | End: 2025-01-28

## 2025-01-27 RX ORDER — ACETAMINOPHEN 325 MG/1
975 TABLET ORAL EVERY 8 HOURS SCHEDULED
Status: DISCONTINUED | OUTPATIENT
Start: 2025-01-27 | End: 2025-01-28 | Stop reason: HOSPADM

## 2025-01-27 RX ORDER — PROPOFOL 10 MG/ML
INJECTION, EMULSION INTRAVENOUS AS NEEDED
Status: DISCONTINUED | OUTPATIENT
Start: 2025-01-27 | End: 2025-01-27

## 2025-01-27 RX ORDER — ONDANSETRON HYDROCHLORIDE 2 MG/ML
INJECTION, SOLUTION INTRAVENOUS AS NEEDED
Status: DISCONTINUED | OUTPATIENT
Start: 2025-01-27 | End: 2025-01-27

## 2025-01-27 RX ORDER — ACETAMINOPHEN 325 MG/1
975 TABLET ORAL ONCE
Status: COMPLETED | OUTPATIENT
Start: 2025-01-27 | End: 2025-01-27

## 2025-01-27 RX ORDER — DIPHENHYDRAMINE HYDROCHLORIDE 50 MG/ML
12.5 INJECTION INTRAMUSCULAR; INTRAVENOUS ONCE AS NEEDED
Status: DISCONTINUED | OUTPATIENT
Start: 2025-01-27 | End: 2025-01-27 | Stop reason: HOSPADM

## 2025-01-27 RX ORDER — LISINOPRIL 20 MG/1
20 TABLET ORAL DAILY
Status: DISCONTINUED | OUTPATIENT
Start: 2025-01-28 | End: 2025-01-28 | Stop reason: HOSPADM

## 2025-01-27 RX ORDER — HYDRALAZINE HYDROCHLORIDE 20 MG/ML
5 INJECTION INTRAMUSCULAR; INTRAVENOUS EVERY 30 MIN PRN
Status: DISCONTINUED | OUTPATIENT
Start: 2025-01-27 | End: 2025-01-27 | Stop reason: HOSPADM

## 2025-01-27 RX ORDER — LIDOCAINE HYDROCHLORIDE 20 MG/ML
INJECTION, SOLUTION EPIDURAL; INFILTRATION; INTRACAUDAL; PERINEURAL AS NEEDED
Status: DISCONTINUED | OUTPATIENT
Start: 2025-01-27 | End: 2025-01-27

## 2025-01-27 RX ORDER — MORPHINE SULFATE 2 MG/ML
1 INJECTION, SOLUTION INTRAMUSCULAR; INTRAVENOUS EVERY 2 HOUR PRN
Status: DISCONTINUED | OUTPATIENT
Start: 2025-01-27 | End: 2025-01-28 | Stop reason: HOSPADM

## 2025-01-27 RX ORDER — SODIUM CHLORIDE, SODIUM LACTATE, POTASSIUM CHLORIDE, CALCIUM CHLORIDE 600; 310; 30; 20 MG/100ML; MG/100ML; MG/100ML; MG/100ML
125 INJECTION, SOLUTION INTRAVENOUS CONTINUOUS
Status: DISCONTINUED | OUTPATIENT
Start: 2025-01-27 | End: 2025-01-28

## 2025-01-27 RX ORDER — DEXAMETHASONE SODIUM PHOSPHATE 10 MG/ML
INJECTION INTRAMUSCULAR; INTRAVENOUS AS NEEDED
Status: DISCONTINUED | OUTPATIENT
Start: 2025-01-27 | End: 2025-01-27

## 2025-01-27 RX ORDER — FENTANYL CITRATE 50 UG/ML
12.5 INJECTION, SOLUTION INTRAMUSCULAR; INTRAVENOUS EVERY 5 MIN PRN
Status: DISCONTINUED | OUTPATIENT
Start: 2025-01-27 | End: 2025-01-27 | Stop reason: HOSPADM

## 2025-01-27 RX ORDER — MIDAZOLAM HYDROCHLORIDE 1 MG/ML
INJECTION, SOLUTION INTRAMUSCULAR; INTRAVENOUS AS NEEDED
Status: DISCONTINUED | OUTPATIENT
Start: 2025-01-27 | End: 2025-01-27

## 2025-01-27 RX ORDER — AMOXICILLIN 250 MG
1 CAPSULE ORAL DAILY
Status: DISCONTINUED | OUTPATIENT
Start: 2025-01-28 | End: 2025-01-28 | Stop reason: HOSPADM

## 2025-01-27 RX ORDER — TRANEXAMIC ACID 650 MG/1
1300 TABLET ORAL ONCE
Status: COMPLETED | OUTPATIENT
Start: 2025-01-27 | End: 2025-01-27

## 2025-01-27 RX ORDER — ALBUTEROL SULFATE 0.83 MG/ML
2.5 SOLUTION RESPIRATORY (INHALATION) ONCE AS NEEDED
Status: DISCONTINUED | OUTPATIENT
Start: 2025-01-27 | End: 2025-01-27 | Stop reason: HOSPADM

## 2025-01-27 RX ORDER — NEOSTIGMINE METHYLSULFATE 1 MG/ML
INJECTION INTRAVENOUS AS NEEDED
Status: DISCONTINUED | OUTPATIENT
Start: 2025-01-27 | End: 2025-01-27

## 2025-01-27 RX ORDER — MEPERIDINE HYDROCHLORIDE 50 MG/ML
12.5 INJECTION INTRAMUSCULAR; INTRAVENOUS; SUBCUTANEOUS EVERY 10 MIN PRN
Status: DISCONTINUED | OUTPATIENT
Start: 2025-01-27 | End: 2025-01-27 | Stop reason: HOSPADM

## 2025-01-27 RX ORDER — OXYCODONE HYDROCHLORIDE 10 MG/1
10 TABLET ORAL EVERY 4 HOURS PRN
Status: DISCONTINUED | OUTPATIENT
Start: 2025-01-27 | End: 2025-01-28 | Stop reason: HOSPADM

## 2025-01-27 RX ORDER — SODIUM CHLORIDE, SODIUM LACTATE, POTASSIUM CHLORIDE, CALCIUM CHLORIDE 600; 310; 30; 20 MG/100ML; MG/100ML; MG/100ML; MG/100ML
100 INJECTION, SOLUTION INTRAVENOUS CONTINUOUS
Status: DISCONTINUED | OUTPATIENT
Start: 2025-01-27 | End: 2025-01-27 | Stop reason: HOSPADM

## 2025-01-27 RX ORDER — GLYCOPYRROLATE 0.2 MG/ML
INJECTION INTRAMUSCULAR; INTRAVENOUS AS NEEDED
Status: DISCONTINUED | OUTPATIENT
Start: 2025-01-27 | End: 2025-01-27

## 2025-01-27 RX ADMIN — Medication 30 MG: at 07:53

## 2025-01-27 RX ADMIN — Medication 300 MCG: at 08:24

## 2025-01-27 RX ADMIN — ACETAMINOPHEN 975 MG: 325 TABLET ORAL at 21:12

## 2025-01-27 RX ADMIN — FENTANYL CITRATE 100 MCG: 50 INJECTION, SOLUTION INTRAMUSCULAR; INTRAVENOUS at 07:51

## 2025-01-27 RX ADMIN — MIDAZOLAM 2 MG: 1 INJECTION INTRAMUSCULAR; INTRAVENOUS at 07:46

## 2025-01-27 RX ADMIN — HYDROMORPHONE HYDROCHLORIDE 0.5 MG: 1 INJECTION, SOLUTION INTRAMUSCULAR; INTRAVENOUS; SUBCUTANEOUS at 10:44

## 2025-01-27 RX ADMIN — Medication 2 L/MIN: at 12:30

## 2025-01-27 RX ADMIN — Medication 20 MG: at 07:46

## 2025-01-27 RX ADMIN — Medication 100 MG: at 07:51

## 2025-01-27 RX ADMIN — EPHEDRINE SULFATE 20 MG: 50 INJECTION, SOLUTION INTRAVENOUS at 08:59

## 2025-01-27 RX ADMIN — LIDOCAINE HYDROCHLORIDE 100 MG: 20 INJECTION, SOLUTION EPIDURAL; INFILTRATION; INTRACAUDAL; PERINEURAL at 07:51

## 2025-01-27 RX ADMIN — FENTANYL CITRATE 150 MCG: 50 INJECTION, SOLUTION INTRAMUSCULAR; INTRAVENOUS at 08:20

## 2025-01-27 RX ADMIN — CEFAZOLIN SODIUM 2 G: 2 INJECTION, SOLUTION INTRAVENOUS at 16:16

## 2025-01-27 RX ADMIN — POVIDONE-IODINE 1 APPLICATION: 5 SOLUTION TOPICAL at 06:59

## 2025-01-27 RX ADMIN — NEOSTIGMINE METHYLSULFATE 3 MG: 1 INJECTION INTRAVENOUS at 09:38

## 2025-01-27 RX ADMIN — PROPOFOL 200 MG: 10 INJECTION, EMULSION INTRAVENOUS at 07:51

## 2025-01-27 RX ADMIN — GLYCOPYRROLATE 0.4 MG: 0.2 INJECTION, SOLUTION INTRAMUSCULAR; INTRAVENOUS at 09:38

## 2025-01-27 RX ADMIN — ONDANSETRON 4 MG: 2 INJECTION, SOLUTION INTRAMUSCULAR; INTRAVENOUS at 09:30

## 2025-01-27 RX ADMIN — CALCIUM CARBONATE (ANTACID) CHEW TAB 500 MG 500 MG: 500 CHEW TAB at 17:21

## 2025-01-27 RX ADMIN — EPHEDRINE SULFATE 15 MG: 50 INJECTION, SOLUTION INTRAVENOUS at 08:41

## 2025-01-27 RX ADMIN — Medication 30 MG: at 09:10

## 2025-01-27 RX ADMIN — Medication 200 MCG: at 08:10

## 2025-01-27 RX ADMIN — EPHEDRINE SULFATE 15 MG: 50 INJECTION, SOLUTION INTRAVENOUS at 09:07

## 2025-01-27 RX ADMIN — SODIUM CHLORIDE, POTASSIUM CHLORIDE, SODIUM LACTATE AND CALCIUM CHLORIDE: 600; 310; 30; 20 INJECTION, SOLUTION INTRAVENOUS at 07:46

## 2025-01-27 RX ADMIN — HYDROMORPHONE HYDROCHLORIDE 1 MG: 1 INJECTION, SOLUTION INTRAMUSCULAR; INTRAVENOUS; SUBCUTANEOUS at 09:35

## 2025-01-27 RX ADMIN — Medication 20 MG: at 08:20

## 2025-01-27 RX ADMIN — ACETAMINOPHEN 975 MG: 325 TABLET ORAL at 06:58

## 2025-01-27 RX ADMIN — TRANEXAMIC ACID 1300 MG: 650 TABLET ORAL at 06:58

## 2025-01-27 RX ADMIN — CEFAZOLIN SODIUM 2 G: 2 INJECTION, SOLUTION INTRAVENOUS at 07:59

## 2025-01-27 RX ADMIN — DEXAMETHASONE SODIUM PHOSPHATE 10 MG: 4 INJECTION, SOLUTION INTRAMUSCULAR; INTRAVENOUS at 14:44

## 2025-01-27 RX ADMIN — Medication 150 MCG: at 08:59

## 2025-01-27 RX ADMIN — CEFAZOLIN SODIUM 2 G: 2 INJECTION, SOLUTION INTRAVENOUS at 23:45

## 2025-01-27 RX ADMIN — SODIUM CHLORIDE, POTASSIUM CHLORIDE, SODIUM LACTATE AND CALCIUM CHLORIDE 125 ML/HR: 600; 310; 30; 20 INJECTION, SOLUTION INTRAVENOUS at 12:00

## 2025-01-27 RX ADMIN — DEXAMETHASONE SODIUM PHOSPHATE 10 MG: 10 INJECTION INTRAMUSCULAR; INTRAVENOUS at 07:55

## 2025-01-27 RX ADMIN — HYDROMORPHONE HYDROCHLORIDE 0.5 MG: 1 INJECTION, SOLUTION INTRAMUSCULAR; INTRAVENOUS; SUBCUTANEOUS at 10:52

## 2025-01-27 RX ADMIN — Medication 150 MCG: at 09:07

## 2025-01-27 RX ADMIN — ASPIRIN 81 MG: 81 TABLET, COATED ORAL at 21:12

## 2025-01-27 RX ADMIN — Medication 2 L/MIN: at 10:00

## 2025-01-27 RX ADMIN — Medication 200 MCG: at 08:41

## 2025-01-27 SDOH — SOCIAL STABILITY: SOCIAL INSECURITY: WITHIN THE LAST YEAR, HAVE YOU BEEN AFRAID OF YOUR PARTNER OR EX-PARTNER?: NO

## 2025-01-27 SDOH — ECONOMIC STABILITY: FOOD INSECURITY: WITHIN THE PAST 12 MONTHS, YOU WORRIED THAT YOUR FOOD WOULD RUN OUT BEFORE YOU GOT THE MONEY TO BUY MORE.: NEVER TRUE

## 2025-01-27 SDOH — SOCIAL STABILITY: SOCIAL INSECURITY: ARE YOU OR HAVE YOU BEEN THREATENED OR ABUSED PHYSICALLY, EMOTIONALLY, OR SEXUALLY BY ANYONE?: NO

## 2025-01-27 SDOH — ECONOMIC STABILITY: HOUSING INSECURITY: IN THE PAST 12 MONTHS, HOW MANY TIMES HAVE YOU MOVED WHERE YOU WERE LIVING?: 1

## 2025-01-27 SDOH — ECONOMIC STABILITY: HOUSING INSECURITY: IN THE LAST 12 MONTHS, WAS THERE A TIME WHEN YOU WERE NOT ABLE TO PAY THE MORTGAGE OR RENT ON TIME?: NO

## 2025-01-27 SDOH — SOCIAL STABILITY: SOCIAL INSECURITY
WITHIN THE LAST YEAR, HAVE YOU BEEN KICKED, HIT, SLAPPED, OR OTHERWISE PHYSICALLY HURT BY YOUR PARTNER OR EX-PARTNER?: NO

## 2025-01-27 SDOH — HEALTH STABILITY: MENTAL HEALTH: HOW OFTEN DO YOU HAVE A DRINK CONTAINING ALCOHOL?: NEVER

## 2025-01-27 SDOH — HEALTH STABILITY: MENTAL HEALTH: HOW OFTEN DO YOU HAVE SIX OR MORE DRINKS ON ONE OCCASION?: NEVER

## 2025-01-27 SDOH — SOCIAL STABILITY: SOCIAL INSECURITY: WITHIN THE LAST YEAR, HAVE YOU BEEN HUMILIATED OR EMOTIONALLY ABUSED IN OTHER WAYS BY YOUR PARTNER OR EX-PARTNER?: NO

## 2025-01-27 SDOH — SOCIAL STABILITY: SOCIAL INSECURITY: ABUSE: ADULT

## 2025-01-27 SDOH — ECONOMIC STABILITY: FOOD INSECURITY: WITHIN THE PAST 12 MONTHS, THE FOOD YOU BOUGHT JUST DIDN'T LAST AND YOU DIDN'T HAVE MONEY TO GET MORE.: NEVER TRUE

## 2025-01-27 SDOH — ECONOMIC STABILITY: TRANSPORTATION INSECURITY: IN THE PAST 12 MONTHS, HAS LACK OF TRANSPORTATION KEPT YOU FROM MEDICAL APPOINTMENTS OR FROM GETTING MEDICATIONS?: NO

## 2025-01-27 SDOH — SOCIAL STABILITY: SOCIAL INSECURITY: HAS ANYONE EVER THREATENED TO HURT YOUR FAMILY OR YOUR PETS?: NO

## 2025-01-27 SDOH — SOCIAL STABILITY: SOCIAL INSECURITY
WITHIN THE LAST YEAR, HAVE YOU BEEN RAPED OR FORCED TO HAVE ANY KIND OF SEXUAL ACTIVITY BY YOUR PARTNER OR EX-PARTNER?: NO

## 2025-01-27 SDOH — SOCIAL STABILITY: SOCIAL INSECURITY: DO YOU FEEL ANYONE HAS EXPLOITED OR TAKEN ADVANTAGE OF YOU FINANCIALLY OR OF YOUR PERSONAL PROPERTY?: NO

## 2025-01-27 SDOH — SOCIAL STABILITY: SOCIAL INSECURITY: DOES ANYONE TRY TO KEEP YOU FROM HAVING/CONTACTING OTHER FRIENDS OR DOING THINGS OUTSIDE YOUR HOME?: NO

## 2025-01-27 SDOH — SOCIAL STABILITY: SOCIAL INSECURITY: ARE THERE ANY APPARENT SIGNS OF INJURIES/BEHAVIORS THAT COULD BE RELATED TO ABUSE/NEGLECT?: NO

## 2025-01-27 SDOH — HEALTH STABILITY: MENTAL HEALTH: HOW MANY DRINKS CONTAINING ALCOHOL DO YOU HAVE ON A TYPICAL DAY WHEN YOU ARE DRINKING?: PATIENT DOES NOT DRINK

## 2025-01-27 SDOH — ECONOMIC STABILITY: INCOME INSECURITY: IN THE PAST 12 MONTHS HAS THE ELECTRIC, GAS, OIL, OR WATER COMPANY THREATENED TO SHUT OFF SERVICES IN YOUR HOME?: NO

## 2025-01-27 SDOH — SOCIAL STABILITY: SOCIAL INSECURITY: HAVE YOU HAD ANY THOUGHTS OF HARMING ANYONE ELSE?: NO

## 2025-01-27 SDOH — ECONOMIC STABILITY: HOUSING INSECURITY: AT ANY TIME IN THE PAST 12 MONTHS, WERE YOU HOMELESS OR LIVING IN A SHELTER (INCLUDING NOW)?: NO

## 2025-01-27 SDOH — SOCIAL STABILITY: SOCIAL INSECURITY: HAVE YOU HAD THOUGHTS OF HARMING ANYONE ELSE?: NO

## 2025-01-27 SDOH — SOCIAL STABILITY: SOCIAL INSECURITY: DO YOU FEEL UNSAFE GOING BACK TO THE PLACE WHERE YOU ARE LIVING?: NO

## 2025-01-27 SDOH — ECONOMIC STABILITY: FOOD INSECURITY: HOW HARD IS IT FOR YOU TO PAY FOR THE VERY BASICS LIKE FOOD, HOUSING, MEDICAL CARE, AND HEATING?: NOT HARD AT ALL

## 2025-01-27 SDOH — SOCIAL STABILITY: SOCIAL INSECURITY: WERE YOU ABLE TO COMPLETE ALL THE BEHAVIORAL HEALTH SCREENINGS?: YES

## 2025-01-27 ASSESSMENT — COLUMBIA-SUICIDE SEVERITY RATING SCALE - C-SSRS
2. HAVE YOU ACTUALLY HAD ANY THOUGHTS OF KILLING YOURSELF?: NO
6. HAVE YOU EVER DONE ANYTHING, STARTED TO DO ANYTHING, OR PREPARED TO DO ANYTHING TO END YOUR LIFE?: NO
1. IN THE PAST MONTH, HAVE YOU WISHED YOU WERE DEAD OR WISHED YOU COULD GO TO SLEEP AND NOT WAKE UP?: NO

## 2025-01-27 ASSESSMENT — COGNITIVE AND FUNCTIONAL STATUS - GENERAL
MOVING TO AND FROM BED TO CHAIR: A LITTLE
MOBILITY SCORE: 17
TURNING FROM BACK TO SIDE WHILE IN FLAT BAD: A LITTLE
STANDING UP FROM CHAIR USING ARMS: A LITTLE
MOVING FROM LYING ON BACK TO SITTING ON SIDE OF FLAT BED WITH BEDRAILS: A LITTLE
PATIENT BASELINE BEDBOUND: NO
WALKING IN HOSPITAL ROOM: A LITTLE
CLIMB 3 TO 5 STEPS WITH RAILING: A LOT
TURNING FROM BACK TO SIDE WHILE IN FLAT BAD: A LITTLE
MOBILITY SCORE: 17
DRESSING REGULAR LOWER BODY CLOTHING: A LITTLE
TOILETING: A LITTLE
STANDING UP FROM CHAIR USING ARMS: A LITTLE
DAILY ACTIVITIY SCORE: 20
DRESSING REGULAR UPPER BODY CLOTHING: A LITTLE
MOVING TO AND FROM BED TO CHAIR: A LITTLE
MOVING FROM LYING ON BACK TO SITTING ON SIDE OF FLAT BED WITH BEDRAILS: A LITTLE
HELP NEEDED FOR BATHING: A LITTLE
CLIMB 3 TO 5 STEPS WITH RAILING: A LOT
WALKING IN HOSPITAL ROOM: A LITTLE

## 2025-01-27 ASSESSMENT — LIFESTYLE VARIABLES
SKIP TO QUESTIONS 9-10: 1
HOW OFTEN DO YOU HAVE 6 OR MORE DRINKS ON ONE OCCASION: NEVER
AUDIT-C TOTAL SCORE: 0
HOW OFTEN DO YOU HAVE A DRINK CONTAINING ALCOHOL: NEVER
AUDIT-C TOTAL SCORE: 0
AUDIT-C TOTAL SCORE: 0
SKIP TO QUESTIONS 9-10: 1
HOW MANY STANDARD DRINKS CONTAINING ALCOHOL DO YOU HAVE ON A TYPICAL DAY: PATIENT DOES NOT DRINK

## 2025-01-27 ASSESSMENT — ACTIVITIES OF DAILY LIVING (ADL)
PATIENT'S MEMORY ADEQUATE TO SAFELY COMPLETE DAILY ACTIVITIES?: YES
GROOMING: NEEDS ASSISTANCE
FEEDING YOURSELF: INDEPENDENT
WALKS IN HOME: NEEDS ASSISTANCE
DRESSING YOURSELF: NEEDS ASSISTANCE
BATHING: NEEDS ASSISTANCE
JUDGMENT_ADEQUATE_SAFELY_COMPLETE_DAILY_ACTIVITIES: YES
HEARING - LEFT EAR: FUNCTIONAL
TOILETING: NEEDS ASSISTANCE
ASSISTIVE_DEVICE: WALKER
ADEQUATE_TO_COMPLETE_ADL: YES
HEARING - RIGHT EAR: FUNCTIONAL
LACK_OF_TRANSPORTATION: NO
ADL_ASSISTANCE: INDEPENDENT

## 2025-01-27 ASSESSMENT — PATIENT HEALTH QUESTIONNAIRE - PHQ9
1. LITTLE INTEREST OR PLEASURE IN DOING THINGS: NOT AT ALL
2. FEELING DOWN, DEPRESSED OR HOPELESS: NOT AT ALL
SUM OF ALL RESPONSES TO PHQ9 QUESTIONS 1 & 2: 0

## 2025-01-27 ASSESSMENT — PAIN - FUNCTIONAL ASSESSMENT
PAIN_FUNCTIONAL_ASSESSMENT: 0-10

## 2025-01-27 ASSESSMENT — PAIN SCALES - GENERAL
PAINLEVEL_OUTOF10: 0 - NO PAIN
PAINLEVEL_OUTOF10: 5 - MODERATE PAIN
PAINLEVEL_OUTOF10: 0 - NO PAIN
PAINLEVEL_OUTOF10: 0 - NO PAIN
PAINLEVEL_OUTOF10: 5 - MODERATE PAIN
PAINLEVEL_OUTOF10: 0 - NO PAIN
PAINLEVEL_OUTOF10: 5 - MODERATE PAIN
PAINLEVEL_OUTOF10: 0 - NO PAIN
PAINLEVEL_OUTOF10: 5 - MODERATE PAIN
PAINLEVEL_OUTOF10: 8
PAINLEVEL_OUTOF10: 8

## 2025-01-27 ASSESSMENT — PAIN DESCRIPTION - LOCATION: LOCATION: HIP

## 2025-01-27 NOTE — ANESTHESIA PROCEDURE NOTES
Airway  Date/Time: 1/27/2025 7:53 AM  Urgency: elective    Airway not difficult    Staffing  Performed: MAVIS   Authorized by: Crescencio Chowdhury DO    Performed by: MAVIS Courtney  Patient location during procedure: OR    Indications and Patient Condition  Indications for airway management: anesthesia  Spontaneous Ventilation: absent  Sedation level: deep  Preoxygenated: yes  Patient position: sniffing  MILS maintained throughout  Mask difficulty assessment: 0 - not attempted    Final Airway Details  Final airway type: endotracheal airway      Successful airway: ETT  Cuffed: yes   Successful intubation technique: video laryngoscopy  Facilitating devices/methods: intubating stylet  Endotracheal tube insertion site: oral  Blade: Marissa  Blade size: #4  ETT size (mm): 7.5  Cormack-Lehane Classification: grade I - full view of glottis  Placement verified by: chest auscultation and capnometry   Cuff volume (mL): 6  Measured from: lips  ETT to lips (cm): 22  Number of attempts at approach: 1    Additional Comments  Lips and teeth in pre anesthetic conditions after laryngoscopy

## 2025-01-27 NOTE — ANESTHESIA PREPROCEDURE EVALUATION
Patient: Kyle Argueta    Procedure Information       Date/Time: 01/27/25 0730    Procedure: ARTHROPLASTY, HIP, TOTAL, POSTERIOR APPROACH (Right: Hip)    Location: [facility] OR 10 / Virtual [facility]    Surgeons: Amol Garcia MD            Relevant Problems   Cardiac   (+) Hypertension       Clinical information reviewed:   Tobacco  Allergies  Meds  Problems  Med Hx  Surg Hx   Fam Hx  Soc   Hx        NPO Detail:  NPO/Void Status  Date of Last Liquid: 01/27/25  Time of Last Liquid: 0700  Date of Last Solid: 01/26/25  Time of Last Solid: 1130  Last Intake Type: Clear fluids  Time of Last Void: 0655         Physical Exam    Airway  Mallampati: II  TM distance: >3 FB  Neck ROM: full     Cardiovascular - normal exam     Dental    Pulmonary - normal exam     Abdominal - normal exam             Anesthesia Plan    History of general anesthesia?: yes  History of complications of general anesthesia?: no    ASA 2     general     intravenous induction   Anesthetic plan and risks discussed with patient.  Use of blood products discussed with patient who.    Plan discussed with CRNA.

## 2025-01-27 NOTE — OP NOTE
ARTHROPLASTY, HIP, TOTAL, POSTERIOR APPROACH (R) Operative Note     Date: 2025  OR Location: [facility] OR    Name: Kyle Argueta, : 1955, Age: 69 y.o., MRN: [id number], Sex: male    Diagnosis  Pre-op Diagnosis      * Other osteonecrosis, right femur (Multi) [M87.851] Post-op Diagnosis     * Other osteonecrosis, right femur (Multi) [M87.851]     Procedures  Right total hip replacement    Incision time: 08  Surgeon end time: 935      Surgeons      * Amol Garcia - Primary    Resident/Fellow/Other Assistant:  Surgeons and Role:  * No surgeons found with a matching role *    Staff:   Surgical Assistant:   Circulator: Tyrell  Scrub Person: Víctor  Scrub Person: Jessie Degrotoub Person: Cholo    Anesthesia Staff: Anesthesiologist: Crescencio Chowdhury DO  C-AA: MAVIS Courtney    Procedure Summary  Anesthesia: Anesthesia type not filed in the log.  ASA: II  Estimated Blood Loss: 150 mL  Intra-op Medications:   Administrations occurring from 0730 to 1105 on 25:   Medication Name Total Dose   dexAMETHasone (Decadron) PF injection 10 mg/mL 10 mg   ePHEDrine injection 50 mg   fentaNYL (Sublimaze) injection 50 mcg/mL 250 mcg   glycopyrrolate (Robinul) injection 0.4 mg   HYDROmorphone (Dilaudid) injection 1 mg/mL 1 mg   ketamine injection 50 mg/ 5 mL (10 mg/mL) 50 mg   LR infusion Cannot be calculated   lidocaine PF (Xylocaine-MPF) local injection 2 % 100 mg   midazolam (Versed) injection 1 mg/mL 2 mg   neostigmine (Bloxiverz) 10 mg/10 mL injection 3 mg   ondansetron (Zofran) 2 mg/mL injection 4 mg   phenylephrine 100 mcg/mL syringe 10 mL (prefilled) 1,000 mcg   propofol (Diprivan) injection 10 mg/mL 200 mg   rocuronium (Zemuron) 50 mg/5 mL prefilled syringe 50 mg   succinylcholine PF in sodium chloride IV syringe 100 mg   ceFAZolin (Ancef) 2 g in dextrose (iso)  mL 2 g              Anesthesia Record               Intraprocedure I/O Totals       None           Specimen: No specimens collected               Drains and/or Catheters: * None in log *    Tourniquet Times:         Implants:  Implants       Type Name Action Serial No.      Joint Hip ACETABULAR CUP, MULTI HOLE, SIZE 56MM - [id number] Implanted      Screw SCREW CANCELLOUS 6.5 X 20 - [id number] Implanted      Screw SCREW CANCELL 6.5 X 15 - [id number] Implanted      Joint Hip STEM, ACTIS COLLAR, HIGH, SIZE 5 - [id number] Implanted      Joint Hip LINER, ALTRX, NEURTAL, 36 X 56MM - [id number] Implanted      Joint Hip FEMORAL HEAD, CERAMIC 36 +5 - [id number] Implanted               SURGICAL IMPLANTS:  -[employer] [place] GRIPTION multihole acetabular shell, 56 mm outer diameter with 15/20 mm screws  -[employer] [place] ALT-Rx polyethylene acetabular liner, 36 mm inner diameter, neutral obliquity  -DePuy Actis femoral stem, size 5, high offset, 12/14 taper  -Biolox ceramic head, 36 mm diameter, +5 mm neck length      OPERATIVE INDICATIONS:  The patient is a very pleasant 69 year-old male indicated for right total hip arthroplasty in the setting of 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 The patient has been experiencing significant pain and functional debility which have been refractory to a course of conservative management including activity modification, home flexibility and strengthening, prior physical therapy, nonopioid analgesics, nonsteroidal anti-inflammatories.  The patient has been experiencing difficulty with activities of daily living including ascending and descending stairs, performing self care, and standing or walking for prolonged periods. The risks, benefits, alternatives, and convalescence were discussed with the patient for right total hip arthroplasty. Risks discussed included, but were not limited to, infection, DVT, pulmonary embolism, hematoma formation, wound healing complications, intraoperative and postoperative fracture, postoperative instability including dislocation, apparent or real limb length  discrepancy, cardiopulmonary complications including myocardial infarction and respiratory insufficiency, neurovascular complications including cerebrovascular accident and sciatic/femoral nerve palsy, osteolysis, component wear, aseptic or septic loosening of components, need for further surgery, acute and/or chronic postoperative pain, anesthetic complications, COVID-specific risk, and death. The patient verbalized understanding and agreed to proceed after participating in the process shared decision making. The patient's chart was reviewed for venous thromboembolic and cardiovascular risk factors within 30 days of surgery.    MRSA/MSSA carrier status was negative preoperatively as per nasal swab.  The patient underwent routine nasal decolonization as per institutional protocol.    OPERATIVE TECHNIQUE:  The patient was identified in the preoperative holding area through direct interview, as well as review of chart materials and the patient's identification band. The correct surgical site (right hip) was marked with indelible ink following confirmation with the patient. The patient was transported operative theater following preoperative safety huddle.  General anesthetic was administered.  No Duke catheter was placed. Preoperative antibiotic prophylaxis consisted of Ancef 2 gram IV, which was administered within 30-60 minutes of skin incision.  It was confirmed that the patient had received oral tranexamic acid in the preoperative holding area.    The patient was positioned in lateral decubitus the operative hip facing the ceiling.  All bony prominences were padded and an axillary roll placed.  Position with secured with a peg board apparatus.  The operative extremity was prepped and draped in the usual sterile fashion.    Surgical timeout was performed to confirm patient identity, laterality of the procedure, availability of appropriate implants, and availability of pertinent imaging studies.  A posterolateral  incision was based off the posterior third of the greater trochanter. Sharp dissection was carried down through the subcutaneous layer and all bleeders cauterized. The deep fascial layer was divided in line with its fibers and a Charnley bow retractor was placed under the fascial edges, taking care to palpate and protect the sciatic nerve throughout this process. The hip was internally rotated and a #2 retractor placed under the gluteus medius fibers. Piriformis tendon was identified, released, and tagged the inferior fibers of the gluteus minimus were cauterized and the #2 retractor was repositioned under the minimus. Trapezoidal capsulotomy was performed and the capsule was tagged with additional #5 Ethibond sutures. Accessible portions of the posterior acetabular labrum were excised this point.  The hip was dislocated with a combination of flexion, internal rotation, and adduction. A blunt retractor was placed adjacent to the lesser trochanter. Soft tissue was dissected from the posterior extent of the femoral neck and intertrochanteric region. Femoral neck osteotomy was marked with electrocautery at reference location 6 mm proximal to the lesser trochanter, based on preoperative templating.  A neck cutting guide was utilized to determine the trajectory of the osteotomy, which was then created utilizing a single-sided reciprocating saw. The femoral head was extracted and native diameter was measured at 50-54 mm.    Anterior and posterior acetabular retractors were positioned and an [place] retractor was placed between the gluteus medius fibers and the posterior capsular flap.  This afforded excellent visualization of the acetabulum. Remaining portions of the acetabular labrum were excised with electrocautery. The transverse acetabular ligament was absent so we utilized the inferior extent of the acetabulum as a reference for anteversion portions of the inferior capsule were cauterized to facilitate exposure. The  cotyloid body was excised with electrocautery. Once we had obtained adequate exposure, reaming commenced with a 48 mm reamer which was medialized to within 0.5 mm of the base of the cotyloid fossa. Reaming then progressed sequentially with reamer maintained in 35-40° of abduction and 20-25° of anteversion, to a reamer size of 56 mm, which exposed a circumferential bed of bleeding cancellous bone. The acetabulum was inspected for residual cysts which were bone graft with cancellous allograft.  A 56 mm GRIPTION multihole shell was impacted in 35-40 40° of abduction and 20-25° of anteversion. Appropriate shell position was confirmed utilizing anatomic landmarks, including the anterior acetabular shelf.  The acetabular shell was well approximated against native bone and was secured in position with 2 screws placed in the posterior superior acetabular quadrant.  A trial neutral liner was placed. Anterior, posterior, and inferior acetabular osteophytes were removed with a half-inch curved osteotome and mallet as necessary.    Attention was turned to the proximal femur which was exposed with a proximal femoral elevator and a blunt retractor adjacent to the lesser trochanter. Redundant soft tissue was removed adjacent to the greater trochanter. The box osteotome was passed, followed by the canal reamer and a rat tailed rasp. Broaching commenced with a starter broach, and progressed sequentially to a size 5 broach which provided excellent rotational stability and seated at a level 2 mm proud of the femoral neck osteotomy. The broaches were maintained in 15° of anteversion throughout this process, consistent with the patient's native femoral anatomy. A high offset neck trial was placed with a 36+1.5 mm head. Reduction was achieved with longitudinal traction and manual pressure using a head pusher. With the construct reduced, limb lengths were noted to be unequal with the operative side 2 mm short 2 mm short. Lateral soft  tissue tension was appropriate and longitudinal laxity with shuck test was estimated at 3 mm. Trialing the hip in deep flexion, flexion to 90° with internal rotation to 60°, and external rotation revealed. Based on trialing, the construct was modified to a 36+5 mm head which equalize limb lengths while maintaining optimal lateral soft tissue tension and stability..       Trial components were removed and the wound was irrigated with irrisept solution, then rinsed with sterile saline. The final 36 mm inner diameter acetabular liner with neutral obliquity was secured into the locking mechanism of the acetabular shell. The final size 5 Actis femoral stem with high offset was impacted and seated at the same level as compared with the final broach.  A 36 mm diameter Biolox head with +5 mm neck length was impacted onto the trunnion. Final reduction was performed and we confirmed restoration of limb lengths and maintenance of stability throughout range of motion.    The wound was bathed with surgeon for, then rinsed with sterile saline. All members of the surgical team exchanged their outer gloves.  The piriformis tendon and posterior capsular flap were repaired to the insertion of the gluteus medius tendon on the greater trochanter, using the previously placed #5 Ethibond sutures which were now passed in horizontal mattress fashion using a free needle. The deep fascial layer reapproximated with #1 Vicryl placed in figure-of-eight fashion, then oversewn with a running #1 Stratofix suture. Deep subcutaneous and deep dermal layers were reapproximated with 2-0 Vicryl placed in inverted simple fashion. Skin was closed with 3-0 stratafix.  Surgical mesh was placed over the incision, covered with skin adhesive which was allowed to cure.  Mepilex dressing was placed over the incision. The patient was transferred to the hospital bed after placement of a hip abduction pillow, then subsequently transported to the  PACU in  satisfactory condition.     All sponge and instrument counts were correct at the end of the case.     ASSISTANTS:  No qualified resident was available to assist with the case.    Víctor Lindo PA-C was utilized as first assistant on the case. Duties included, but were not limited to, positioning the patient for surgery, maintenance of retractors to facilitate exposure, assistance with positioning to facilitate final and trial reduction components, superficial and deep layers of wound closure, and application of final dressing.    Lucho Campo was utilized as second assistant on the case. Duties included, but were not limited to, positioning the patient for surgery, maintenance of retractors to facilitate exposure, assistance with positioning to facilitate final and trial reduction components, superficial and deep layers of wound closure, and application of final dressing.     POSTOPERATIVE CARE:  The patient will be allowed to bear full weight as tolerated with a walker for assistance, no pivoting on the operative extremity.  The patient will be evaluated by physical and occupational therapy, and I anticipate that the patient will be a reasonable candidate for discharge home in 1-2 day(s).  For purposes of postoperative antibiotic prophylaxis, the patient will receive intravenous Ancef with weight-based dosing every 8 hours, total antibiotic duration not to exceed 23 hours.  Postoperative DVT chemoprophylaxis will consist of aspirin 81 mg by mouth twice daily, with first dose to be given this evening.    Amol Garcia M.D.  Orthopedic Surgery     Amol Garcia  Phone Number: [phone]

## 2025-01-27 NOTE — DISCHARGE INSTR - OTHER ORDERS
If you have any questions or concerns about your discharge instructions, please call the unit at  and ask to speak with the charge nurse. Thank you for choosing [facility]. It was our pleasure to care for you.

## 2025-01-27 NOTE — ANESTHESIA POSTPROCEDURE EVALUATION
Patient: Kyle Argueta    Procedure Summary       Date: 01/27/25 Room / Location: [facility] OR 10 / [facility] OR    Anesthesia Start: 0745 Anesthesia Stop: 0958    Procedure: ARTHROPLASTY, HIP, TOTAL, POSTERIOR APPROACH (Right: Hip) Diagnosis:       Other osteonecrosis, right femur (Multi)      (M87.851 - Other osteonecrosis, right femur)    Surgeons: Amol Garcia MD Responsible Provider: Crescencio Chowdhury DO    Anesthesia Type: general ASA Status: 2            Anesthesia Type: general    Vitals Value Taken Time   /68 01/27/25 0959   Temp 36.2 01/27/25 0959   Pulse 88 01/27/25 0959   Resp 12 01/27/25 0959   SpO2 97 01/27/25 0959       Anesthesia Post Evaluation    Patient location during evaluation: PACU  Patient participation: complete - patient participated  Level of consciousness: awake and alert  Pain management: satisfactory to patient  Airway patency: patent  Cardiovascular status: acceptable  Respiratory status: acceptable, spontaneous ventilation, nonlabored ventilation and room air  Hydration status: acceptable  Postoperative Nausea and Vomiting: none        No notable events documented.

## 2025-01-27 NOTE — DISCHARGE INSTRUCTIONS
ORTHOPEDIC SURGERY DISCHARGE INSTRUCTIONS:    Attending Orthopaedic Surgeon: Amol Garcia MD    Principal diagnosis: Osteonecrosis of right femoral head with secondary osteoarthritis of right hip    Other diagnoses:  -Acute postoperative pain, acute postoperative anemia due to surgical blood loss, hypotension, hyponatremia    Procedure(s) Performed:  -Right total hip replacement on 9/27    Activity Instructions:  -Full weightbearing as tolerated, with walker or cane for assistance at all times while standing or walking.  -Do not bend right hip past 90 degrees   -Do not cross right thigh over the centerline of your body  -No squatting, kneeling, or crouching.-Keep either foam hip wedge or 2-3 pillows between legs while lying in bed, to prevent scissoring of the thighs.  -No driving until on or after 2/17    Diet:  -Resume usual diet.  -Recommend taking a protein supplement (for example, Ennice Instant Breakfast, Boost, Ensure; Glucerna if diabetic) one serving 3 times daily to decrease swelling and promote incisional healing.    Dressing Care:  -Mepilex dressing to remain in place on right hip and thigh until follow up appointment with Dr. Garcia.  -May shower or sponge bathe with Mepilex dressing in place, then pat dry with towel.  Recommend using shower bench/chair.  -No tub bathing.  -Do not apply lotions, oils, ointments, creams, or powders near dressing.    Medications after Discharge:  -IMPORTANT: Take ASPIRIN 81 mg tablet by mouth TWICE DAILY for prevention of blood clots (thromboembolism).  Continue on a regular schedule for 30 days.    -Take pantoprazole (protonix) 20 mg tablet by mouth once daily, prior to breakfast.  This is an antacid medication to prevent side effects from aspirin therapy.  Continue on a regular schedule for 30 days.  -Take celecoxib (Celebrex) 200 mg capsule by mouth with food once daily to control inflammation/swelling.  Continue on a regular schedule for 30 days.    -Please  refer to medicine reconciliation form for additional medication instructions.    Other Instructions:  -May apply ice pack to right hip and thigh for 20 minutes every 4 hours while awake, as needed to control inflammation/swelling.    Follow-up with Orthopaedic Surgery:  -You will need to be seen by Dr. Garcia in office between 2/3 and 2/6 for postoperative assessment and incision check following right total hip replacement.  Please call [phone] within 2-3 days following discharge from the hospital to schedule, confirm, or modify this appointment.    Amol Garcia MD  Orthopaedic Surgery, Specialist in Total Hip and Knee Replacement and Revision    [facility]  -[id number] [place]  [id number] [place]  [id number]

## 2025-01-27 NOTE — PROGRESS NOTES
Physical Therapy    Physical Therapy Evaluation and Treatment    Patient Name: Kyle Argueta  MRN: [id number]  Today's Date: 1/27/2025   Time Calculation  Start Time: 1445  Stop Time: 1507  Time Calculation (min): 22 min  4127/4127-A    Assessment/Plan   PT Assessment  PT Assessment Results: Decreased strength, Decreased range of motion, Decreased endurance, Impaired balance, Decreased mobility, Pain, Orthopedic restrictions  Rehab Prognosis: Good  Evaluation/Treatment Tolerance: Patient tolerated treatment well  Medical Staff Made Aware: Yes  End of Session Communication: Bedside nurse  Assessment Comment: Pt presents today below baseline level of function and requires continued PT during hospital stay. Pt requires PT at a low intensity level at discharge to maximize functional mobility and safety.    End of Session Patient Position: Up in chair, Alarm on  IP OR SWING BED PT PLAN  Inpatient or Swing Bed: Inpatient  PT Plan  Treatment/Interventions: Bed mobility, Transfer training, Gait training, Balance training, Neuromuscular re-education, Strengthening, Endurance training, Range of motion, Therapeutic exercise, Therapeutic activity, Home exercise program, Stair training  PT Plan: Ongoing PT  PT Frequency: BID  PT Discharge Recommendations: Low intensity level of continued care  Equipment Recommended upon Discharge: Wheeled walker  PT Recommended Transfer Status: Assist x1  PT - OK to Discharge: Yes (To next level of care when cleared by medical team   )    Subjective       General Visit Information:  General  Reason for Referral: recent surgery  Referred By: Amol Garcia MD  Past Medical History Relevant to Rehab: Pt admitted 1/27/25 following completion of right posterior DEBORAH. PMH: HTN, avascular necrosis of bilateral hips, anemia, alcohol abuse  Family/Caregiver Present: No  Prior to Session Communication: Bedside nurse  Patient Position Received: Bed, 3 rail up, Alarm on  Preferred Learning Style:  "verbal  General Comment: Nursing cleared for treatment. Pt required increased encouragement. Pt only agreeable to bed exercises and sitting WOB despite encouragement due to feeling \"wiped\".    Home Living:  Home Living  Type of Home: House  Lives With: Alone  Home Adaptive Equipment: Walker rolling or standard  Home Layout: Two level  Home Access: Stairs to enter with rails  Entrance Stairs-Number of Steps: 3  Bathroom Shower/Tub: Walk-in shower  Bathroom Equipment: Shower chair with back  Home Living Comments: friend will assist at discharge    Prior Level of Function:  Prior Function Per Pt/Caregiver Report  ADL Assistance: Independent  Homemaking Assistance: Independent  Ambulatory Assistance:  (mod I with FWW short distances, was using WC for long distances)    Precautions:  Precautions  LE Weight Bearing Status: Weight Bearing as Tolerated (right LE)  Medical Precautions: Fall precautions  Post-Surgical Precautions:  (posterior hip precautions)       Objective     Pain:  Pain Assessment  Pain Assessment: 0-10  0-10 (Numeric) Pain Score: 0 - No pain  Pain Interventions: Ambulation/increased activity, Repositioned    Cognition:  Cognition  Overall Cognitive Status: Within Functional Limits    General Assessments:      Activity Tolerance  Endurance: Tolerates 10 - 20 min exercise with multiple rests                 Static Sitting Balance  Static Sitting-Comment/Number of Minutes: good  Dynamic Sitting Balance  Dynamic Sitting-Comments: good       Functional Assessments:     Bed Mobility  Bed Mobility: Yes  Bed Mobility 1  Bed Mobility 1: Supine to sitting, Sitting to supine  Level of Assistance 1: Close supervision  Transfers  Transfer: No (pt declined to attempt despite encouragement)  Ambulation/Gait Training  Ambulation/Gait Training Performed: No        Treatment    Pt educated on posterior hip precautions. Cues for safe hand placement with use of FWW for sit<>stand. Instruction provided in 10 ankle pumps, " quad sets, glute sets, and LAQ's right LE in order to maximize strength and circulation. Cues given for proper technique and parameters.       Extremity/Trunk Assessments:        RLE   RLE : Exceptions to WFL  Strength RLE  RLE Overall Strength: Greater than or equal to 3/5 as evidenced by functional mobility  LLE   LLE : Within Functional Limits    Outcome Measures:     [facility] Basic Mobility  Turning from your back to your side while in a flat bed without using bedrails: A little  Moving from lying on your back to sitting on the side of a flat bed without using bedrails: A little  Moving to and from bed to chair (including a wheelchair): A little  Standing up from a chair using your arms (e.g. wheelchair or bedside chair): A little  To walk in hospital room: A little  Climbing 3-5 steps with railing: A lot  Basic Mobility - Total Score: 17                   Goals:  Encounter Problems       Encounter Problems (Active)       PT Problem       Pt will perform bed mobility with mod I.  (Progressing)       Start:  01/27/25    Expected End:  02/10/25            Pt will complete sit <> stand and bed <> chair transfers with mod I.  (Progressing)       Start:  01/27/25    Expected End:  02/10/25            Pt will ambulate 300 feet mod I using FWW with no significant gait deviations.   (Progressing)       Start:  01/27/25    Expected End:  02/10/25            Pt will verbalize and demonstrate understanding of DEBORAH supine/seated exercises.  (Progressing)       Start:  01/27/25    Expected End:  02/10/25            Pt will ascend/descend at least 3 stairs using rail and cane SBA in order to navigate home environment.   (Progressing)       Start:  01/27/25    Expected End:  02/10/25                     Education Documentation  Home Exercise Program, taught by Albertina Dominguez, PT at 1/27/2025  3:35 PM.  Learner: Patient  Readiness: Acceptance  Method: Explanation  Response: Verbalizes Understanding, Needs  Reinforcement    Precautions, taught by Albertina Dominguez, PT at 1/27/2025  3:35 PM.  Learner: Patient  Readiness: Acceptance  Method: Explanation  Response: Verbalizes Understanding, Needs Reinforcement    Body Mechanics, taught by Albertina Dominguez, PT at 1/27/2025  3:35 PM.  Learner: Patient  Readiness: Acceptance  Method: Explanation  Response: Verbalizes Understanding, Needs Reinforcement    Mobility Training, taught by Albertina Dominguez, PT at 1/27/2025  3:35 PM.  Learner: Patient  Readiness: Acceptance  Method: Explanation  Response: Verbalizes Understanding, Needs Reinforcement    Education Comments  No comments found.

## 2025-01-27 NOTE — ANESTHESIA PROCEDURE NOTES
Peripheral IV  Date/Time: 1/27/2025 8:05 AM  Inserted by: MAVIS Courtney    Placement  Needle size: 18 G  Laterality: right  Location: hand  Local anesthetic: none  Site prep: alcohol  Technique: anatomical landmarks  Attempts: 1

## 2025-01-28 VITALS
HEART RATE: 115 BPM | HEIGHT: 65 IN | SYSTOLIC BLOOD PRESSURE: 102 MMHG | RESPIRATION RATE: 17 BRPM | TEMPERATURE: 99 F | OXYGEN SATURATION: 94 % | WEIGHT: 180 LBS | BODY MASS INDEX: 29.99 KG/M2 | DIASTOLIC BLOOD PRESSURE: 69 MMHG

## 2025-01-28 PROBLEM — E87.1 HYPONATREMIA: Status: ACTIVE | Noted: 2025-01-28

## 2025-01-28 PROBLEM — D72.829 LEUKOCYTOSIS: Status: ACTIVE | Noted: 2025-01-28

## 2025-01-28 LAB
ANION GAP SERPL CALC-SCNC: 12 MMOL/L (ref 10–20)
BUN SERPL-MCNC: 20 MG/DL (ref 6–23)
CALCIUM SERPL-MCNC: 9.1 MG/DL (ref 8.6–10.3)
CHLORIDE SERPL-SCNC: 101 MMOL/L (ref 98–107)
CO2 SERPL-SCNC: 25 MMOL/L (ref 21–32)
CREAT SERPL-MCNC: 0.92 MG/DL (ref 0.5–1.3)
EGFRCR SERPLBLD CKD-EPI 2021: 90 ML/MIN/1.73M*2
ERYTHROCYTE [DISTWIDTH] IN BLOOD BY AUTOMATED COUNT: 11.8 % (ref 11.5–14.5)
GLUCOSE SERPL-MCNC: 162 MG/DL (ref 74–99)
HCT VFR BLD AUTO: 28.8 % (ref 41–52)
HGB BLD-MCNC: 9.6 G/DL (ref 13.5–17.5)
MCH RBC QN AUTO: 31.6 PG (ref 26–34)
MCHC RBC AUTO-ENTMCNC: 33.3 G/DL (ref 32–36)
MCV RBC AUTO: 95 FL (ref 80–100)
NRBC BLD-RTO: 0 /100 WBCS (ref 0–0)
PLATELET # BLD AUTO: 188 X10*3/UL (ref 150–450)
POTASSIUM SERPL-SCNC: 4.2 MMOL/L (ref 3.5–5.3)
RBC # BLD AUTO: 3.04 X10*6/UL (ref 4.5–5.9)
SODIUM SERPL-SCNC: 134 MMOL/L (ref 136–145)
WBC # BLD AUTO: 13.5 X10*3/UL (ref 4.4–11.3)

## 2025-01-28 PROCEDURE — 82374 ASSAY BLOOD CARBON DIOXIDE: CPT | Performed by: ORTHOPAEDIC SURGERY

## 2025-01-28 PROCEDURE — 2500000001 HC RX 250 WO HCPCS SELF ADMINISTERED DRUGS (ALT 637 FOR MEDICARE OP): Performed by: PHYSICIAN ASSISTANT

## 2025-01-28 PROCEDURE — 36415 COLL VENOUS BLD VENIPUNCTURE: CPT | Performed by: ORTHOPAEDIC SURGERY

## 2025-01-28 PROCEDURE — 97165 OT EVAL LOW COMPLEX 30 MIN: CPT | Mod: GO | Performed by: OCCUPATIONAL THERAPIST

## 2025-01-28 PROCEDURE — 2500000002 HC RX 250 W HCPCS SELF ADMINISTERED DRUGS (ALT 637 FOR MEDICARE OP, ALT 636 FOR OP/ED): Mod: MUE | Performed by: ORTHOPAEDIC SURGERY

## 2025-01-28 PROCEDURE — 2500000001 HC RX 250 WO HCPCS SELF ADMINISTERED DRUGS (ALT 637 FOR MEDICARE OP): Performed by: ORTHOPAEDIC SURGERY

## 2025-01-28 PROCEDURE — 85027 COMPLETE CBC AUTOMATED: CPT | Performed by: ORTHOPAEDIC SURGERY

## 2025-01-28 PROCEDURE — 97116 GAIT TRAINING THERAPY: CPT | Mod: GP,CQ

## 2025-01-28 PROCEDURE — 97110 THERAPEUTIC EXERCISES: CPT | Mod: GP,CQ

## 2025-01-28 PROCEDURE — 7100000011 HC EXTENDED STAY RECOVERY HOURLY - NURSING UNIT

## 2025-01-28 RX ORDER — CELECOXIB 200 MG/1
200 CAPSULE ORAL DAILY
Qty: 30 CAPSULE | Refills: 0 | Status: SHIPPED | OUTPATIENT
Start: 2025-01-29 | End: 2025-02-28

## 2025-01-28 RX ORDER — OXYCODONE HYDROCHLORIDE 5 MG/1
5 TABLET ORAL EVERY 6 HOURS PRN
Qty: 28 TABLET | Refills: 0 | Status: SHIPPED | OUTPATIENT
Start: 2025-01-28 | End: 2025-02-04

## 2025-01-28 RX ORDER — AMOXICILLIN 250 MG
1 CAPSULE ORAL DAILY
Qty: 30 TABLET | Refills: 0 | Status: SHIPPED | OUTPATIENT
Start: 2025-01-29 | End: 2025-02-28

## 2025-01-28 RX ORDER — ASPIRIN 81 MG/1
81 TABLET ORAL 2 TIMES DAILY
Qty: 60 TABLET | Refills: 0 | Status: SHIPPED | OUTPATIENT
Start: 2025-01-28 | End: 2025-02-27

## 2025-01-28 RX ORDER — PANTOPRAZOLE SODIUM 20 MG/1
20 TABLET, DELAYED RELEASE ORAL
Qty: 30 TABLET | Refills: 0 | Status: SHIPPED | OUTPATIENT
Start: 2025-01-29 | End: 2025-02-28

## 2025-01-28 RX ORDER — ACETAMINOPHEN 325 MG/1
650 TABLET ORAL EVERY 6 HOURS SCHEDULED
Qty: 240 TABLET | Refills: 0 | Status: SHIPPED | OUTPATIENT
Start: 2025-01-28 | End: 2025-02-27

## 2025-01-28 RX ADMIN — LISINOPRIL 20 MG: 20 TABLET ORAL at 09:48

## 2025-01-28 RX ADMIN — SENNOSIDES AND DOCUSATE SODIUM 1 TABLET: 50; 8.6 TABLET ORAL at 09:48

## 2025-01-28 RX ADMIN — ACETAMINOPHEN 975 MG: 325 TABLET ORAL at 06:06

## 2025-01-28 RX ADMIN — CALCIUM CARBONATE (ANTACID) CHEW TAB 500 MG 500 MG: 500 CHEW TAB at 09:48

## 2025-01-28 RX ADMIN — CELECOXIB 200 MG: 200 CAPSULE ORAL at 09:48

## 2025-01-28 RX ADMIN — PANTOPRAZOLE SODIUM 20 MG: 20 TABLET, DELAYED RELEASE ORAL at 06:06

## 2025-01-28 RX ADMIN — ASPIRIN 81 MG: 81 TABLET, COATED ORAL at 09:48

## 2025-01-28 ASSESSMENT — COGNITIVE AND FUNCTIONAL STATUS - GENERAL
DRESSING REGULAR UPPER BODY CLOTHING: A LITTLE
MOVING TO AND FROM BED TO CHAIR: A LITTLE
WALKING IN HOSPITAL ROOM: A LITTLE
DRESSING REGULAR LOWER BODY CLOTHING: A LOT
TURNING FROM BACK TO SIDE WHILE IN FLAT BAD: A LITTLE
CLIMB 3 TO 5 STEPS WITH RAILING: A LITTLE
PERSONAL GROOMING: A LITTLE
MOBILITY SCORE: 20
TOILETING: A LITTLE
DAILY ACTIVITIY SCORE: 18
HELP NEEDED FOR BATHING: A LITTLE

## 2025-01-28 ASSESSMENT — PAIN - FUNCTIONAL ASSESSMENT
PAIN_FUNCTIONAL_ASSESSMENT: 0-10

## 2025-01-28 ASSESSMENT — ACTIVITIES OF DAILY LIVING (ADL): ADL_ASSISTANCE: INDEPENDENT

## 2025-01-28 ASSESSMENT — PAIN SCALES - GENERAL
PAINLEVEL_OUTOF10: 6
PAINLEVEL_OUTOF10: 5 - MODERATE PAIN
PAINLEVEL_OUTOF10: 0 - NO PAIN
PAINLEVEL_OUTOF10: 0 - NO PAIN
PAINLEVEL_OUTOF10: 6

## 2025-01-28 NOTE — ASSESSMENT & PLAN NOTE
WBC 13.5, likely reactive and physiologic in the setting of recent total hip replacement as well as perioperative Decadron administration.  No evidence of acute postoperative infection on physical examination.

## 2025-01-28 NOTE — PROGRESS NOTES
Occupational Therapy    Evaluation    Patient Name: Kyle Argueta  MRN: [id number]  Today's Date: 1/28/2025  Time Calculation  Start Time: 0855  Stop Time: 0911  Time Calculation (min): 16 min  4127/4127-A  Eval only     Assessment  IP OT Assessment  Prognosis: Good  End of Session Communication: Bedside nurse  End of Session Patient Position: Up in chair, Alarm on  Patient presents with decline in ADLs, functional transfers, functional mobility and would benefit from OT during acute stay to improve functional independence and safety. Recommend low intensity OT to maximize functional independence and safety.     Plan:  Treatment Interventions: ADL retraining, Functional transfer training, Patient/family training, Equipment evaluation/education, Compensatory technique education  OT Frequency: Daily  OT Discharge Recommendations: Low intensity level of continued care  Equipment Recommended upon Discharge: Wheeled walker (shower chair, reacher, sock aide, LH shoe horn)  OT - OK to Discharge: Yes from acute care OT services to the next level of care when cleared by medical team      Subjective   Current Problem:  No diagnosis found.    General:  General  Reason for Referral: right posterior DEBORAH  Referred By: Amol Garcia MD  Past Medical History Relevant to Rehab: Admitted 1/27/2025 after having right posterior DEBORAH completed by Dr Garcia.  PMH: ETOH abuse, anemia, arthtritis, avascular necrosis bilateral hips, HLD, HTN  Prior to Session Communication: Bedside nurse  Patient Position Received: Up in chair, Alarm on  Preferred Learning Style: verbal  General Comment: Patient seated in bedside chair and agreeable to participate in OT evaluation.    Precautions:  LE Weight Bearing Status: Weight Bearing as Tolerated  Medical Precautions: Fall precautions  Post-Surgical Precautions: Right hip precautions    Pain:  Pain Assessment  Pain Assessment: 0-10  0-10 (Numeric) Pain Score: 5 - Moderate pain  Pain Type:  Surgical pain  Pain Location: Hip  Pain Orientation: Right  Pain Interventions: Rest    Objective   Cognition:  Overall Cognitive Status: Within Functional Limits    Home Living:  Type of Home: House  Lives With: Alone  Home Adaptive Equipment: Walker rolling or standard  Home Layout: Two level  Home Access: Stairs to enter with rails  Entrance Stairs-Number of Steps: 3  Bathroom Shower/Tub: Walk-in shower  Bathroom Equipment: Shower chair with back  Home Living Comments: friend will assist at discharge     Prior Function:  Level of Early: Independent with ADLs and functional transfers  Receives Help From: Friends  ADL Assistance: Independent  Homemaking Assistance: Independent  Ambulatory Assistance:  (mod I with WW short distances and wheelchair for long distances)      ADL:  LE Dressing Assistance: Moderate (don underwear. Educated patient on use of reacher. Mod verbal cues provided to don underwear on surgical LE first. Patient reports owning reacher and sock aide at home)     Activity Tolerance:  Endurance: Endurance does not limit participation in activity    Bed Mobility/Transfers:      Transfers  Transfer:  (SBA sit <> stand with min verbal cues for proper hand placement and technique)  Educated patient on safety with car transfers and patient verbalized understanding.      Extremities: RUE   RUE : Within Functional Limits and LUE   LUE: Within Functional Limits    Outcome Measures: [facility] Daily Activity  Putting on and taking off regular lower body clothing: A lot  Bathing (including washing, rinsing, drying): A little  Putting on and taking off regular upper body clothing: A little  Toileting, which includes using toilet, bedpan or urinal: A little  Taking care of personal grooming such as brushing teeth: A little  Eating Meals: None  Daily Activity - Total Score: 18    EDUCATION:  Education  Individual(s) Educated: Patient  Education Provided: Fall precautons (safety, hip precautions, safety and  comp strategies with LB dressing)  Patient Response to Education: Patient/Caregiver Verbalized Understanding of Information    Goals:   Encounter Problems       Encounter Problems (Active)       Dressings Lower Extremities       STG - Patient will complete lower body dressing independently with AE and comp strategies  (Progressing)       Start:  01/28/25    Expected End:  02/11/25               Functional Balance       STG-Patient will be independent with assistive device dynamic stand task >5 minutes for ADL completion   (Progressing)       Start:  01/28/25    Expected End:  02/11/25               Functional Mobility       STG-Patient will be independent with assistive device functional mobility tasks   (Progressing)       Start:  01/28/25    Expected End:  02/11/25               OT Transfers       STG - Patient will perform car transfers independently demonstrating good safety (Progressing)       Start:  01/28/25    Expected End:  02/11/25            STG-Patient will be independent with functional transfers demonstrating good safety   (Progressing)       Start:  01/28/25    Expected End:  02/11/25               Safety       STG-Patient will independently verbalize hip precautions with all functional tasks   (Progressing)       Start:  01/28/25    Expected End:  02/11/25

## 2025-01-28 NOTE — PROGRESS NOTES
01/28/25 1148   Discharge Planning   Living Arrangements Alone   Support Systems Family members   Type of Residence Private residence   Home or Post Acute Services In home services   Type of Home Care Services Home PT   Expected Discharge Disposition Home     Met with patient at bedside. Admitted for R DEBORAH. Pt lives alone and was independent PTA with no HHC. Pt has a walker. PCP is Caden Morales. Pt feels he is able to manage his health and understands his medications. Pt has been unable to drive since surgery last August. Friends and family provide transport. PT [facility] 20. Pt plans to return home with [facility]. Pt has number to call [facility] when he arrives home. Family will provide transport.

## 2025-01-28 NOTE — PROGRESS NOTES
Spiritual Care Visit  Spiritual Care Request    Reason for Visit:  Routine Visit: Introduction  Continue Visiting: No     Request Received From:       Focus of Care:  Visited With: Patient         Refer to :          Spiritual Care Assessment    Spiritual Assessment:                      Care Provided:  Intended Effects: Establish rapport and connectedness, Lianna affirmation, Build relationship of care and support, Demonstrate caring and concern    Sense of Community and or Mandaeism Affiliation:  Anglican         Addressed Needs/Concerns and/or Tanya Through:  Mandaeism Encounters  Mandaeism Needs: Prayer  Sacramental Encounters  Communion: Does not want communion  Communion Given Indicator: No    Outcome:  Outcome of Spiritual Care Visit: Taylor     Advance Directives:         Spiritual Care Annotation    Annotation:

## 2025-01-28 NOTE — DISCHARGE SUMMARY
Discharge Diagnosis  Other osteonecrosis, right femur (Multi)    Test Results Pending At Discharge  Pending Labs       No current pending labs.            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  Underwent right total hip replacement on 1/27 without complication.  Admitted under extended recovery status for postoperative pain control, physical therapy, and medical management.  Postoperative course was remarkable for:  -Acute postoperative anemia due to surgical blood loss: Hemoglobin 9.6, patient remained hemodynamically stable.  Received oral tranexamic acid preoperatively.  No further follow-up, evaluation, management necessary.  -Acute postoperative pain: Managed with oral and intravenous pain medication.  Plan for scheduled acetaminophen and celecoxib, intermittent oxycodone at discharge.  -Leukocytosis: WBC 13.5 on postoperative day 1, likely reactive and physiologic in the setting of recent total hip replacement as well as perioperative Decadron administration.  No evidence of acute postoperative infection on physical examination.  No further follow-up, evaluation, or management necessary.  -Hyponatremia: Sodium 134, patient was asymptomatic.  IV fluids discontinued.  No further follow-up, evaluation, or management necessary.  -Prophylaxis against venous thromboembolism: Sequential compression devices utilized, early ambulation encouraged.  Initiated aspirin 81 mg by mouth twice daily on the evening of surgical date, and this to be continued for 30 days following discharge.  -Prophylaxis against surgical site infection: Received intravenous Ancef preoperatively and postoperatively to complete 23-hour total course.    Maintained on posterior hip precautions, no pivoting, Mepilex dressing in place.  Evaluated by Physical and Occupational Therapy,  Progressed appropriately, and discharged home in stable  condition on 1/28.  Plan for follow-up with Dr. Garcia in 6-9 days for postoperative assessment and incision check.    Pertinent Physical Exam At Time of Discharge  Alert, oriented x 3, cooperative with examination.     Examination of the right lower extremity reveals that his hip incision is clean and dry with Mepilex dressing in place, no drainage.  Right thigh minimally swollen and supple.  Right EHL, plantarflexion, dorsiflexion, ankle inversion and eversion are 4+/5.  Able to isometrically fire right quadricep.  Sensory intact light touch in the deep/superficial/common peroneal, saphenous, tibial, sural nerve distributions as well as along the cutaneous distributions of the thigh.  DP pulse 2+ with capillary refill less than 2 seconds.  Negative Homans' sign.    Home Medications     Medication List      START taking these medications     acetaminophen 325 mg tablet; Commonly known as: Tylenol; Take 2 tablets   (650 mg) by mouth every 6 hours. Continue on a regular schedule for 30   days.   aspirin 81 mg EC tablet; Take 1 tablet (81 mg) by mouth 2 times a day.   Continue on a regular schedule for 30 days.   celecoxib 200 mg capsule; Commonly known as: CeleBREX; Take 1 capsule   (200 mg) by mouth once daily. Continue on a regular schedule for 30 days.;   Start taking on: January 29, 2025   oxyCODONE 5 mg immediate release tablet; Commonly known as: Roxicodone;   Take 1 tablet (5 mg) by mouth every 6 hours if needed (Pain) for up to 7   days.   sennosides-docusate sodium 8.6-50 mg tablet; Commonly known as:   Heaven-Colace; Take 1 tablet by mouth once daily. Continue on a regular   schedule for 30 days.; Start taking on: January 29, 2025     CONTINUE taking these medications     lisinopril 20 mg tablet   pantoprazole 20 mg EC tablet; Commonly known as: ProtoNix; Take 1 tablet   (20 mg) by mouth once daily in the morning. Take before meals. Do not   crush, chew, or split.  Continue on a regular schedule for 30  days.; Start   taking on: January 29, 2025       Outpatient Follow-Up  No future appointments.    Amol Garcia MD

## 2025-01-28 NOTE — ASSESSMENT & PLAN NOTE
Continue scheduled acetaminophen, intermittent oxycodone, IV morphine for breakthrough pain.  Celebrex as an anti-inflammatory adjunct.

## 2025-01-28 NOTE — CARE PLAN
The patient's goals for the shift include      The clinical goals for the shift include pain control  Pt up with therapy. Denies pain at rest. Refused pain medication prior to therapy. Mepilex cdi to rt hip. Toleating diet. Voiding per urinal.

## 2025-01-28 NOTE — PROGRESS NOTES
"Kyle Argueta is a 69 y.o. male on day 0 of admission presenting with Other osteonecrosis, right femur (Multi).    Subjective   Sitting up in chair after working with therapy this morning.  Reporting no issues at this time.  Pain is been well-controlled with current pain regime.  No complaints of chest pain, shortness of breath, lightheaded or dizziness.  Tolerating a diet with no nausea vomiting.  Voiding well.  Plans on discharge to home with home health care.       Objective     Physical Exam  Constitutional:       Appearance: Normal appearance.   HENT:      Head: Normocephalic and atraumatic.      Nose: Nose normal.      Mouth/Throat:      Mouth: Mucous membranes are moist.   Cardiovascular:      Rate and Rhythm: Normal rate.   Pulmonary:      Effort: Pulmonary effort is normal.   Abdominal:      General: Abdomen is flat.      Palpations: Abdomen is soft.   Musculoskeletal:      Cervical back: Neck supple.      Comments: Right lateral hip area with surgical dressing clean dry and intact, sensation present throughout right lower extremity, plantar and dorsiflexion of right foot against resistance, EHL, DP pulse palpable right foot   Skin:     General: Skin is warm and dry.   Neurological:      General: No focal deficit present.      Mental Status: He is alert.   Psychiatric:         Mood and Affect: Mood normal.         Last Recorded Vitals  Blood pressure 125/58, pulse 98, temperature 37.3 °C (99.1 °F), resp. rate 16, height 1.651 m (5' 5\"), weight 81.6 kg (180 lb), SpO2 99%.  Intake/Output last 3 Shifts:  I/O last 3 completed shifts:  In: 2210 (27.1 mL/kg) [P.O.:960; I.V.:1150 (14.1 mL/kg); IV Piggyback:100]  Out: 900 (11 mL/kg) [Urine:900 (0.3 mL/kg/hr)]  Weight: 81.6 kg     Relevant Results  Results for orders placed or performed during the hospital encounter of 01/27/25 (from the past 24 hours)   CBC   Result Value Ref Range    WBC 13.5 (H) 4.4 - 11.3 x10*3/uL    nRBC 0.0 0.0 - 0.0 /100 WBCs    RBC 3.04 " (L) 4.50 - 5.90 x10*6/uL    Hemoglobin 9.6 (L) 13.5 - 17.5 g/dL    Hematocrit 28.8 (L) 41.0 - 52.0 %    MCV 95 80 - 100 fL    MCH 31.6 26.0 - 34.0 pg    MCHC 33.3 32.0 - 36.0 g/dL    RDW 11.8 11.5 - 14.5 %    Platelets 188 150 - 450 x10*3/uL   Basic metabolic panel   Result Value Ref Range    Glucose 162 (H) 74 - 99 mg/dL    Sodium 134 (L) 136 - 145 mmol/L    Potassium 4.2 3.5 - 5.3 mmol/L    Chloride 101 98 - 107 mmol/L    Bicarbonate 25 21 - 32 mmol/L    Anion Gap 12 10 - 20 mmol/L    Urea Nitrogen 20 6 - 23 mg/dL    Creatinine 0.92 0.50 - 1.30 mg/dL    eGFR 90 >60 mL/min/1.73m*2    Calcium 9.1 8.6 - 10.3 mg/dL     Scheduled medications  acetaminophen, 975 mg, oral, q8h ISAIAH  aspirin, 81 mg, oral, BID  calcium carbonate, 500 mg, oral, Daily  celecoxib, 200 mg, oral, Daily  lisinopril, 20 mg, oral, Daily  pantoprazole, 20 mg, oral, Daily before breakfast  sennosides-docusate sodium, 1 tablet, oral, Daily      Continuous medications  lactated Ringer's, 125 mL/hr, Last Rate: 125 mL/hr (01/27/25 1200)  oxygen, 2 L/min      PRN medications  PRN medications: benzocaine-menthol, magnesium hydroxide, morphine, naloxone, ondansetron **OR** ondansetron, oxyCODONE, oxyCODONE, oxyCODONE    Assessment/Plan   Assessment & Plan  Other osteonecrosis, right femur (Multi)    Hypertension    Acute postoperative pain    Status post right hip replacement    Postop day 1 of right total hip arthroplasty for avascular necrosis  Physical and Occupational Therapy are on consult  Weightbearing as tolerated with walker  ASA for VTE prophylaxis  Labs reviewed  Multimodal pain regime  Home medications ordered for chronic medical conditions as appropriate  Bowel regime  Encourage incentive spirometry  Plans on discharge to home with home health care  Follow-up with Dr. Garcia as directed       I spent 10 minutes in the professional and overall care of this patient.      Ally Zayas PA-C

## 2025-01-28 NOTE — NURSING NOTE
Pt discharged to home. Denies pain. Refused tylenol. Has follow up visit with dr. Garcia. H/l removed.

## 2025-01-28 NOTE — PROGRESS NOTES
"Kyle Argueta is a 69 y.o. male on day 0 of admission presenting with Other osteonecrosis, right femur (Multi).    Subjective   Surgical findings were reviewed.  Patient notes pain is well-controlled on current protocol.  PT/OT assessment appreciated.  No chest pain, nausea, or dyspnea.  Plan for discharge home was discussed with, and agreeable to, the patient.       Objective     Alert, oriented x 3, cooperative with examination.    Examination of the right lower extremity reveals that his hip incision is clean and dry with Mepilex dressing in place, no drainage.  Right thigh minimally swollen and supple.  Right EHL, plantarflexion, dorsiflexion, ankle inversion and eversion are 4+/5.  Able to isometrically fire right quadricep.  Sensory intact light touch in the deep/superficial/common peroneal, saphenous, tibial, sural nerve distributions as well as along the cutaneous distributions of the thigh.  DP pulse 2+ with capillary refill less than 2 seconds.  Negative Homans' sign.    Last Recorded Vitals  Blood pressure 125/58, pulse 98, temperature 37.3 °C (99.1 °F), resp. rate 16, height 1.651 m (5' 5\"), weight 81.6 kg (180 lb), SpO2 99%.  Intake/Output last 3 Shifts:  I/O last 3 completed shifts:  In: 2210 (27.1 mL/kg) [P.O.:960; I.V.:1150 (14.1 mL/kg); IV Piggyback:100]  Out: 900 (11 mL/kg) [Urine:900 (0.3 mL/kg/hr)]  Weight: 81.6 kg     Relevant Results    Postoperative AP pelvis was reviewed and demonstrates uncemented acetabular and femoral components in good position with supplemental screw fixation of the acetabulum.  Limb lengths and offset have been restored.  No fracture or dislocation.  Appropriate inclination/abduction of the acetabular component.    Scheduled medications  acetaminophen, 975 mg, oral, q8h ISAIAH  aspirin, 81 mg, oral, BID  calcium carbonate, 500 mg, oral, Daily  celecoxib, 200 mg, oral, Daily  lisinopril, 20 mg, oral, Daily  pantoprazole, 20 mg, oral, Daily before " breakfast  sennosides-docusate sodium, 1 tablet, oral, Daily      Continuous medications  oxygen, 2 L/min      PRN medications  PRN medications: benzocaine-menthol, magnesium hydroxide, morphine, naloxone, ondansetron **OR** ondansetron, oxyCODONE, oxyCODONE, oxyCODONE  Results for orders placed or performed during the hospital encounter of 01/27/25 (from the past 24 hours)   CBC   Result Value Ref Range    WBC 13.5 (H) 4.4 - 11.3 x10*3/uL    nRBC 0.0 0.0 - 0.0 /100 WBCs    RBC 3.04 (L) 4.50 - 5.90 x10*6/uL    Hemoglobin 9.6 (L) 13.5 - 17.5 g/dL    Hematocrit 28.8 (L) 41.0 - 52.0 %    MCV 95 80 - 100 fL    MCH 31.6 26.0 - 34.0 pg    MCHC 33.3 32.0 - 36.0 g/dL    RDW 11.8 11.5 - 14.5 %    Platelets 188 150 - 450 x10*3/uL   Basic metabolic panel   Result Value Ref Range    Glucose 162 (H) 74 - 99 mg/dL    Sodium 134 (L) 136 - 145 mmol/L    Potassium 4.2 3.5 - 5.3 mmol/L    Chloride 101 98 - 107 mmol/L    Bicarbonate 25 21 - 32 mmol/L    Anion Gap 12 10 - 20 mmol/L    Urea Nitrogen 20 6 - 23 mg/dL    Creatinine 0.92 0.50 - 1.30 mg/dL    eGFR 90 >60 mL/min/1.73m*2    Calcium 9.1 8.6 - 10.3 mg/dL                            Assessment/Plan   Assessment & Plan  Acute postoperative pain  Continue scheduled acetaminophen, intermittent oxycodone, IV morphine for breakthrough pain.  Celebrex as an anti-inflammatory adjunct.  Status post right hip replacement  Postoperative day 1 from right total hip replacement.  Full weightbearing as tolerated, with walker for assistance.  Posterior hip precautions, no pivoting.  Mepilex dressing to remain in place.  Continue to mobilize with PT/OT.  Acute postoperative anemia due to expected blood loss  Hemoglobin 9.6, patient hemodynamically stable.  Received oral tranexamic acid preoperatively.  Leukocytosis  WBC 13.5, likely reactive and physiologic in the setting of recent total hip replacement as well as perioperative Decadron administration.  No evidence of acute postoperative  infection on physical examination.  Hyponatremia  Sodium 134, patient asymptomatic.  IV fluids discontinued.    GI prophylaxis: PPI, stool softener.    Prophylaxis against venous thromboembolism: Sequential compression devices, early ambulation.  Chemoprophylaxis with aspirin 81 mg by mouth twice daily.    Disposition: Plan for discharge home with home physical therapy.  Follow-up in my office in 6-9 days for postoperative assessment and incision check.    I spent 22 minutes in the professional and overall care of this patient.      Amol Garcia MD

## 2025-01-28 NOTE — PROGRESS NOTES
Physical Therapy    Physical Therapy    Physical Therapy Treatment    Patient Name: Kyle Argueta  MRN: [id number]  Today's Date: 1/28/2025  Time Calculation  Start Time: 1128  Stop Time: 1156  Time Calculation (min): 28 min     4127/4127-A    Assessment/Plan   PT Assessment  PT Assessment Results: Decreased strength, Decreased range of motion, Decreased endurance, Impaired balance, Decreased mobility, Pain, Orthopedic restrictions  End of Session Communication: Bedside nurse  End of Session Patient Position: Up in chair, Alarm on  PT Plan  Inpatient/Swing Bed or Outpatient: Inpatient  PT Plan  Treatment/Interventions: Bed mobility, Transfer training, Gait training, Balance training, Neuromuscular re-education, Strengthening, Endurance training, Range of motion, Therapeutic exercise, Therapeutic activity, Home exercise program, Stair training  PT Plan: Ongoing PT  PT Frequency: BID  PT Discharge Recommendations: Low intensity level of continued care  Equipment Recommended upon Discharge: Wheeled walker  PT Recommended Transfer Status: Assist x1  PT - OK to Discharge: Yes ()     01/28/25 1128   PT  Visit   PT Received On 01/28/25   Response to Previous Treatment Patient with no complaints from previous session.   General   Reason for Referral recent surgery   Referred By Amol Garcia MD   Past Medical History Relevant to Rehab Pt admitted 1/27/25 following completion of right posterior DEBORAH. PMH: HTN, avascular necrosis of bilateral hips, anemia, alcohol abuse   Patient Position Received Up in chair   Preferred Learning Style verbal   Precautions   LE Weight Bearing Status Weight Bearing as Tolerated   Medical Precautions Fall precautions   Post-Surgical Precautions Left hip precautions   Pain Assessment   Pain Assessment 0-10   0-10 (Numeric) Pain Score 6   Pain Type Surgical pain   Pain Location Hip   Pain Orientation Right   Therapeutic Exercise   Therapeutic Exercise Performed Yes   Therapeutic Exercise  Activity 1 AP,QS,GS   Bed Mobility   Bed Mobility Yes   Bed Mobility 1   Bed Mobility 1 Sitting to supine   Level of Assistance 1 Modified independent   Ambulation/Gait Training   Ambulation/Gait Training Performed Yes   Ambulation/Gait Training 1   Surface 1 Level tile   Device 1 Rolling walker   Gait Support Devices Gait belt   Assistance 1 Contact guard;Close supervision   Quality of Gait 1 Inconsistent stride length;Forward flexed posture;Antalgic   Comments/Distance (ft) 1 80 x 2   Transfers   Transfer Yes   Transfer 1   Technique 1 Sit to stand;Stand to sit   Transfer Device 1 Gait belt;Walker   Transfer Level of Assistance 1 Contact guard  (verbal cues to maintain precautions)   Stairs   Stairs Yes   Stairs   Rails 1 Right   Device 1 Single point cane   Support Devices 1 Gait belt   Assistance 1 Contact guard   Comment/Number of Steps 1 3  (Good stability and safety awareness on stairs)   Activity Tolerance   Endurance Tolerates 10 - 20 min exercise with multiple rests   PT Assessment   PT Assessment Results Decreased strength;Decreased range of motion;Decreased endurance;Impaired balance;Decreased mobility;Pain;Orthopedic restrictions   End of Session Communication Bedside nurse   End of Session Patient Position Up in chair;Alarm on   PT Plan   Inpatient/Swing Bed or Outpatient Inpatient     Outcome Measures:  [facility] Basic Mobility  Turning from your back to your side while in a flat bed without using bedrails: None  Moving from lying on your back to sitting on the side of a flat bed without using bedrails: A little  Moving to and from bed to chair (including a wheelchair): A little  Standing up from a chair using your arms (e.g. wheelchair or bedside chair): None  To walk in hospital room: A little  Climbing 3-5 steps with railing: A little  Basic Mobility - Total Score: 20                             EDUCATION:     Education Documentation  No documentation found.  Education Comments  No comments  found.        GOALS:  Encounter Problems       Encounter Problems (Active)       PT Problem       Pt will perform bed mobility with mod I.  (Progressing)       Start:  01/27/25    Expected End:  02/10/25            Pt will complete sit <> stand and bed <> chair transfers with mod I.  (Progressing)       Start:  01/27/25    Expected End:  02/10/25            Pt will ambulate 300 feet mod I using FWW with no significant gait deviations.   (Progressing)       Start:  01/27/25    Expected End:  02/10/25            Pt will verbalize and demonstrate understanding of DEBORAH supine/seated exercises.  (Progressing)       Start:  01/27/25    Expected End:  02/10/25            Pt will ascend/descend at least 3 stairs using rail and cane SBA in order to navigate home environment.   (Progressing)       Start:  01/27/25    Expected End:  02/10/25

## 2025-01-28 NOTE — PROGRESS NOTES
Physical Therapy    Physical Therapy    Physical Therapy Treatment    Patient Name: Kyle Argueta  MRN: [id number]  Today's Date: 1/28/2025  Time Calculation  Start Time: 0722  Stop Time: 0752  Time Calculation (min): 30 min     4127/4127-A    Assessment/Plan   PT Assessment  PT Assessment Results: Decreased strength, Decreased range of motion, Decreased endurance, Impaired balance, Decreased mobility, Pain, Orthopedic restrictions  End of Session Communication: Bedside nurse  End of Session Patient Position: Up in chair, Alarm on  PT Plan  Inpatient/Swing Bed or Outpatient: Inpatient  PT Plan  Treatment/Interventions: Bed mobility, Transfer training, Gait training, Balance training, Neuromuscular re-education, Strengthening, Endurance training, Range of motion, Therapeutic exercise, Therapeutic activity, Home exercise program, Stair training  PT Plan: Ongoing PT  PT Frequency: BID  PT Discharge Recommendations: Low intensity level of continued care  Equipment Recommended upon Discharge: Wheeled walker  PT Recommended Transfer Status: Assist x1  PT - OK to Discharge: Yes ()     01/28/25 0722   PT  Visit   PT Received On 01/28/25   Response to Previous Treatment Patient with no complaints from previous session.   General   Reason for Referral recent surgery   Referred By Amol Garcia MD   Past Medical History Relevant to Rehab Pt admitted 1/27/25 following completion of right posterior DEBORAH. PMH: HTN, avascular necrosis of bilateral hips, anemia, alcohol abuse   Patient Position Received Bed, 3 rail up;Alarm on   Preferred Learning Style verbal   Precautions   LE Weight Bearing Status Weight Bearing as Tolerated   Medical Precautions Fall precautions   Post-Surgical Precautions Left hip precautions   Vital Signs   SpO2 99 %  (On RA)   Pain Assessment   Pain Assessment 0-10   0-10 (Numeric) Pain Score 6   Pain Type Surgical pain   Pain Location Hip   Pain Orientation Right   Cognition   Overall Cognitive Status  WFL   Therapeutic Exercise   Therapeutic Exercise Performed Yes   Therapeutic Exercise Activity 1 AP,  QS,  GS,  Hip Abduction,  Marchin,  LAQ,  Ball Squeezes  x 20 B with emphasis on R LE   Bed Mobility   Bed Mobility Yes   Bed Mobility 1   Bed Mobility 1 Supine to sitting   Level of Assistance 1 Modified independent   Ambulation/Gait Training   Ambulation/Gait Training Performed Yes   Ambulation/Gait Training 1   Surface 1 Level tile   Device 1 Rolling walker   Gait Support Devices Gait belt   Assistance 1 Contact guard;Close supervision   Quality of Gait 1 Inconsistent stride length;Forward flexed posture;Antalgic   Comments/Distance (ft) 1 80 x 2  (Patient needing cues for sequencing, Mainly step to gait pattern)   Transfers   Transfer Yes   Transfer 1   Technique 1 Sit to stand;Stand to sit   Activity Tolerance   Endurance Tolerates 10 - 20 min exercise with multiple rests   PT Assessment   PT Assessment Results Decreased strength;Decreased range of motion;Decreased endurance;Impaired balance;Decreased mobility;Pain;Orthopedic restrictions   End of Session Communication Bedside nurse   End of Session Patient Position Up in chair;Alarm on   PT Plan   Inpatient/Swing Bed or Outpatient Inpatient     Outcome Measures:  [facility] Basic Mobility  Turning from your back to your side while in a flat bed without using bedrails: None  Moving from lying on your back to sitting on the side of a flat bed without using bedrails: A little  Moving to and from bed to chair (including a wheelchair): A little  Standing up from a chair using your arms (e.g. wheelchair or bedside chair): None  To walk in hospital room: A little  Climbing 3-5 steps with railing: A little  Basic Mobility - Total Score: 20                             EDUCATION:     Education Documentation  No documentation found.  Education Comments  No comments found.        GOALS:  Encounter Problems       Encounter Problems (Active)       PT Problem       Pt will  perform bed mobility with mod I.  (Progressing)       Start:  01/27/25    Expected End:  02/10/25            Pt will complete sit <> stand and bed <> chair transfers with mod I.  (Progressing)       Start:  01/27/25    Expected End:  02/10/25            Pt will ambulate 300 feet mod I using FWW with no significant gait deviations.   (Progressing)       Start:  01/27/25    Expected End:  02/10/25            Pt will verbalize and demonstrate understanding of DEBORAH supine/seated exercises.  (Progressing)       Start:  01/27/25    Expected End:  02/10/25            Pt will ascend/descend at least 3 stairs using rail and cane SBA in order to navigate home environment.   (Progressing)       Start:  01/27/25    Expected End:  02/10/25

## 2025-01-31 LAB
BLOOD EXPIRATION DATE: NORMAL
BLOOD EXPIRATION DATE: NORMAL
DISPENSE STATUS: NORMAL
DISPENSE STATUS: NORMAL
PRODUCT BLOOD TYPE: 9500
PRODUCT BLOOD TYPE: 9500
PRODUCT CODE: NORMAL
PRODUCT CODE: NORMAL
UNIT ABO: NORMAL
UNIT ABO: NORMAL
UNIT NUMBER: NORMAL
UNIT NUMBER: NORMAL
UNIT RH: NORMAL
UNIT RH: NORMAL
UNIT VOLUME: 350
UNIT VOLUME: 500
XM INTEP: NORMAL
XM INTEP: NORMAL

## (undated) DEVICE — PADDING, WEBRIL, UNDERCAST, STERILE, 3 IN

## (undated) DEVICE — Device

## (undated) DEVICE — SUTURE, STRATAFIX, 3-0, SPIRAL MONOCRYL PLUS, PS, 70CM, UNDYED

## (undated) DEVICE — SOLUTION, IRRIGATION, SODIUM CHLORIDE 0.9%, 1000 ML, POUR BOTTLE

## (undated) DEVICE — SUTURE, VICRYL, 2-0, 36 IN, CT-1, UNDYED

## (undated) DEVICE — CLOSURE SYSTEM, DERMABOND, PRINEO, 22CM, STERILE

## (undated) DEVICE — GLOVE, SURGICAL, PROTEXIS PI BLUE W/NEUTHERA, 8.0, PF, LF

## (undated) DEVICE — DRAPE, INSTRUMENT, W/POUCH, STERI DRAPE, 7 X 11 IN, DISPOSABLE, STERILE

## (undated) DEVICE — GOWN, ISOLATION, IMPERVIOUS, XLARGE, LF, BLUE

## (undated) DEVICE — DRILL BIT, RNGLC +ACET, 3.2MM X 30MM

## (undated) DEVICE — WOUND SYSTEM, DEBRIDEMENT & CLEANING, O.R DUOPAK

## (undated) DEVICE — HOOD, STERISHIELD T4 SYSTEM

## (undated) DEVICE — SUTURE, VICRYL, 1, 36 IN, CT-1, UNDYED

## (undated) DEVICE — SEALER, BIPOLAR, AQUA MANTYS 6.0

## (undated) DEVICE — SOLUTION, IRRIGATION, STERILE WATER, 1000 ML, POUR BOTTLE

## (undated) DEVICE — NEEDLE, MAYO, 1/2 CIRCLE, GATGUT # 3, LF

## (undated) DEVICE — DRESSING, MEPILEX BORDER, POST-OP AG, 4 X 12 IN

## (undated) DEVICE — GLOVE, COTTON, STERILE

## (undated) DEVICE — SUTURE, STARTAFIX, SYMMETRIC, PDS PLUS, 60CM CT-1

## (undated) DEVICE — TOWEL PACK, STERILE, 4/PACK, BLUE

## (undated) DEVICE — NEEDLE, 0.5 TAPER MAYO CATGUT, 0.050IN/1.417IN

## (undated) DEVICE — BLADE, RECIPROCATING, OFFSET, HEAVY DUTY, FIXED POINT .14

## (undated) DEVICE — DRAPE, SHEET, U, STERI DRAPE, 47 X 51 IN, DISPOSABLE, STERILE

## (undated) DEVICE — APPLICATOR, CHLORAPREP, W/ORANGE TINT, 26ML

## (undated) DEVICE — ADHESIVE, DERMABOND, PRINEO, 12 X 9, STERILE

## (undated) DEVICE — IRRIGATION SYSTEM, WOUND, SURGIPHOR, 450ML, STERILE

## (undated) DEVICE — HIGH FLOW TIP FOR INTERPULSE HANDPIECE SET

## (undated) DEVICE — INTERPULSE HANDPIECE SET W/ 10FT SUCTION TUBING

## (undated) DEVICE — SUTURE, ETHIBOND XTRA, 5 V-37, GRN/BR, LF

## (undated) DEVICE — DRESSING, MEPILEX BORDER, POST-OP AG, 4 X 10 IN